# Patient Record
Sex: MALE | Race: WHITE | NOT HISPANIC OR LATINO | Employment: OTHER | URBAN - METROPOLITAN AREA
[De-identification: names, ages, dates, MRNs, and addresses within clinical notes are randomized per-mention and may not be internally consistent; named-entity substitution may affect disease eponyms.]

---

## 2017-05-30 ENCOUNTER — LAB REQUISITION (OUTPATIENT)
Dept: LAB | Facility: HOSPITAL | Age: 57
End: 2017-05-30

## 2017-05-30 DIAGNOSIS — J32.9 CHRONIC SINUSITIS: ICD-10-CM

## 2017-05-30 PROCEDURE — 88305 TISSUE EXAM BY PATHOLOGIST: CPT | Performed by: OTOLARYNGOLOGY

## 2017-05-30 PROCEDURE — 88311 DECALCIFY TISSUE: CPT | Performed by: OTOLARYNGOLOGY

## 2017-06-01 LAB
CYTO UR: NORMAL
LAB AP CASE REPORT: NORMAL
LAB AP CLINICAL INFORMATION: NORMAL
Lab: NORMAL
PATH REPORT.FINAL DX SPEC: NORMAL
PATH REPORT.GROSS SPEC: NORMAL

## 2023-09-10 ENCOUNTER — HOSPITAL ENCOUNTER (INPATIENT)
Facility: HOSPITAL | Age: 63
LOS: 10 days | Discharge: HOME | DRG: 871 | End: 2023-09-20
Attending: EMERGENCY MEDICINE | Admitting: INTERNAL MEDICINE
Payer: MEDICAID

## 2023-09-10 DIAGNOSIS — J44.1 COPD EXACERBATION: ICD-10-CM

## 2023-09-10 DIAGNOSIS — N17.9 AKI (ACUTE KIDNEY INJURY): Primary | ICD-10-CM

## 2023-09-10 DIAGNOSIS — J15.9 COMMUNITY ACQUIRED BACTERIAL PNEUMONIA: ICD-10-CM

## 2023-09-10 DIAGNOSIS — J18.1 LOBAR PNEUMONIA: ICD-10-CM

## 2023-09-10 DIAGNOSIS — R09.02 HYPOXIA: ICD-10-CM

## 2023-09-10 DIAGNOSIS — A41.9 SEVERE SEPSIS: ICD-10-CM

## 2023-09-10 DIAGNOSIS — E87.70 VOLUME OVERLOAD: ICD-10-CM

## 2023-09-10 DIAGNOSIS — R07.9 CHEST PAIN: ICD-10-CM

## 2023-09-10 DIAGNOSIS — R65.20 SEVERE SEPSIS: ICD-10-CM

## 2023-09-10 DIAGNOSIS — A41.9 SEPSIS, DUE TO UNSPECIFIED ORGANISM, UNSPECIFIED WHETHER ACUTE ORGAN DYSFUNCTION PRESENT: ICD-10-CM

## 2023-09-10 PROBLEM — I10 HTN (HYPERTENSION): Chronic | Status: ACTIVE | Noted: 2023-09-10

## 2023-09-10 PROBLEM — E87.6 HYPOKALEMIA: Status: ACTIVE | Noted: 2023-09-10

## 2023-09-10 PROBLEM — E66.01 SEVERE OBESITY (BMI >= 40): Status: ACTIVE | Noted: 2023-09-10

## 2023-09-10 PROBLEM — J96.02 ACUTE RESPIRATORY FAILURE WITH HYPOXIA AND HYPERCARBIA: Status: ACTIVE | Noted: 2023-09-10

## 2023-09-10 PROBLEM — E87.29 HIGH ANION GAP METABOLIC ACIDOSIS: Status: ACTIVE | Noted: 2023-09-10

## 2023-09-10 PROBLEM — J18.9 PNEUMONIA: Status: ACTIVE | Noted: 2023-09-10

## 2023-09-10 PROBLEM — J96.01 ACUTE RESPIRATORY FAILURE WITH HYPOXIA AND HYPERCARBIA: Status: ACTIVE | Noted: 2023-09-10

## 2023-09-10 PROBLEM — I10 HTN (HYPERTENSION): Status: ACTIVE | Noted: 2023-09-10

## 2023-09-10 PROBLEM — R74.01 TRANSAMINITIS: Status: ACTIVE | Noted: 2023-09-10

## 2023-09-10 PROBLEM — E87.1 HYPONATREMIA: Status: ACTIVE | Noted: 2023-09-10

## 2023-09-10 LAB
ALBUMIN SERPL BCP-MCNC: 2 G/DL (ref 3.5–5.2)
ALBUMIN SERPL BCP-MCNC: 2 G/DL (ref 3.5–5.2)
ALLENS TEST: ABNORMAL
ALLENS TEST: ABNORMAL
ALLENS TEST: NORMAL
ALP SERPL-CCNC: 118 U/L (ref 55–135)
ALP SERPL-CCNC: 121 U/L (ref 55–135)
ALT SERPL W/O P-5'-P-CCNC: 86 U/L (ref 10–44)
ALT SERPL W/O P-5'-P-CCNC: 89 U/L (ref 10–44)
AMORPH CRY UR QL COMP ASSIST: ABNORMAL
ANION GAP SERPL CALC-SCNC: 16 MMOL/L (ref 8–16)
ANION GAP SERPL CALC-SCNC: 20 MMOL/L (ref 8–16)
ANISOCYTOSIS BLD QL SMEAR: SLIGHT
AST SERPL-CCNC: 142 U/L (ref 10–40)
AST SERPL-CCNC: 162 U/L (ref 10–40)
B-OH-BUTYR BLD STRIP-SCNC: 0 MMOL/L (ref 0–0.5)
BACTERIA #/AREA URNS AUTO: ABNORMAL /HPF
BASOPHILS # BLD AUTO: 0.05 K/UL (ref 0–0.2)
BASOPHILS NFR BLD: 0.2 % (ref 0–1.9)
BILIRUB SERPL-MCNC: 0.9 MG/DL (ref 0.1–1)
BILIRUB SERPL-MCNC: 1.4 MG/DL (ref 0.1–1)
BILIRUB UR QL STRIP: NEGATIVE
BNP SERPL-MCNC: 11 PG/ML (ref 0–99)
BUN SERPL-MCNC: 66 MG/DL (ref 8–23)
BUN SERPL-MCNC: 68 MG/DL (ref 8–23)
BURR CELLS BLD QL SMEAR: ABNORMAL
CALCIUM SERPL-MCNC: 7.8 MG/DL (ref 8.7–10.5)
CALCIUM SERPL-MCNC: 7.9 MG/DL (ref 8.7–10.5)
CHLORIDE SERPL-SCNC: 92 MMOL/L (ref 95–110)
CHLORIDE SERPL-SCNC: 93 MMOL/L (ref 95–110)
CLARITY UR REFRACT.AUTO: ABNORMAL
CO2 SERPL-SCNC: 16 MMOL/L (ref 23–29)
CO2 SERPL-SCNC: 21 MMOL/L (ref 23–29)
COLOR UR AUTO: ABNORMAL
CREAT SERPL-MCNC: 3.3 MG/DL (ref 0.5–1.4)
CREAT SERPL-MCNC: 3.5 MG/DL (ref 0.5–1.4)
DIFFERENTIAL METHOD: ABNORMAL
DOHLE BOD BLD QL SMEAR: PRESENT
EOSINOPHIL # BLD AUTO: 0 K/UL (ref 0–0.5)
EOSINOPHIL NFR BLD: 0.2 % (ref 0–8)
ERYTHROCYTE [DISTWIDTH] IN BLOOD BY AUTOMATED COUNT: 14 % (ref 11.5–14.5)
EST. GFR  (NO RACE VARIABLE): 18.9 ML/MIN/1.73 M^2
EST. GFR  (NO RACE VARIABLE): 20.3 ML/MIN/1.73 M^2
ESTIMATED AVG GLUCOSE: 111 MG/DL (ref 68–131)
GLUCOSE SERPL-MCNC: 132 MG/DL (ref 70–110)
GLUCOSE SERPL-MCNC: 249 MG/DL (ref 70–110)
GLUCOSE UR QL STRIP: NEGATIVE
HBA1C MFR BLD: 5.5 % (ref 4–5.6)
HCO3 UR-SCNC: 20.5 MMOL/L (ref 24–28)
HCT VFR BLD AUTO: 35 % (ref 40–54)
HCT VFR BLD CALC: 37 %PCV (ref 36–54)
HCV AB SERPL QL IA: NORMAL
HGB BLD-MCNC: 11.7 G/DL (ref 14–18)
HGB UR QL STRIP: ABNORMAL
HIV 1+2 AB+HIV1 P24 AG SERPL QL IA: NORMAL
HYALINE CASTS UR QL AUTO: 0 /LPF
IMM GRANULOCYTES # BLD AUTO: 1.03 K/UL (ref 0–0.04)
IMM GRANULOCYTES NFR BLD AUTO: 4.4 % (ref 0–0.5)
KETONES UR QL STRIP: NEGATIVE
LACTATE SERPL-SCNC: 1.7 MMOL/L (ref 0.5–2.2)
LDH SERPL L TO P-CCNC: 1.85 MMOL/L (ref 0.5–2.2)
LDH SERPL L TO P-CCNC: 5.78 MMOL/L (ref 0.5–2.2)
LEUKOCYTE ESTERASE UR QL STRIP: NEGATIVE
LYMPHOCYTES # BLD AUTO: 0.6 K/UL (ref 1–4.8)
LYMPHOCYTES NFR BLD: 2.7 % (ref 18–48)
MAGNESIUM SERPL-MCNC: 2.7 MG/DL (ref 1.6–2.6)
MCH RBC QN AUTO: 31.3 PG (ref 27–31)
MCHC RBC AUTO-ENTMCNC: 33.4 G/DL (ref 32–36)
MCV RBC AUTO: 94 FL (ref 82–98)
MICROSCOPIC COMMENT: ABNORMAL
MONOCYTES # BLD AUTO: 0.8 K/UL (ref 0.3–1)
MONOCYTES NFR BLD: 3.4 % (ref 4–15)
NEUTROPHILS # BLD AUTO: 21 K/UL (ref 1.8–7.7)
NEUTROPHILS NFR BLD: 89.1 % (ref 38–73)
NITRITE UR QL STRIP: NEGATIVE
NRBC BLD-RTO: 0 /100 WBC
OSMOLALITY SERPL: 288 MOSM/KG (ref 280–300)
OSMOLALITY UR: 285 MOSM/KG (ref 50–1200)
PCO2 BLDA: 49.6 MMHG (ref 35–45)
PH SMN: 7.22 [PH] (ref 7.35–7.45)
PH UR STRIP: 5 [PH] (ref 5–8)
PLATELET # BLD AUTO: 187 K/UL (ref 150–450)
PMV BLD AUTO: 12.2 FL (ref 9.2–12.9)
PO2 BLDA: 61 MMHG (ref 40–60)
POC BE: -7 MMOL/L
POC IONIZED CALCIUM: 0.95 MMOL/L (ref 1.06–1.42)
POC SATURATED O2: 86 % (ref 95–100)
POC TCO2: 22 MMOL/L (ref 24–29)
POCT GLUCOSE: 118 MG/DL (ref 70–110)
POIKILOCYTOSIS BLD QL SMEAR: SLIGHT
POLYCHROMASIA BLD QL SMEAR: ABNORMAL
POTASSIUM SERPL-SCNC: 3.2 MMOL/L (ref 3.5–5.1)
POTASSIUM SERPL-SCNC: 3.2 MMOL/L (ref 3.5–5.1)
PROCALCITONIN SERPL IA-MCNC: 26.88 NG/ML
PROT SERPL-MCNC: 6.4 G/DL (ref 6–8.4)
PROT SERPL-MCNC: 6.5 G/DL (ref 6–8.4)
PROT UR QL STRIP: ABNORMAL
RBC # BLD AUTO: 3.74 M/UL (ref 4.6–6.2)
RBC #/AREA URNS AUTO: 0 /HPF (ref 0–4)
SAMPLE: ABNORMAL
SAMPLE: ABNORMAL
SAMPLE: NORMAL
SARS-COV-2 RDRP RESP QL NAA+PROBE: NEGATIVE
SITE: ABNORMAL
SITE: ABNORMAL
SITE: NORMAL
SODIUM SERPL-SCNC: 128 MMOL/L (ref 136–145)
SODIUM SERPL-SCNC: 130 MMOL/L (ref 136–145)
SODIUM UR-SCNC: 18 MMOL/L (ref 20–250)
SP GR UR STRIP: 1.01 (ref 1–1.03)
SQUAMOUS #/AREA URNS AUTO: 1 /HPF
TOXIC GRANULES BLD QL SMEAR: PRESENT
TROPONIN I SERPL DL<=0.01 NG/ML-MCNC: 0.01 NG/ML (ref 0–0.03)
URN SPEC COLLECT METH UR: ABNORMAL
WBC # BLD AUTO: 23.51 K/UL (ref 3.9–12.7)
WBC #/AREA URNS AUTO: 10 /HPF (ref 0–5)

## 2023-09-10 PROCEDURE — 87205 SMEAR GRAM STAIN: CPT

## 2023-09-10 PROCEDURE — 84145 PROCALCITONIN (PCT): CPT

## 2023-09-10 PROCEDURE — 83605 ASSAY OF LACTIC ACID: CPT

## 2023-09-10 PROCEDURE — 94640 AIRWAY INHALATION TREATMENT: CPT

## 2023-09-10 PROCEDURE — 82803 BLOOD GASES ANY COMBINATION: CPT

## 2023-09-10 PROCEDURE — 96360 HYDRATION IV INFUSION INIT: CPT | Mod: 59

## 2023-09-10 PROCEDURE — 27100171 HC OXYGEN HIGH FLOW UP TO 24 HOURS

## 2023-09-10 PROCEDURE — 87040 BLOOD CULTURE FOR BACTERIA: CPT

## 2023-09-10 PROCEDURE — 99900035 HC TECH TIME PER 15 MIN (STAT)

## 2023-09-10 PROCEDURE — 63600175 PHARM REV CODE 636 W HCPCS

## 2023-09-10 PROCEDURE — 25000003 PHARM REV CODE 250

## 2023-09-10 PROCEDURE — 96366 THER/PROPH/DIAG IV INF ADDON: CPT

## 2023-09-10 PROCEDURE — 94761 N-INVAS EAR/PLS OXIMETRY MLT: CPT

## 2023-09-10 PROCEDURE — 87389 HIV-1 AG W/HIV-1&-2 AB AG IA: CPT | Performed by: EMERGENCY MEDICINE

## 2023-09-10 PROCEDURE — 25000242 PHARM REV CODE 250 ALT 637 W/ HCPCS

## 2023-09-10 PROCEDURE — 81001 URINALYSIS AUTO W/SCOPE: CPT

## 2023-09-10 PROCEDURE — 86803 HEPATITIS C AB TEST: CPT | Performed by: EMERGENCY MEDICINE

## 2023-09-10 PROCEDURE — 82962 GLUCOSE BLOOD TEST: CPT

## 2023-09-10 PROCEDURE — 99223 PR INITIAL HOSPITAL CARE,LEVL III: ICD-10-PCS | Mod: ,,, | Performed by: HOSPITALIST

## 2023-09-10 PROCEDURE — 85025 COMPLETE CBC W/AUTO DIFF WBC: CPT

## 2023-09-10 PROCEDURE — 93010 ELECTROCARDIOGRAM REPORT: CPT | Mod: ,,, | Performed by: INTERNAL MEDICINE

## 2023-09-10 PROCEDURE — 83735 ASSAY OF MAGNESIUM: CPT

## 2023-09-10 PROCEDURE — 94660 CPAP INITIATION&MGMT: CPT

## 2023-09-10 PROCEDURE — 83036 HEMOGLOBIN GLYCOSYLATED A1C: CPT

## 2023-09-10 PROCEDURE — 96367 TX/PROPH/DG ADDL SEQ IV INF: CPT

## 2023-09-10 PROCEDURE — 84300 ASSAY OF URINE SODIUM: CPT

## 2023-09-10 PROCEDURE — 83930 ASSAY OF BLOOD OSMOLALITY: CPT

## 2023-09-10 PROCEDURE — 87081 CULTURE SCREEN ONLY: CPT

## 2023-09-10 PROCEDURE — 87070 CULTURE OTHR SPECIMN AEROBIC: CPT

## 2023-09-10 PROCEDURE — 83880 ASSAY OF NATRIURETIC PEPTIDE: CPT

## 2023-09-10 PROCEDURE — 80053 COMPREHEN METABOLIC PANEL: CPT

## 2023-09-10 PROCEDURE — 27000190 HC CPAP FULL FACE MASK W/VALVE

## 2023-09-10 PROCEDURE — 96365 THER/PROPH/DIAG IV INF INIT: CPT

## 2023-09-10 PROCEDURE — 25000003 PHARM REV CODE 250: Performed by: INTERNAL MEDICINE

## 2023-09-10 PROCEDURE — 99285 EMERGENCY DEPT VISIT HI MDM: CPT | Mod: 25

## 2023-09-10 PROCEDURE — 83935 ASSAY OF URINE OSMOLALITY: CPT

## 2023-09-10 PROCEDURE — 93010 EKG 12-LEAD: ICD-10-PCS | Mod: ,,, | Performed by: INTERNAL MEDICINE

## 2023-09-10 PROCEDURE — 93005 ELECTROCARDIOGRAM TRACING: CPT

## 2023-09-10 PROCEDURE — 82010 KETONE BODYS QUAN: CPT

## 2023-09-10 PROCEDURE — 86713 LEGIONELLA ANTIBODY: CPT

## 2023-09-10 PROCEDURE — 80053 COMPREHEN METABOLIC PANEL: CPT | Mod: 91

## 2023-09-10 PROCEDURE — 20000000 HC ICU ROOM

## 2023-09-10 PROCEDURE — 84484 ASSAY OF TROPONIN QUANT: CPT

## 2023-09-10 PROCEDURE — 99223 1ST HOSP IP/OBS HIGH 75: CPT | Mod: ,,, | Performed by: HOSPITALIST

## 2023-09-10 PROCEDURE — U0002 COVID-19 LAB TEST NON-CDC: HCPCS

## 2023-09-10 RX ORDER — SODIUM CHLORIDE 0.9 % (FLUSH) 0.9 %
10 SYRINGE (ML) INJECTION EVERY 12 HOURS PRN
Status: DISCONTINUED | OUTPATIENT
Start: 2023-09-10 | End: 2023-09-20 | Stop reason: HOSPADM

## 2023-09-10 RX ORDER — NALOXONE HCL 0.4 MG/ML
0.02 VIAL (ML) INJECTION
Status: DISCONTINUED | OUTPATIENT
Start: 2023-09-10 | End: 2023-09-20 | Stop reason: HOSPADM

## 2023-09-10 RX ORDER — LEVALBUTEROL 1.25 MG/.5ML
1.25 SOLUTION, CONCENTRATE RESPIRATORY (INHALATION) EVERY 4 HOURS
Status: DISCONTINUED | OUTPATIENT
Start: 2023-09-10 | End: 2023-09-10

## 2023-09-10 RX ORDER — IPRATROPIUM BROMIDE 0.5 MG/2.5ML
SOLUTION RESPIRATORY (INHALATION)
Status: COMPLETED
Start: 2023-09-10 | End: 2023-09-13

## 2023-09-10 RX ORDER — ONDANSETRON 2 MG/ML
4 INJECTION INTRAMUSCULAR; INTRAVENOUS EVERY 8 HOURS PRN
Status: DISCONTINUED | OUTPATIENT
Start: 2023-09-10 | End: 2023-09-20 | Stop reason: HOSPADM

## 2023-09-10 RX ORDER — IPRATROPIUM BROMIDE AND ALBUTEROL SULFATE 2.5; .5 MG/3ML; MG/3ML
3 SOLUTION RESPIRATORY (INHALATION)
Status: COMPLETED | OUTPATIENT
Start: 2023-09-10 | End: 2023-09-10

## 2023-09-10 RX ORDER — DEXMEDETOMIDINE HYDROCHLORIDE 4 UG/ML
0-1.4 INJECTION, SOLUTION INTRAVENOUS CONTINUOUS
Status: DISCONTINUED | OUTPATIENT
Start: 2023-09-10 | End: 2023-09-10

## 2023-09-10 RX ORDER — IPRATROPIUM BROMIDE 0.5 MG/2.5ML
0.5 SOLUTION RESPIRATORY (INHALATION) EVERY 6 HOURS
Status: DISCONTINUED | OUTPATIENT
Start: 2023-09-10 | End: 2023-09-10

## 2023-09-10 RX ORDER — LEVALBUTEROL 1.25 MG/.5ML
SOLUTION, CONCENTRATE RESPIRATORY (INHALATION)
Status: COMPLETED
Start: 2023-09-10 | End: 2023-09-19

## 2023-09-10 RX ORDER — FLUTICASONE PROPIONATE 50 MCG
1 SPRAY, SUSPENSION (ML) NASAL DAILY PRN
COMMUNITY
Start: 2023-09-02

## 2023-09-10 RX ORDER — TRIAMTERENE AND HYDROCHLOROTHIAZIDE 75; 50 MG/1; MG/1
1 TABLET ORAL EVERY MORNING
COMMUNITY
Start: 2023-08-29

## 2023-09-10 RX ORDER — LEVALBUTEROL 1.25 MG/.5ML
1.25 SOLUTION, CONCENTRATE RESPIRATORY (INHALATION) EVERY 4 HOURS
Status: DISCONTINUED | OUTPATIENT
Start: 2023-09-10 | End: 2023-09-20 | Stop reason: HOSPADM

## 2023-09-10 RX ORDER — NOREPINEPHRINE BITARTRATE/D5W 4MG/250ML
0-.2 PLASTIC BAG, INJECTION (ML) INTRAVENOUS CONTINUOUS
Status: DISCONTINUED | OUTPATIENT
Start: 2023-09-10 | End: 2023-09-10

## 2023-09-10 RX ORDER — IBUPROFEN 200 MG
16 TABLET ORAL
Status: DISCONTINUED | OUTPATIENT
Start: 2023-09-10 | End: 2023-09-20 | Stop reason: HOSPADM

## 2023-09-10 RX ORDER — IBUPROFEN 200 MG
24 TABLET ORAL
Status: DISCONTINUED | OUTPATIENT
Start: 2023-09-10 | End: 2023-09-20 | Stop reason: HOSPADM

## 2023-09-10 RX ORDER — ATORVASTATIN CALCIUM 10 MG/1
10 TABLET, FILM COATED ORAL DAILY
COMMUNITY
Start: 2023-09-09

## 2023-09-10 RX ORDER — AMOXICILLIN 250 MG
1 CAPSULE ORAL 2 TIMES DAILY
Status: DISCONTINUED | OUTPATIENT
Start: 2023-09-10 | End: 2023-09-10

## 2023-09-10 RX ORDER — GLUCAGON 1 MG
1 KIT INJECTION
Status: DISCONTINUED | OUTPATIENT
Start: 2023-09-10 | End: 2023-09-20 | Stop reason: HOSPADM

## 2023-09-10 RX ORDER — ACETAMINOPHEN 500 MG
1000 TABLET ORAL 2 TIMES DAILY PRN
COMMUNITY

## 2023-09-10 RX ORDER — AMLODIPINE BESYLATE 10 MG/1
10 TABLET ORAL DAILY
COMMUNITY
Start: 2023-08-15

## 2023-09-10 RX ORDER — ACETAMINOPHEN 325 MG/1
650 TABLET ORAL EVERY 4 HOURS PRN
Status: DISCONTINUED | OUTPATIENT
Start: 2023-09-10 | End: 2023-09-20 | Stop reason: HOSPADM

## 2023-09-10 RX ORDER — AZITHROMYCIN 250 MG/1
250 TABLET, FILM COATED ORAL DAILY
Status: DISCONTINUED | OUTPATIENT
Start: 2023-09-11 | End: 2023-09-10

## 2023-09-10 RX ORDER — HEPARIN SODIUM 5000 [USP'U]/ML
5000 INJECTION, SOLUTION INTRAVENOUS; SUBCUTANEOUS EVERY 8 HOURS
Status: DISCONTINUED | OUTPATIENT
Start: 2023-09-10 | End: 2023-09-11

## 2023-09-10 RX ORDER — IPRATROPIUM BROMIDE AND ALBUTEROL SULFATE 2.5; .5 MG/3ML; MG/3ML
3 SOLUTION RESPIRATORY (INHALATION) EVERY 4 HOURS PRN
Status: DISCONTINUED | OUTPATIENT
Start: 2023-09-10 | End: 2023-09-20 | Stop reason: HOSPADM

## 2023-09-10 RX ORDER — LOSARTAN POTASSIUM 100 MG/1
100 TABLET ORAL NIGHTLY
COMMUNITY
Start: 2023-08-15

## 2023-09-10 RX ORDER — SODIUM CHLORIDE 0.9 % (FLUSH) 0.9 %
10 SYRINGE (ML) INJECTION
Status: DISCONTINUED | OUTPATIENT
Start: 2023-09-10 | End: 2023-09-20 | Stop reason: HOSPADM

## 2023-09-10 RX ORDER — AZITHROMYCIN 250 MG/1
500 TABLET, FILM COATED ORAL ONCE
Status: DISCONTINUED | OUTPATIENT
Start: 2023-09-10 | End: 2023-09-10

## 2023-09-10 RX ORDER — LOPERAMIDE HYDROCHLORIDE 2 MG/1
2 CAPSULE ORAL 4 TIMES DAILY PRN
Status: DISCONTINUED | OUTPATIENT
Start: 2023-09-10 | End: 2023-09-20 | Stop reason: HOSPADM

## 2023-09-10 RX ORDER — MUPIROCIN 20 MG/G
OINTMENT TOPICAL 2 TIMES DAILY
Status: DISPENSED | OUTPATIENT
Start: 2023-09-10 | End: 2023-09-15

## 2023-09-10 RX ORDER — INSULIN ASPART 100 [IU]/ML
0-5 INJECTION, SOLUTION INTRAVENOUS; SUBCUTANEOUS
Status: DISCONTINUED | OUTPATIENT
Start: 2023-09-10 | End: 2023-09-20 | Stop reason: HOSPADM

## 2023-09-10 RX ORDER — IPRATROPIUM BROMIDE 0.5 MG/2.5ML
0.5 SOLUTION RESPIRATORY (INHALATION) EVERY 4 HOURS
Status: DISCONTINUED | OUTPATIENT
Start: 2023-09-10 | End: 2023-09-20 | Stop reason: HOSPADM

## 2023-09-10 RX ORDER — TESTOSTERONE CYPIONATE 200 MG/ML
0.7 INJECTION, SOLUTION INTRAMUSCULAR WEEKLY
COMMUNITY
Start: 2023-08-24

## 2023-09-10 RX ORDER — LEVALBUTEROL 1.25 MG/.5ML
1.25 SOLUTION, CONCENTRATE RESPIRATORY (INHALATION) EVERY 6 HOURS
Status: DISCONTINUED | OUTPATIENT
Start: 2023-09-10 | End: 2023-09-10

## 2023-09-10 RX ADMIN — IPRATROPIUM BROMIDE 0.5 MG: 0.5 SOLUTION RESPIRATORY (INHALATION) at 01:09

## 2023-09-10 RX ADMIN — IPRATROPIUM BROMIDE 0.5 MG: 0.5 SOLUTION RESPIRATORY (INHALATION) at 04:09

## 2023-09-10 RX ADMIN — HEPARIN SODIUM 5000 UNITS: 5000 INJECTION INTRAVENOUS; SUBCUTANEOUS at 09:09

## 2023-09-10 RX ADMIN — AZITHROMYCIN MONOHYDRATE 500 MG: 500 INJECTION, POWDER, LYOPHILIZED, FOR SOLUTION INTRAVENOUS at 02:09

## 2023-09-10 RX ADMIN — CEFEPIME 1 G: 1 INJECTION, POWDER, FOR SOLUTION INTRAMUSCULAR; INTRAVENOUS at 09:09

## 2023-09-10 RX ADMIN — SODIUM CHLORIDE, POTASSIUM CHLORIDE, SODIUM LACTATE AND CALCIUM CHLORIDE 1121 ML: 600; 310; 30; 20 INJECTION, SOLUTION INTRAVENOUS at 09:09

## 2023-09-10 RX ADMIN — POTASSIUM BICARBONATE 40 MEQ: 391 TABLET, EFFERVESCENT ORAL at 09:09

## 2023-09-10 RX ADMIN — IPRATROPIUM BROMIDE AND ALBUTEROL SULFATE 3 ML: .5; 3 SOLUTION RESPIRATORY (INHALATION) at 09:09

## 2023-09-10 RX ADMIN — HEPARIN SODIUM 5000 UNITS: 5000 INJECTION INTRAVENOUS; SUBCUTANEOUS at 02:09

## 2023-09-10 RX ADMIN — LEVALBUTEROL 1.25 MG: 1.25 SOLUTION, CONCENTRATE RESPIRATORY (INHALATION) at 01:09

## 2023-09-10 RX ADMIN — POTASSIUM BICARBONATE 40 MEQ: 391 TABLET, EFFERVESCENT ORAL at 02:09

## 2023-09-10 RX ADMIN — LEVALBUTEROL 1.25 MG: 1.25 SOLUTION, CONCENTRATE RESPIRATORY (INHALATION) at 08:09

## 2023-09-10 RX ADMIN — LEVALBUTEROL 1.25 MG: 1.25 SOLUTION, CONCENTRATE RESPIRATORY (INHALATION) at 04:09

## 2023-09-10 RX ADMIN — VANCOMYCIN HYDROCHLORIDE 2500 MG: 1 INJECTION, POWDER, LYOPHILIZED, FOR SOLUTION INTRAVENOUS at 09:09

## 2023-09-10 RX ADMIN — IPRATROPIUM BROMIDE 0.5 MG: 0.5 SOLUTION RESPIRATORY (INHALATION) at 08:09

## 2023-09-10 RX ADMIN — MUPIROCIN: 20 OINTMENT TOPICAL at 09:09

## 2023-09-10 NOTE — ASSESSMENT & PLAN NOTE
Patient has hyponatremia which is uncontrolled,We will aim to correct the sodium by 4-6mEq in 24 hours. We will monitor sodium Daily. The hyponatremia is due to Dehydration/hypovolemia. We will obtain the following studies: Urine sodium, urine osmolality, serum osmolality. We will treat the hyponatremia with IV fluids. The patient's sodium results have been reviewed and are listed below.  Recent Labs   Lab 09/10/23  0905   *

## 2023-09-10 NOTE — ASSESSMENT & PLAN NOTE
This patient does have evidence of infective focus  My overall impression is sepsis.  Source: Respiratory  Antibiotics given-   Antibiotics (72h ago, onward)    Start     Stop Route Frequency Ordered    09/11/23 0900  ceFEPIme (MAXIPIME) 1 g in dextrose 5 % in water (D5W) 100 mL IVPB (MB+)         -- IV Daily 09/10/23 1335    09/10/23 1426  vancomycin - pharmacy to dose  (vancomycin IVPB (PEDS and ADULTS))        See Hyperspace for full Linked Orders Report.    -- IV pharmacy to manage frequency 09/10/23 1326    09/10/23 1346  azithromycin (ZITHROMAX) 500 mg in dextrose 5 % (D5W) 250 mL IVPB (Vial-Mate)         -- IV Every 24 hours (non-standard times) 09/10/23 1346        Latest lactate reviewed-  Recent Labs   Lab 09/10/23  1440   LACTATE 1.7     Organ dysfunction indicated by Acute liver injury, Acute respiratory failure and transaminitis     Fluid challenge Actual Body weight- Patient will receive 30ml/kg actual body weight to calculate fluid bolus for treatment of septic shock.     Will Not start Pressors- Levophed for MAP of 65    Afebrile and satting on the 90s with RR in 30s on arrival  -WBC 23, RR >22, lactic 5.88 improved to 1.85 with fluids, procal 26.88  -Started on Vanc, Zosyn, Azithromycin   -needed BIPAP and weaned to comfort flow

## 2023-09-10 NOTE — ASSESSMENT & PLAN NOTE
63-year-old man presenting with severe sepsis likely secondary to pneumonia. Elevations likely due to infection/metabolic acidosis.     · Admission AST//89  · Trend CMP

## 2023-09-10 NOTE — PHARMACY MED REC
"Admission Medication History     The home medication history was taken by Asmita Beyer.    You may go to "Admission" then "Reconcile Home Medications" tabs to review and/or act upon these items.     The home medication list has been updated by the Pharmacy department.   Please read ALL comments highlighted in yellow.   Please address this information as you see fit.    Feel free to contact us if you have any questions or require assistance.        Medications listed below were obtained from: Patient/family and Analytic software- [employer]  Current Outpatient Medications on File Prior to Encounter   Medication Sig    acetaminophen (TYLENOL) 500 MG tablet Take 1,000 mg by mouth 2 (two) times daily as needed for Pain.    amLODIPine (NORVASC) 10 MG tablet Take 10 mg by mouth once daily.    atorvastatin (LIPITOR) 10 MG tablet Take 10 mg by mouth once daily.    fluticasone propionate (FLONASE) 50 mcg/actuation nasal spray 1 spray by Each Nostril route daily as needed for Allergies.    losartan (COZAAR) 100 MG tablet Take 100 mg by mouth every evening.    testosterone cypionate (DEPOTESTOTERONE CYPIONATE) 200 mg/mL injection Inject 0.7 mLs into the muscle once a week.    triamterene-hydrochlorothiazide 75-50 mg (MAXZIDE) 75-50 mg per tablet Take 1 tablet by mouth every morning.               Asmita Beyer  EXT [id number]                  .          "

## 2023-09-10 NOTE — ASSESSMENT & PLAN NOTE
· Holding home amlodipine, losartan, and triamterene/HCTZ for acute infection  · Resume as clinically indicated

## 2023-09-10 NOTE — SUBJECTIVE & OBJECTIVE
Past Medical History:   Diagnosis Date    Hypertension        History reviewed. No pertinent surgical history.    Review of patient's allergies indicates:  No Known Allergies    No current facility-administered medications on file prior to encounter.     Current Outpatient Medications on File Prior to Encounter   Medication Sig    acetaminophen (TYLENOL) 500 MG tablet Take 1,000 mg by mouth 2 (two) times daily as needed for Pain.    amLODIPine (NORVASC) 10 MG tablet Take 10 mg by mouth once daily.    atorvastatin (LIPITOR) 10 MG tablet Take 10 mg by mouth once daily.    fluticasone propionate (FLONASE) 50 mcg/actuation nasal spray 1 spray by Each Nostril route daily as needed for Allergies.    losartan (COZAAR) 100 MG tablet Take 100 mg by mouth every evening.    testosterone cypionate (DEPOTESTOTERONE CYPIONATE) 200 mg/mL injection Inject 0.7 mLs into the muscle once a week.    triamterene-hydrochlorothiazide 75-50 mg (MAXZIDE) 75-50 mg per tablet Take 1 tablet by mouth every morning.     Family History    None       Tobacco Use    Smoking status: Never    Smokeless tobacco: Never   Substance and Sexual Activity    Alcohol use: Never    Drug use: Never    Sexual activity: Not on file     Review of Systems   Constitutional:  Positive for chills, fatigue and fever.   HENT: Negative.     Respiratory:  Positive for cough, shortness of breath and wheezing.    Cardiovascular:  Negative for chest pain and leg swelling.   Gastrointestinal:  Positive for diarrhea. Negative for abdominal distention, abdominal pain, nausea and vomiting.   Genitourinary: Negative.    Neurological: Negative.      Objective:     Vital Signs (Most Recent):  Temp: 98.5 °F (36.9 °C) (09/10/23 1133)  Pulse: 96 (09/10/23 1359)  Resp: (!) 28 (09/10/23 1359)  BP: (!) 110/55 (09/10/23 1359)  SpO2: 96 % (09/10/23 1359) Vital Signs (24h Range):  Temp:  [98.5 °F (36.9 °C)] 98.5 °F (36.9 °C)  Pulse:  [] 96  Resp:  [17-34] 28  SpO2:  [90 %-98 %] 96  %  BP: ()/(52-61) 110/55     Weight: 131.5 kg (290 lb)  Body mass index is 42.83 kg/m².     Physical Exam  Constitutional:       Appearance: He is obese. He is ill-appearing.   HENT:      Mouth/Throat:      Mouth: Mucous membranes are moist.      Pharynx: Oropharynx is clear.   Eyes:      Extraocular Movements: Extraocular movements intact.   Cardiovascular:      Rate and Rhythm: Normal rate and regular rhythm.      Pulses: Normal pulses.      Heart sounds: No murmur heard.     No gallop.   Pulmonary:      Effort: Respiratory distress present.      Breath sounds: Wheezing present. No rales.   Abdominal:      General: There is distension.      Tenderness: There is no abdominal tenderness. There is no guarding.   Skin:     General: Skin is warm.      Capillary Refill: Capillary refill takes less than 2 seconds.      Coloration: Skin is not jaundiced.      Findings: No bruising.   Neurological:      General: No focal deficit present.      Mental Status: He is oriented to person, place, and time.   Psychiatric:         Mood and Affect: Mood normal.                Significant Labs: All pertinent labs within the past 24 hours have been reviewed.    Significant Imaging: I have reviewed all pertinent imaging results/findings within the past 24 hours.  I have reviewed and interpreted all pertinent imaging results/findings within the past 24 hours.

## 2023-09-10 NOTE — ASSESSMENT & PLAN NOTE
"This patient does have evidence of infective focus  My overall impression is sepsis.  Source: Respiratory  Antibiotics given-   Antibiotics (72h ago, onward)    Start     Stop Route Frequency Ordered    09/11/23 0900  ceFEPIme (MAXIPIME) 1 g in dextrose 5 % in water (D5W) 100 mL IVPB (MB+)         -- IV Daily 09/10/23 1335    09/10/23 1426  vancomycin - pharmacy to dose  (vancomycin IVPB (PEDS and ADULTS))        See Hyperspace for full Linked Orders Report.    -- IV pharmacy to manage frequency 09/10/23 1326    09/10/23 1346  azithromycin (ZITHROMAX) 500 mg in dextrose 5 % (D5W) 250 mL IVPB (Vial-Mate)         -- IV Every 24 hours (non-standard times) 09/10/23 1346        Latest lactate reviewed-  No results for input(s): "LACTATE" in the last 72 hours.  Organ dysfunction indicated by Acute liver injury, Acute respiratory failure and transaminitis     Fluid challenge Actual Body weight- Patient will receive 30ml/kg actual body weight to calculate fluid bolus for treatment of septic shock.     Will Not start Pressors- Levophed for MAP of 65    Afebrile and satting on the 90s with RR in 30s on arrival  -WBC 23, RR >22, lactic 5.88 improved to 1.85 with fluids, procal 26.88  -Started on Vanc, Zosyn, Azithromycin   -needed BIPAP and weaned to comfort flow  "

## 2023-09-10 NOTE — ASSESSMENT & PLAN NOTE
Na 128, Cl 92, bicarb 16, anion 20  Lactic 5.78 on VBG and improved with POC 1.85  2/2 to PNA, dehydration, diarrhea

## 2023-09-10 NOTE — CONSULTS
Consult received, patient to be admitted to MICU. Full H&P to follow.    Deborah Douglass MD  Pulmonary/Critical Care Fellow  09/10/2023 4:23 PM  Spectra: [phone]

## 2023-09-10 NOTE — ASSESSMENT & PLAN NOTE
"This patient does have evidence of infective focus  My overall impression is sepsis.  Source: Respiratory  Antibiotics given-   Antibiotics (72h ago, onward)    Start     Stop Route Frequency Ordered    09/11/23 0900  ceFEPIme (MAXIPIME) 1 g in dextrose 5 % in water (D5W) 100 mL IVPB (MB+)         -- IV Daily 09/10/23 1335    09/10/23 1426  vancomycin - pharmacy to dose  (vancomycin IVPB (PEDS and ADULTS))        See [facility]ata for full Linked Orders Report.    -- IV pharmacy to manage frequency 09/10/23 1326    09/10/23 1346  azithromycin (ZITHROMAX) 500 mg in dextrose 5 % (D5W) 250 mL IVPB (Vial-Mate)         -- IV Every 24 hours (non-standard times) 09/10/23 1346        Latest lactate reviewed-  Recent Labs   Lab 09/10/23  1440   LACTATE 1.7     Organ dysfunction indicated by Acute kidney injury and Acute liver injury    Fluid challenge Actual Body weight- Patient will receive 30ml/kg actual body weight to calculate fluid bolus for treatment of septic shock.     Will Not start Pressors- Levophed for MAP of 65  Source control achieved by: antibiotics    · Leukocytosis 23, lactic 5.88 on presentation- 1.85 on repeat, procal 26.88, DANNI, transaminitis  · For treatment of source see "pneumonia"    "

## 2023-09-10 NOTE — PROGRESS NOTES
"Pharmacokinetic Initial Assessment: IV Vancomycin    Assessment/Plan:    Initiate intravenous vancomycin with loading dose of 2500 mg once with subsequent doses when random concentrations are less than 20 mcg/mL  Desired empiric serum trough concentration is 15 to 20 mcg/mL  Draw vancomycin random level on 9/11 at 0500.    Pharmacy will continue to follow and monitor vancomycin.      Please contact pharmacy at extension [phone] with any questions regarding this assessment.     Thank you for the consult,   Shahrzad Peace       Patient brief summary:  Umair Kenney is a 63 y.o. male initiated on antimicrobial therapy with IV Vancomycin for treatment of suspected sepsis    Drug Allergies:   Review of patient's allergies indicates:  No Known Allergies    Actual Body Weight:   132 kg    Renal Function:   Estimated Creatinine Clearance: 29.4 mL/min (A) (based on SCr of 3.5 mg/dL (H)).,     Dialysis Method (if applicable):  N/A    CBC (last 72 hours):  Recent Labs   Lab Result Units 09/10/23  0905 09/10/23  1122   WBC K/uL 23.51*  --    Hemoglobin g/dL 11.7*  --    Hemoglobin A1C %  --  5.5   Hematocrit % 35.0*  --    Platelets K/uL 187  --    Gran % % 89.1*  --    Lymph % % 2.7*  --    Mono % % 3.4*  --    Eosinophil % % 0.2  --    Basophil % % 0.2  --    Differential Method  Automated  --        Metabolic Panel (last 72 hours):  Recent Labs   Lab Result Units 09/10/23  0905 09/10/23  0912   Sodium mmol/L 128*  --    Potassium mmol/L 3.2*  --    Chloride mmol/L 92*  --    CO2 mmol/L 16*  --    Glucose mg/dL 249*  --    Glucose, UA   --  Negative   BUN mg/dL 66*  --    Creatinine mg/dL 3.5*  --    Albumin g/dL 2.0*  --    Total Bilirubin mg/dL 1.4*  --    Alkaline Phosphatase U/L 121  --    AST U/L 162*  --    ALT U/L 89*  --    Magnesium mg/dL 2.7*  --        Drug levels (last 3 results):  No results for input(s): "VANCOMYCINRA", "VANCORANDOM", "VANCOMYCINPE", "VANCOPEAK", "VANCOMYCINTR", "VANCOTROUGH" in the last 72 " hours.    Microbiologic Results:  Microbiology Results (last 7 days)       Procedure Component Value Units Date/Time    Blood culture x two cultures. Draw prior to antibiotics. [[id number]] Collected: 09/10/23 0908    Order Status: Sent Specimen: Blood from Peripheral, Forearm, Right Updated: 09/10/23 0927    Blood culture x two cultures. Draw prior to antibiotics. [[id number]] Collected: 09/10/23 0904    Order Status: Sent Specimen: Blood from Peripheral, Forearm, Left Updated: 09/10/23 0927    Culture, Respiratory with Gram Stain [[id number]]     Order Status: No result Specimen: Respiratory from Sputum

## 2023-09-10 NOTE — HPI
63-year-old male with medical history of hypertension that presents to the ED directly from returning from cruise to [place] for 1 week. Prior to leaving [place] on the cruise, he starting to feel sick. He reports having fever, chills, and a productive cough on the first day of the cruise after being out in the rain. The cruise doctor treated him with Z pack/cough suppressant. Throughout the trip, his symptoms progressively worsened and began having diarrhea (10 times daily), frequent urination and shortness of breath. He was seen again by the doctor this morning and found to have PNA on CXR. Denies any sick contacts or anyone else on cruise getting sick. He is from [place], [place]. He was hospitalized for 10 days with COVID-19 early on in the pandemic with no prior history of intubation. He denies any history of smoking. He reports not drinking alcohol for more than three years.     Patient's BP was in the 50s/40s on arrival per EMS. He was given a 1L of fluid and satting in the low 90s on 5L NC. Due to his acidemia and RR in the 30s on arrival, he was put on BIPAP and weaned to comfort flow satting 95-96%.      Labs notable for WBC 23, Na 128, K 3.2, bicarb 16, anion gap 20, Cr 3.5, AST//89, Procal 26, VBG - ph 7.2, CO2 49, HCO3 20.5, lactic 5.78, point of care lactic 1.85. He was started on IV vanc/cefepime/azithromycin and admitted to MICU.

## 2023-09-10 NOTE — H&P
[facility] - Emergency Dept  Critical Care Medicine  History & Physical    Patient Name: Umair Kenney  MRN: [id number]  Admission Date: 9/10/2023  Hospital Length of Stay: 0 days  Code Status: Full Code  Attending Physician: Savannah Underwood MD   Primary Care Provider: No primary care provider on file.   Principal Problem: Severe sepsis    Subjective:     HPI:  63-year-old male with medical history of hypertension that presents to the ED directly from returning from cruise to [place] for 1 week. Prior to leaving [place] on the cruise, he starting to feel sick. He reports having fever, chills, and a productive cough on the first day of the cruise after being out in the rain. The cruise doctor treated him with Z pack/cough suppressant. Throughout the trip, his symptoms progressively worsened and began having diarrhea (10 times daily), frequent urination and shortness of breath. He was seen again by the doctor this morning and found to have PNA on CXR. Denies any sick contacts or anyone else on cruise getting sick. He is from [place], [place]. He was hospitalized for 10 days with COVID-19 early on in the pandemic with no prior history of intubation. He denies any history of smoking. He reports not drinking alcohol for more than three years.     Patient's BP was in the 50s/40s on arrival per EMS. He was given a 1L of fluid and satting in the low 90s on 5L NC. Due to his acidemia and RR in the 30s on arrival, he was put on BIPAP and weaned to comfort flow satting 95-96%.      Labs notable for WBC 23, Na 128, K 3.2, bicarb 16, anion gap 20, Cr 3.5, AST//89, Procal 26, VBG - ph 7.2, CO2 49, HCO3 20.5, lactic 5.78, point of care lactic 1.85. He was started on IV vanc/cefepime/azithromycin and admitted to MICU.       Hospital/ICU Course:  No notes on file     Review of Systems   Constitutional:  Positive for fatigue. Negative for chills and fever.   HENT:  Negative for congestion, rhinorrhea and sore  throat.    Eyes:  Negative for photophobia and visual disturbance.   Respiratory:  Positive for cough, chest tightness and shortness of breath.    Cardiovascular:  Negative for chest pain, palpitations and leg swelling.   Gastrointestinal:  Positive for diarrhea. Negative for abdominal pain, blood in stool, nausea and vomiting.   Genitourinary:  Negative for dysuria, flank pain and hematuria.   Musculoskeletal:  Negative for back pain and neck pain.   Skin:  Negative for wound.   Neurological:  Negative for dizziness, seizures, syncope, weakness, numbness and headaches.     Objective:     Vital Signs (Most Recent):  Temp: 98.8 °F (37.1 °C) (09/10/23 1533)  Pulse: 97 (09/10/23 1533)  Resp: (!) 23 (09/10/23 1533)  BP: (!) 107/55 (09/10/23 1533)  SpO2: 95 % (09/10/23 1533) Vital Signs (24h Range):  Temp:  [98.5 °F (36.9 °C)-98.8 °F (37.1 °C)] 98.8 °F (37.1 °C)  Pulse:  [] 97  Resp:  [17-34] 23  SpO2:  [90 %-98 %] 95 %  BP: ()/(52-61) 107/55   Weight: 131.5 kg (290 lb)  Body mass index is 42.83 kg/m².      Intake/Output Summary (Last 24 hours) at 9/10/2023 1624  Last data filed at 9/10/2023 1538  Gross per 24 hour   Intake 1465.42 ml   Output --   Net 1465.42 ml          Physical Exam  Vitals and nursing note reviewed.   Constitutional:       Appearance: He is not diaphoretic.   HENT:      Head: Normocephalic and atraumatic.      Right Ear: External ear normal.      Left Ear: External ear normal.      Nose: Nose normal.      Comments: HF NC in place      Mouth/Throat:      Mouth: Mucous membranes are moist.   Eyes:      Extraocular Movements: Extraocular movements intact.      Conjunctiva/sclera: Conjunctivae normal.   Cardiovascular:      Rate and Rhythm: Normal rate and regular rhythm.      Pulses: Normal pulses.           Radial pulses are 2+ on the right side and 2+ on the left side.        Dorsalis pedis pulses are 2+ on the right side and 2+ on the left side.      Heart sounds: Normal heart sounds. No  murmur heard.     No friction rub. No gallop.   Pulmonary:      Effort: No respiratory distress.      Breath sounds: Wheezing present.      Comments: Actively coughing throughout exam   Tachypnea, speaking in short sentences  Diffuse expiratory wheezing bilaterally  Crackles auscultated lower lung fields   Abdominal:      General: There is no distension.      Palpations: Abdomen is soft.      Tenderness: There is no abdominal tenderness. There is no guarding or rebound.   Musculoskeletal:         General: Normal range of motion.      Cervical back: Normal range of motion and neck supple.      Right lower leg: No edema.      Left lower leg: No edema.   Skin:     General: Skin is warm and dry.   Neurological:      Mental Status: He is alert.            Vents:  Oxygen Concentration (%): 67 (09/10/23 1359)  Lines/Drains/Airways       Peripheral Intravenous Line  Duration                  Peripheral IV - Single Lumen 09/10/23 0901 20 G Anterior;Left Forearm <1 day         Peripheral IV - Single Lumen 09/10/23 0903 20 G Posterior;Right Hand <1 day         Peripheral IV - Single Lumen 09/10/23 0904 20 G Left;Posterior Hand <1 day                  Significant Labs:    CBC/Anemia Profile:  Recent Labs   Lab 09/10/23  0901 09/10/23  0905   WBC  --  23.51*   HGB  --  11.7*   HCT 37 35.0*   PLT  --  187   MCV  --  94   RDW  --  14.0        Chemistries:  Recent Labs   Lab 09/10/23  0905   *   K 3.2*   CL 92*   CO2 16*   BUN 66*   CREATININE 3.5*   CALCIUM 7.8*   ALBUMIN 2.0*   PROT 6.4   BILITOT 1.4*   ALKPHOS 121   ALT 89*   *   MG 2.7*       All pertinent labs within the past 24 hours have been reviewed.    Significant Imaging:    XR CHEST AP PORTABLE     CLINICAL HISTORY:   Sepsis;     TECHNIQUE:   Single frontal view of the chest was performed.     COMPARISON:   None     FINDINGS:   Cardiac monitoring leads are noted.  Overlying soft tissues of the head and neck limit evaluation of the lung apices.  "    Borderline enlarged cardiac silhouette.     Patchy opacities are noted throughout the majority of the right lung and to lesser extent the left lung base.     Small pleural effusions not excluded.     No definite pneumothorax.     No definite acute findings in the visualized portions of the abdomen.  Osseous and soft tissue structures without definite acute abnormality.  Postop sequela of the left scapula noted.    Impression:       Patchy opacities involving the right greater left lung, concerning for infectious etiology.  Imaging follow-up to resolution in 4-6 weeks would be recommended.        I have reviewed all pertinent imaging results/findings within the past 24 hours.    Assessment/Plan:     Pulmonary  Pneumonia  63-year-old man presenting directly from cruise ship with pneumonia treated with a Z-monique. He complains of shortness of breath, nausea, diarrhea, coughing, and fatigue.     CXR: "Patchy opacities involving the right greater left lung, concerning for infectious etiology.  Imaging follow-up to resolution in 4-6 weeks would be recommended."     · Vancomycin, cefepime, and azithromycin  · Procal 26.88  · Legionella testing  · Sputum culture  · BiPAP with goals to high flow nasal cannula           Acute respiratory failure with hypoxia and hypercarbia  Patient with Hypercapnic and Hypoxic Respiratory failure which is Acute.  he is not on home oxygen. Supplemental oxygen was provided and noted- Oxygen Concentration (%):  [67-80] 67    .   Signs/symptoms of respiratory failure include- tachypnea, increased work of breathing, respiratory distress and wheezing. Contributing diagnoses includes - Pneumonia Labs and images were reviewed. Patient Has not had a recent ABG. Recent VBG reviewed.    · Patient was weaned from BiPAP to high flow nasal cannula in ED, but he had increased work of breathing and was placed back on BiPAP.   · See "pneumonia" for treatment     Cardiac/Vascular  HTN " (hypertension)  · Holding home amlodipine, losartan, and triamterene/HCTZ for acute infection  · Resume as clinically indicated    Renal/  High anion gap metabolic acidosis  Labs on admission: , Cl 92, Bicard 16. Anion gap = 20. Likely secondary to dehydration and diarrhea.     · IVF fluids and improved oral intake     Hypokalemia  · Replete as needed    Hyponatremia  Patient presents with Na 128. . Predisposing medications: HCTZ    Diagnostics: Urine Na 18, Urine Osm 285, Serum Osm 288.    DDx: Likely secondary to Hypovolemia/Dehydration    · Trend Na; goal correction < 6-8 units / 24 hours  · TSH        DANNI (acute kidney injury)  Patient presenting with DANNI with admission sCr 3.5 (unknown baseline)    Serum creatinine: 3.5 mg/dL (H) 09/10/23 0905  Estimated creatinine clearance: 29.4 mL/min (A)    DDx: Likely due to pre-renal azotemia due to dehydration.    · Trend sCr  · Trend RFP; replete electrolytes PRN for goal K > 4, Phos > 3, Mg > 2  · Strict I&Os  · Avoid nephrotoxic agents  · Renally adjust medications      ID  * Severe sepsis  This patient does have evidence of infective focus  My overall impression is sepsis.  Source: Respiratory  Antibiotics given-   Antibiotics (72h ago, onward)    Start     Stop Route Frequency Ordered    09/11/23 0900  ceFEPIme (MAXIPIME) 1 g in dextrose 5 % in water (D5W) 100 mL IVPB (MB+)         -- IV Daily 09/10/23 1335    09/10/23 1426  vancomycin - pharmacy to dose  (vancomycin IVPB (PEDS and ADULTS))        See Hyperspace for full Linked Orders Report.    -- IV pharmacy to manage frequency 09/10/23 1326    09/10/23 1346  azithromycin (ZITHROMAX) 500 mg in dextrose 5 % (D5W) 250 mL IVPB (Vial-Mate)         -- IV Every 24 hours (non-standard times) 09/10/23 1346        Latest lactate reviewed-  Recent Labs   Lab 09/10/23  1440   LACTATE 1.7     Organ dysfunction indicated by Acute kidney injury and Acute liver injury    Fluid challenge Actual Body weight- Patient will  "receive 30ml/kg actual body weight to calculate fluid bolus for treatment of septic shock.     Will Not start Pressors- Levophed for MAP of 65  Source control achieved by: antibiotics    · Leukocytosis 23, lactic 5.88 on presentation- 1.85 on repeat, procal 26.88, DANNI, transaminitis  · For treatment of source see "pneumonia"      Endocrine  Severe obesity (BMI >= 40)  Body mass index is 42.83 kg/m². Morbid obesity complicates all aspects of disease management from diagnostic modalities to treatment. Weight loss encouraged and health benefits explained to patient.    GI  Transaminitis  63-year-old man presenting with severe sepsis likely secondary to pneumonia. Elevations likely due to infection/metabolic acidosis.     · Admission AST//89  · Trend [facility]          Critical Care Daily Checklist:    A: Awake: RASS Goal/Actual Goal:    Actual:     B: Spontaneous Breathing Trial Performed?     C: SAT & SBT Coordinated?  N/A                      D: Delirium: CAM-ICU     E: Early Mobility Performed? N/A   F: Feeding Goal:    Status:     Current Diet Order   Procedures    Diet NPO      AS: Analgesia/Sedation N/A   T: Thromboembolic Prophylaxis heparin   H: HOB > 300 Yes   U: Stress Ulcer Prophylaxis (if needed) N/A   G: Glucose Control  < 180   B: Bowel Function     I: Indwelling Catheter ([employer]) Necessity PIV   D: De-escalation of Antimicrobials/Pharmacotherapies Admission, NA    Plan for the day/ETD Admission, wean BiPAP, PNA workup    Code Status:  Family/Goals of Care: Full Code         Critical secondary to Patient has a condition that poses threat to life and bodily function: Severe Respiratory Distress and Sepsis 2/2 Pneumonia     Critical care was time spent personally by me on the following activities: development of treatment plan with patient or surrogate and bedside caregivers, discussions with consultants, evaluation of patient's response to treatment, examination of patient, ordering and performing " treatments and interventions, ordering and review of laboratory studies, ordering and review of radiographic studies, pulse oximetry, re-evaluation of patient's condition. This critical care time did not overlap with that of any other provider or involve time for any procedures.     Kenji Hall MD  Critical Care Medicine  [facility] - [facility] Dept

## 2023-09-10 NOTE — ASSESSMENT & PLAN NOTE
"Patient with Hypercapnic and Hypoxic Respiratory failure which is Acute.  he is not on home oxygen. Supplemental oxygen was provided and noted- Oxygen Concentration (%):  [67-80] 67    .   Signs/symptoms of respiratory failure include- tachypnea, increased work of breathing, respiratory distress and wheezing. Contributing diagnoses includes - Pneumonia Labs and images were reviewed. Patient Has not had a recent ABG. Recent VBG reviewed.    · Patient was weaned from BiPAP to high flow nasal cannula in ED, but he had increased work of breathing and was placed back on BiPAP.   · See "pneumonia" for treatment   "

## 2023-09-10 NOTE — ASSESSMENT & PLAN NOTE
Labs on admission: , Cl 92, Bicard 16. Anion gap = 20. Likely secondary to dehydration and diarrhea.     · IVF fluids and improved oral intake

## 2023-09-10 NOTE — ASSESSMENT & PLAN NOTE
Body mass index is 42.83 kg/m². Morbid obesity complicates all aspects of disease management from diagnostic modalities to treatment. Weight loss encouraged and health benefits explained to patient.

## 2023-09-10 NOTE — ASSESSMENT & PLAN NOTE
Patient presenting with DANNI with admission sCr 3.5 (unknown baseline)    Serum creatinine: 3.5 mg/dL (H) 09/10/23 0905  Estimated creatinine clearance: 29.4 mL/min (A)    DDx: Likely due to pre-renal azotemia due to dehydration.    · Trend sCr  · Trend RFP; replete electrolytes PRN for goal K > 4, Phos > 3, Mg > 2  · Strict I&Os  · Avoid nephrotoxic agents  · Renally adjust medications

## 2023-09-10 NOTE — ASSESSMENT & PLAN NOTE
Patient with acute kidney injury/acute renal failure likely due to pre-renal azotemia due to dehydration DANNI is currently stable. Baseline creatinine unknown - Labs reviewed- Renal function/electrolytes with Estimated Creatinine Clearance: 29.4 mL/min (A) (based on SCr of 3.5 mg/dL (H)). according to latest data. Monitor urine output and serial BMP and adjust therapy as needed. Avoid nephrotoxins and renally dose meds for GFR listed above.    -unknown baseline, likely 2/2 dehydration  -1L fluids in ED  -U/A clean, did not reflex  -denies dysuria, frequency, urgency

## 2023-09-10 NOTE — Clinical Note
Diagnosis: Hypoxia [868975]   Admitting Provider:: RYAN DONATO [[id number]]   Future Attending Provider: RYAN DONATO [[id number]]   Reason for IP Medical Treatment  (Clinical interventions that can only be accomplished in the IP setting? ) :: IV antibiotics; o2 therapy   I certify that Inpatient services for greater than or equal to 2 midnights are medically necessary:: No   Plans for Post-Acute care--if anticipated (pick the single best option):: A. No post acute care anticipated at this time   Special Needs:: No Special Needs [1]

## 2023-09-10 NOTE — MED STUDENT ASSESSMENT & PLAN
62 yo male with PMHx of HTN presents with pneumonia, hypoxia, respiratory distress.     Pt boarded a cruise ship on 9/3 (1 week ago) with destinations to [place], [place], and [place]. He is originally from [place], [place]. He denied symptoms prior to boarding ship, but on Monday started to experience SOB, fevers, chills, productive cough with blood. The ship doctor diagnosed him with bronchitis and was prescribed a Z-pack and cough suppressant for which he has taken every day since. Throughout the trip, symptoms started to worsen with watery and bloody diarrhea 10x a day. Before leaving the ship today, he saw the ship doctor again, and got a CXR which showed pneumonia.     He complains of associated symptoms of year long SOB that is worse with walking, orthopnea, and apnea at night.     Patient arrived to the ED via EMS saturating at low 90s and on 15L of non-rebreather mask. BP was 40s/50s, , RR of 30s and was started on IV vanc and given 1L of fluid. He was placed on BiPAP but able to be weened to 35L high flow nasal cannula and saturated at 95-96%.     ED Labs   WBC 23  Na+ 128   K+ 3.2  Bicarb 16   Anion Gap 20   BUN 66   Cr 3.5     ALT 89  Procalcitonin 26    Venous Blood Gas   pH 7.2   CO2 49   Bicarb 20.5     Lactate 5.78 improved to 1.85     Urinalysis   10 WBCs and 3+ occult blood       ED Imaging  CXR performed at ED showed patchy opacities in right and left lung, but greater on right.     Physical Exam   - On appearance, he looks ill  - Pulmonary Exam - Increased respiratory effort, wheezing, and crackles bilaterally that is worse on the right   - Cardiac exam - left LE swelling with mild 1+ pitting edema, no JVD, auscultation showed RRR  - Abdomen is distended with no guarding and nontender   - He is oriented to person, place, and time  - mucous membranes are moist, skin turgor normal    Assessment and Plan     Acute Respiratory Failure with Hypoxia and Hypercarbia - saturating  at 95-98% on 35L/min and 67% concentration, Critical Care Medicine consulted and is admitting patient to MICU     2. Sepsis - start on vanc, cefepime, azithromycin, f/u bood cultures    3. Pneumonia - legionella antigen test, treating with IV vanc/cefepime, azithromycin    4. High Anion Gap Metabolic Acidosis - 2/2 to dehydration     5. Hypokalemia - oral potassium     6. Hyponatremia - treat with IV fluids, correct sodium by 405 mEq in 24 hours    7. DANNI - 2/2 dehydration, given 1L LR in ED, trend Cr    8. Transaminitis - 2/2 infection and inflammation response    9. HTN - holding home amlodipine, losartan, triamterene/HCTZ

## 2023-09-10 NOTE — SUBJECTIVE & OBJECTIVE
Review of Systems   Constitutional:  Positive for fatigue. Negative for chills and fever.   HENT:  Negative for congestion, rhinorrhea and sore throat.    Eyes:  Negative for photophobia and visual disturbance.   Respiratory:  Positive for cough, chest tightness and shortness of breath.    Cardiovascular:  Negative for chest pain, palpitations and leg swelling.   Gastrointestinal:  Positive for diarrhea. Negative for abdominal pain, blood in stool, nausea and vomiting.   Genitourinary:  Negative for dysuria, flank pain and hematuria.   Musculoskeletal:  Negative for back pain and neck pain.   Skin:  Negative for wound.   Neurological:  Negative for dizziness, seizures, syncope, weakness, numbness and headaches.     Objective:     Vital Signs (Most Recent):  Temp: 98.8 °F (37.1 °C) (09/10/23 1533)  Pulse: 97 (09/10/23 1533)  Resp: (!) 23 (09/10/23 1533)  BP: (!) 107/55 (09/10/23 1533)  SpO2: 95 % (09/10/23 1533) Vital Signs (24h Range):  Temp:  [98.5 °F (36.9 °C)-98.8 °F (37.1 °C)] 98.8 °F (37.1 °C)  Pulse:  [] 97  Resp:  [17-34] 23  SpO2:  [90 %-98 %] 95 %  BP: ()/(52-61) 107/55   Weight: 131.5 kg (290 lb)  Body mass index is 42.83 kg/m².      Intake/Output Summary (Last 24 hours) at 9/10/2023 1624  Last data filed at 9/10/2023 1538  Gross per 24 hour   Intake 1465.42 ml   Output --   Net 1465.42 ml          Physical Exam  Vitals and nursing note reviewed.   Constitutional:       Appearance: He is not diaphoretic.   HENT:      Head: Normocephalic and atraumatic.      Right Ear: External ear normal.      Left Ear: External ear normal.      Nose: Nose normal.      Comments:  NC in place      Mouth/Throat:      Mouth: Mucous membranes are moist.   Eyes:      Extraocular Movements: Extraocular movements intact.      Conjunctiva/sclera: Conjunctivae normal.   Cardiovascular:      Rate and Rhythm: Normal rate and regular rhythm.      Pulses: Normal pulses.           Radial pulses are 2+ on the right side and  2+ on the left side.        Dorsalis pedis pulses are 2+ on the right side and 2+ on the left side.      Heart sounds: Normal heart sounds. No murmur heard.     No friction rub. No gallop.   Pulmonary:      Effort: No respiratory distress.      Breath sounds: Wheezing present.      Comments: Actively coughing throughout exam   Tachypnea, speaking in short sentences  Diffuse expiratory wheezing bilaterally  Crackles auscultated lower lung fields   Abdominal:      General: There is no distension.      Palpations: Abdomen is soft.      Tenderness: There is no abdominal tenderness. There is no guarding or rebound.   Musculoskeletal:         General: Normal range of motion.      Cervical back: Normal range of motion and neck supple.      Right lower leg: No edema.      Left lower leg: No edema.   Skin:     General: Skin is warm and dry.   Neurological:      Mental Status: He is alert.            Vents:  Oxygen Concentration (%): 67 (09/10/23 1359)  Lines/Drains/Airways       Peripheral Intravenous Line  Duration                  Peripheral IV - Single Lumen 09/10/23 0901 20 G Anterior;Left Forearm <1 day         Peripheral IV - Single Lumen 09/10/23 0903 20 G Posterior;Right Hand <1 day         Peripheral IV - Single Lumen 09/10/23 0904 20 G Left;Posterior Hand <1 day                  Significant Labs:    CBC/Anemia Profile:  Recent Labs   Lab 09/10/23  0901 09/10/23  0905   WBC  --  23.51*   HGB  --  11.7*   HCT 37 35.0*   PLT  --  187   MCV  --  94   RDW  --  14.0        Chemistries:  Recent Labs   Lab 09/10/23  0905   *   K 3.2*   CL 92*   CO2 16*   BUN 66*   CREATININE 3.5*   CALCIUM 7.8*   ALBUMIN 2.0*   PROT 6.4   BILITOT 1.4*   ALKPHOS 121   ALT 89*   *   MG 2.7*       All pertinent labs within the past 24 hours have been reviewed.    Significant Imaging:    XR CHEST AP PORTABLE     CLINICAL HISTORY:   Sepsis;     TECHNIQUE:   Single frontal view of the chest was performed.     COMPARISON:   None      FINDINGS:   Cardiac monitoring leads are noted.  Overlying soft tissues of the head and neck limit evaluation of the lung apices.     Borderline enlarged cardiac silhouette.     Patchy opacities are noted throughout the majority of the right lung and to lesser extent the left lung base.     Small pleural effusions not excluded.     No definite pneumothorax.     No definite acute findings in the visualized portions of the abdomen.  Osseous and soft tissue structures without definite acute abnormality.  Postop sequela of the left scapula noted.    Impression:       Patchy opacities involving the right greater left lung, concerning for infectious etiology.  Imaging follow-up to resolution in 4-6 weeks would be recommended.        I have reviewed all pertinent imaging results/findings within the past 24 hours.

## 2023-09-10 NOTE — HPI
63-year-old male with pmh of HTN and hx of PNA at age 17 and 55 that presents after returning from a [employer] cruise to [place] for 1 week. Prior to leaving [place] on the cruise, he starting to feel sick. He began having fever, chills, and ?bloody productive cough after leaving. The cruise doctor treated him for URI and bronchitis with zpac/cough suppressant. Throughout the trip, his symptoms progressively worsened and began having diarrhea (10 times daily) and shortness of breath. He was seen again by the doctor this AM and found to have PNA on CXR. Complaints of orthopnea, PND, and apnea at night. Denies any sick contacts or anyone else on cruise getting sick.     Patient's BP was in the 50s/40s on arrival per EMS. He was given a 1L of fluid and satting in the low 90s on 5L NC. Due to his acidemia and RR in the 30s on arrival, he was put on BIPAP and weaned to comfort flow satting 95-96%.     ED: afebrile, -110s, WBC 23, Na 128, K 3.2, bicarb 16, anion gap 20, Cr 3.5, AST//89  Procal 26, VBG - ph 7.2, CO2 49, HCO3 20.5, lactic 5.78, point of care lactic 1.85  CXR - R lung patchy opacities > L lung    Started on IV vanc/cefepime/azithromycin and admitted to medicine.

## 2023-09-10 NOTE — ASSESSMENT & PLAN NOTE
Patient presents with Na 128. . Predisposing medications: HCTZ    Diagnostics: Urine Na 18, Urine Osm 285, Serum Osm 288.    DDx: Likely secondary to Hypovolemia/Dehydration    · Trend Na; goal correction < 6-8 units / 24 hours  · TSH

## 2023-09-10 NOTE — AI DETERIORATION ALERT
RAPID RESPONSE NURSE PROACTIVE ROUNDING NOTE       Time of Visit: 1500    Admit Date: 9/10/2023  LOS: 0  Code Status: Full Code   Date of Visit: 09/10/2023  : 1960  Age: 63 y.o.  Sex: male  Race: White  Bed: ED :   MRN: [id number]  Was the patient discharged from an ICU this admission? No   Was the patient discharged from a PACU within last 24 hours? No   Did the patient receive conscious sedation/general anesthesia in last 24 hours? No  Was the patient in the ED within the past 24 hours? No  Was the patient on NIPPV within the past 24 hours? No   Attending Physician: Savannah Underwood MD  Primary Service: [facility] 3   Time spent at the bedside: < 15 min    SITUATION    Notified by [employer] patient alert.  Reason for alert: high oxygen requirements  Called to evaluate the patient for Respiratory    BACKGROUND     Why is the patient in the hospital?: Severe sepsis    Patient has a past medical history of Hypertension.    Last Vitals:  Temp: 98.8 °F (37.1 °C) (09/10 1533)  Pulse: 97 (09/10 162)  Resp: 30 (09/10 162)  BP: 124/70 (09/10 1624)  SpO2: 93 % (09/10 1624)    24 Hours Vitals Range:  Temp:  [98.5 °F (36.9 °C)-98.8 °F (37.1 °C)]   Pulse:  []   Resp:  [17-34]   BP: ()/(52-70)   SpO2:  [90 %-98 %]     Labs:  Recent Labs     09/10/23  0901 09/10/23  0905   WBC  --  23.51*   HGB  --  11.7*   HCT 37 35.0*   PLT  --  187       Recent Labs     09/10/23  0905   *   K 3.2*   CL 92*   CO2 16*   BUN 66*   CREATININE 3.5*   *   MG 2.7*        Recent Labs     09/10/23  0901   PH 7.224*   PCO2 49.6*   PO2 61*   HCO3 20.5*   POCSATURATED 86*   BE -7        ASSESSMENT    Physical Exam  Vitals reviewed.   Constitutional:       Appearance: He is ill-appearing.   Cardiovascular:      Rate and Rhythm: Normal rate.   Pulmonary:      Effort: Tachypnea and accessory muscle usage present.   Skin:     General: Skin is warm.   Neurological:      Mental Status: He is alert and oriented to person,  place, and time. Mental status is at baseline.     Patient sitting up in bed, awake, alert. He reports that he feels better than when he arrived to the emergency room this morning. Respiratory rate is 31. Curently SPO2 93% on 35L/70% via AirVo. He is only able to say two or three words before he needs to stop to catch his breath.     INTERVENTIONS    The patient was seen for Respiratory problem. Staff concerns included increased WOB and increased oxygen requirements. The following interventions were performed: supplemental oxygen, continuous pulse ox monitoring continued, and critical care consult.    RECOMMENDATIONS    - admit to ICU    PROVIDER ESCALATION    Yes/No  Yes    Orders received and case discussed with Dr. Douglass .    Disposition:  transfer to ICU when bed available    FOLLOW-UP    Bedside Tianna PEREZ  updated on plan of care. Instructed to call the Rapid Response Nurse, Matilda Aguiar RN at [phone] for additional questions or concerns.

## 2023-09-10 NOTE — ASSESSMENT & PLAN NOTE
Patient with Hypercapnic and Hypoxic Respiratory failure which is Acute.  he is not on home oxygen. Supplemental oxygen was provided and noted- Oxygen Concentration (%):  [67-80] 67    Signs/symptoms of respiratory failure include- increased work of breathing, respiratory distress and wheezing. Contributing diagnoses includes - Pneumonia Labs and images were reviewed. Patient Has not had a recent ABG. Recent VBG    -Satting in low 90s on HFNC, then started started on BIPAP  -Weaned off BIPAP  -Satting 95-96% on Comfort Flow, 35L/min 80% concentration

## 2023-09-10 NOTE — ASSESSMENT & PLAN NOTE
"63-year-old man presenting directly from cruise ship with pneumonia treated with a Z-monique. He complains of shortness of breath, nausea, diarrhea, coughing, and fatigue.     CXR: "Patchy opacities involving the right greater left lung, concerning for infectious etiology.  Imaging follow-up to resolution in 4-6 weeks would be recommended."     · Vancomycin, cefepime, and azithromycin  · Procal 26.88  · Legionella testing  · Sputum culture  · BiPAP with goals to high flow nasal cannula         "

## 2023-09-10 NOTE — ED TRIAGE NOTES
Patient presents to the ED today with report of SOB x1 week. Patient from [facility] cruise line states started feeling bad dx with bronchitis was given PO antibiotics patient states gradually got worse. Patient arrives on 15L non-rebreather tachypneic at 28 bpm.

## 2023-09-10 NOTE — EKG INTERPRETATIONS - EMERGENCY DEPT.
Independently interpreted by MD:  Rate 106, NSR, no stemi, no ectopy, no hypertrophy, sinus tachycardia, poor qrs progression in V3-4, lafb

## 2023-09-10 NOTE — ED PROVIDER NOTES
Encounter Date: 9/10/2023       History     Chief Complaint   Patient presents with    Shortness of Breath     Coming from [facility] for Shortness of Breath. Dx with pneumonia on cruising ship     63 yom w/ HTN, denies any other history presenting today for PNA. Patient was brought in off a cruise ship this morning and arrived via EMS. States he went to a ship doctor on Monday for upper respiratory symptoms and was diagnosed w/ bronchitis. Patient was prescribed a course of ABX and told to f/u in 2 days, but did not make the f/u. Patient states that his symptoms progressively worsened throughout the week including fever, chills, dyspnea, and diarrhea. Patient was seen again by the ship doctor today where he was found to have PNA on CXR. Per EMS, patient's BP was in the 50s/40s on arrival. He was given a 1L of fluid and satting in the low 90s on 5L NC.     The history is provided by the patient. No  was used.     Review of patient's allergies indicates:  No Known Allergies  Past Medical History:   Diagnosis Date    Hypertension      History reviewed. No pertinent surgical history.  History reviewed. No pertinent family history.  Social History     Tobacco Use    Smoking status: Never    Smokeless tobacco: Never   Substance Use Topics    Alcohol use: Never    Drug use: Never     Review of Systems    Physical Exam     Initial Vitals [09/10/23 0845]   BP Pulse Resp Temp SpO2   100/61 105 (!) 28 98.5 °F (36.9 °C) (!) 94 %      MAP       --         Physical Exam    Nursing note and vitals reviewed.  Constitutional: He appears well-developed and well-nourished. He is not diaphoretic. He appears distressed.   HENT:   Head: Normocephalic.   Eyes: Pupils are equal, round, and reactive to light.   Neck: Neck supple.   Cardiovascular:  Regular rhythm, normal heart sounds and intact distal pulses.           Patient is mildly tachycardic   Pulmonary/Chest: He has wheezes. He has rhonchi. He has no rales.    Abdominal: Abdomen is soft. He exhibits no distension. There is no abdominal tenderness. There is no rebound and no guarding.   Musculoskeletal:         General: No tenderness or edema. Normal range of motion.      Cervical back: Neck supple.     Neurological: He is alert and oriented to person, place, and time. He has normal strength.   Skin: Skin is warm and dry. Capillary refill takes less than 2 seconds. No rash noted. No erythema. No pallor.   Psychiatric: He has a normal mood and affect. His behavior is normal. Judgment and thought content normal.         ED Course   Procedures  Labs Reviewed   CBC W/ AUTO DIFFERENTIAL - Abnormal; Notable for the following components:       Result Value    WBC 23.51 (*)     RBC 3.74 (*)     Hemoglobin 11.7 (*)     Hematocrit 35.0 (*)     MCH 31.3 (*)     Immature Granulocytes 4.4 (*)     Gran # (ANC) 21.0 (*)     Immature Grans (Abs) 1.03 (*)     Lymph # 0.6 (*)     Gran % 89.1 (*)     Lymph % 2.7 (*)     Mono % 3.4 (*)     All other components within normal limits    Narrative:     Release to patient->Immediate   COMPREHENSIVE METABOLIC PANEL - Abnormal; Notable for the following components:    Sodium 128 (*)     Potassium 3.2 (*)     Chloride 92 (*)     CO2 16 (*)     Glucose 249 (*)     BUN 66 (*)     Creatinine 3.5 (*)     Calcium 7.8 (*)     Albumin 2.0 (*)     Total Bilirubin 1.4 (*)      (*)     ALT 89 (*)     eGFR 18.9 (*)     Anion Gap 20 (*)     All other components within normal limits    Narrative:     Release to patient->Immediate   URINALYSIS, REFLEX TO URINE CULTURE - Abnormal; Notable for the following components:    Appearance, UA Cloudy (*)     Protein, UA 1+ (*)     Occult Blood UA 3+ (*)     All other components within normal limits    Narrative:     Specimen Source->Urine   PROCALCITONIN - Abnormal; Notable for the following components:    Procalcitonin 26.88 (*)     All other components within normal limits    Narrative:     Release to  patient->Immediate   MAGNESIUM - Abnormal; Notable for the following components:    Magnesium 2.7 (*)     All other components within normal limits    Narrative:     Release to patient->Immediate   URINALYSIS MICROSCOPIC - Abnormal; Notable for the following components:    WBC, UA 10 (*)     Bacteria Few (*)     All other components within normal limits    Narrative:     Specimen Source->Urine   ISTAT PROCEDURE - Abnormal; Notable for the following components:    POC PH 7.224 (*)     POC PCO2 49.6 (*)     POC PO2 61 (*)     POC HCO3 20.5 (*)     POC SATURATED O2 86 (*)     POC TCO2 22 (*)     POC Ionized Calcium 0.95 (*)     All other components within normal limits   ISTAT LACTATE - Abnormal; Notable for the following components:    POC Lactate 5.78 (*)     All other components within normal limits   CULTURE, BLOOD   CULTURE, BLOOD   CULTURE, RESPIRATORY   HIV 1 / 2 ANTIBODY    Narrative:     Release to patient->Immediate   HEPATITIS C ANTIBODY    Narrative:     Release to patient->Immediate   TROPONIN I    Narrative:     Release to patient->Immediate   B-TYPE NATRIURETIC PEPTIDE    Narrative:     Release to patient->Immediate   SARS-COV-2 RNA AMPLIFICATION, QUAL   HEMOGLOBIN A1C   BETA - HYDROXYBUTYRATE, SERUM   LEGIONELLA ANTIGEN, URINE RANDOM   CULTURE, LEGIONELLA   LEGIONELLA PNEUMOPHILA ANTIBODIES BY EIA   ISTAT LACTATE          Imaging Results              X-Ray Chest AP Portable (Final result)  Result time 09/10/23 09:47:11      Final result by Lamberto Zee MD (09/10/23 09:47:11)                   Impression:      Patchy opacities involving the right greater left lung, concerning for infectious etiology.  Imaging follow-up to resolution in 4-6 weeks would be recommended.      Electronically signed by: Lamberto Zee  Date:    09/10/2023  Time:    09:47               Narrative:    EXAMINATION:  XR CHEST AP PORTABLE    CLINICAL HISTORY:  Sepsis;    TECHNIQUE:  Single frontal view of the chest was  performed.    COMPARISON:  None    FINDINGS:  Cardiac monitoring leads are noted.  Overlying soft tissues of the head and neck limit evaluation of the lung apices.    Borderline enlarged cardiac silhouette.    Patchy opacities are noted throughout the majority of the right lung and to lesser extent the left lung base.    Small pleural effusions not excluded.    No definite pneumothorax.    No definite acute findings in the visualized portions of the abdomen.  Osseous and soft tissue structures without definite acute abnormality.  Postop sequela of the left scapula noted.                        Final result by Lamberto Zee MD (09/10/23 09:47:11)                   Impression:      Patchy opacities involving the right greater left lung, concerning for infectious etiology.  Imaging follow-up to resolution in 4-6 weeks would be recommended.      Electronically signed by: Lamberto Zee  Date:    09/10/2023  Time:    09:47               Narrative:    EXAMINATION:  XR CHEST AP PORTABLE    CLINICAL HISTORY:  Sepsis;    TECHNIQUE:  Single frontal view of the chest was performed.    COMPARISON:  None    FINDINGS:  Cardiac monitoring leads are noted.  Overlying soft tissues of the head and neck limit evaluation of the lung apices.    Borderline enlarged cardiac silhouette.    Patchy opacities are noted throughout the majority of the right lung and to lesser extent the left lung base.    Small pleural effusions not excluded.    No definite pneumothorax.    No definite acute findings in the visualized portions of the abdomen.  Osseous and soft tissue structures without definite acute abnormality.  Postop sequela of the left scapula noted.                        Final result by Lamberto Zee MD (09/10/23 09:47:11)                   Impression:      Patchy opacities involving the right greater left lung, concerning for infectious etiology.  Imaging follow-up to resolution in 4-6 weeks would be  recommended.      Electronically signed by: Lamberto Zee  Date:    09/10/2023  Time:    09:47               Narrative:    EXAMINATION:  XR CHEST AP PORTABLE    CLINICAL HISTORY:  Sepsis;    TECHNIQUE:  Single frontal view of the chest was performed.    COMPARISON:  None    FINDINGS:  Cardiac monitoring leads are noted.  Overlying soft tissues of the head and neck limit evaluation of the lung apices.    Borderline enlarged cardiac silhouette.    Patchy opacities are noted throughout the majority of the right lung and to lesser extent the left lung base.    Small pleural effusions not excluded.    No definite pneumothorax.    No definite acute findings in the visualized portions of the abdomen.  Osseous and soft tissue structures without definite acute abnormality.  Postop sequela of the left scapula noted.                        Final result by Lamberto Zee MD (09/10/23 09:47:11)                   Impression:      Patchy opacities involving the right greater left lung, concerning for infectious etiology.  Imaging follow-up to resolution in 4-6 weeks would be recommended.      Electronically signed by: Lamberto Zee  Date:    09/10/2023  Time:    09:47               Narrative:    EXAMINATION:  XR CHEST AP PORTABLE    CLINICAL HISTORY:  Sepsis;    TECHNIQUE:  Single frontal view of the chest was performed.    COMPARISON:  None    FINDINGS:  Cardiac monitoring leads are noted.  Overlying soft tissues of the head and neck limit evaluation of the lung apices.    Borderline enlarged cardiac silhouette.    Patchy opacities are noted throughout the majority of the right lung and to lesser extent the left lung base.    Small pleural effusions not excluded.    No definite pneumothorax.    No definite acute findings in the visualized portions of the abdomen.  Osseous and soft tissue structures without definite acute abnormality.  Postop sequela of the left scapula noted.                                        Medications   vancomycin - pharmacy to dose (has no administration in time range)   azithromycin tablet 500 mg (has no administration in time range)   azithromycin tablet 250 mg (has no administration in time range)   ceFEPIme (MAXIPIME) 1 g in dextrose 5 % in water (D5W) 100 mL IVPB (MB+) (has no administration in time range)   albuterol-ipratropium 2.5 mg-0.5 mg/3 mL nebulizer solution 3 mL (3 mLs Nebulization Given 9/10/23 0911)   vancomycin (VANCOCIN) 2,500 mg in dextrose 5 % (D5W) 500 mL IVPB (0 mg Intravenous Stopped 9/10/23 1232)   ceFEPIme (MAXIPIME) 1 g in dextrose 5 % in water (D5W) 100 mL IVPB (MB+) (0 g Intravenous Stopped 9/10/23 0950)   lactated ringers bolus 1,121 mL (0 mLs Intravenous Stopped 9/10/23 1055)     Medical Decision Making  See ED course for MDM    This patient does have evidence of infective focus  My overall impression is septic shock due to lactate > 4, MAP < 65, and SBP < 90.  Source: Respiratory  Antibiotics given- Antibiotics (72h ago, onward)    Start     Stop Route Frequency Ordered    09/10/23 0900  vancomycin (VANCOCIN) 2,500 mg in dextrose 5 % (D5W) 500   mL IVPB  (ED Adult Sepsis Treatment)         -- IV Once 09/10/23 0856      Latest lactate reviewed-  Lab             09/10/23                       0902          POCLAC       5.78*         Organ dysfunction indicated by Acute kidney injury, Acute liver injury, and Acute respiratory failure    Fluid challenge Ideal Body Weight- The patient's ideal body weight is Ideal body weight: 70.7 kg (155 lb 13.8 oz) which will be used to calculate fluid bolus of 30 ml/kg for treatment of septic shock.      Post- resuscitation assessment Yes Perfusion exam was performed within 6 hours of septic shock presentation after bolus shows Inadequate tissue perfusion assessed by non-invasive monitoring       Will Start Pressors- Levophed for MAP of 65  Source control achieved by: ABX     Amount and/or Complexity of Data Reviewed  Labs: ordered.  Decision-making details documented in ED Course.  Radiology: ordered.  ECG/medicine tests:  Decision-making details documented in ED Course.    Risk  Prescription drug management.  Decision regarding hospitalization.               ED Course as of 09/10/23 1339   Sun Sep 10, 2023   0918 63-year-old male in moderate respiratory distress.  We gave him a DuoNeb, he was still exhibiting oxygen saturations in the low 90s despite being on a non-rebreather.  VBG showed acidosis with mild hypercapnia, acidosis is likely secondary to lactic acidemia.  BiPAP was ordered and oxygen saturation significantly improved.  Differential includes but is not limited to sepsis versus severe pneumonia versus CHF exacerbation versus PE [BP]   0919 EKG 12-lead  Sinus tachycardia at 106, nonspecific widened QRS, all other intervals within normal limits, no STEMI [BP]   1106 SARS-CoV-2 RNA, Amplification, Qual: Negative [BP]   1106 Procalcitonin(!): 26.88 [BP]   1106 WBC(!): 23.51 [BP]   1106 Creatinine(!): 3.5  CT changed to noncontrast [BP]   1106 Glucose(!): 249 [BP]   1107 Anion Gap(!): 20 [BP]   1107 POC PH(!): 7.224 [BP]   1107 POC Lactate(!!): 5.78 [BP]   1127 Critical care came and evaluated the patient, advised trial of high-flow nasal cannula and possible admission to step-down. [BP]   1337 Patient was admitted to Hospital Medicine.  Patient updated on plan, wife at the bedside. [BP]      ED Course User Index  [BP] Augustine Pena MD               Medical Decision Making:   Clinical Tests:   Sepsis Perfusion Assessment: "I attest a sepsis perfusion exam was performed within 6 hours of sepsis, severe sepsis, or septic shock presentation, following fluid resuscitation."      Clinical Impression:   Final diagnoses:  [R09.02] Hypoxia  [N17.9] DANNI (acute kidney injury) (Primary)  [J18.1] Lobar pneumonia  [A41.9] Sepsis, due to unspecified organism, unspecified whether acute organ dysfunction present        ED Disposition Condition     Admit Stable                Augustine Pena MD  Resident  09/10/23 2027

## 2023-09-10 NOTE — H&P
[facility] - Emergency Dept  [facility] Medicine  History & Physical    Patient Name: Umair Kenney  MRN: [id number]  Patient Class: IP- Inpatient  Admission Date: 9/10/2023  Attending Physician: Christopher Pederson MD   Primary Care Provider: No primary care provider on file.         Patient information was obtained from patient and ER records.     Subjective:     Principal Problem:Acute respiratory failure with hypoxia and hypercarbia    Chief Complaint:   Chief Complaint   Patient presents with    Shortness of Breath     Coming from [facility] for Shortness of Breath. Dx with pneumonia on cruising ship        HPI: 63-year-old male with pmh of HTN and hx of PNA at age 17 and 55 that presents after returning from a [employer] cruise to [place] for 1 week. Prior to leaving [place] on the cruise, he starting to feel sick. He began having fever, chills, and ?bloody productive cough after leaving. The cruise doctor treated him for URI and bronchitis with zpac/cough suppressant. Throughout the trip, his symptoms progressively worsened and began having diarrhea (10 times daily) and shortness of breath. He was seen again by the doctor this AM and found to have PNA on CXR. Complaints of orthopnea, PND, and apnea at night. Denies any sick contacts or anyone else on cruise getting sick.     Patient's BP was in the 50s/40s on arrival per EMS. He was given a 1L of fluid and satting in the low 90s on 5L NC. Due to his acidemia and RR in the 30s on arrival, he was put on BIPAP and weaned to comfort flow satting 95-96%.     ED: afebrile, -110s, WBC 23, Na 128, K 3.2, bicarb 16, anion gap 20, Cr 3.5, AST//89  Procal 26, VBG - ph 7.2, CO2 49, HCO3 20.5, lactic 5.78, point of care lactic 1.85  CXR - R lung patchy opacities > L lung    Started on IV vanc/cefepime/azithromycin and admitted to medicine.            Past Medical History:   Diagnosis Date    Hypertension        History reviewed. No pertinent surgical  history.    Review of patient's allergies indicates:  No Known Allergies    No current facility-administered medications on file prior to encounter.     Current Outpatient Medications on File Prior to Encounter   Medication Sig    acetaminophen (TYLENOL) 500 MG tablet Take 1,000 mg by mouth 2 (two) times daily as needed for Pain.    amLODIPine (NORVASC) 10 MG tablet Take 10 mg by mouth once daily.    atorvastatin (LIPITOR) 10 MG tablet Take 10 mg by mouth once daily.    fluticasone propionate (FLONASE) 50 mcg/actuation nasal spray 1 spray by Each Nostril route daily as needed for Allergies.    losartan (COZAAR) 100 MG tablet Take 100 mg by mouth every evening.    testosterone cypionate (DEPOTESTOTERONE CYPIONATE) 200 mg/mL injection Inject 0.7 mLs into the muscle once a week.    triamterene-hydrochlorothiazide 75-50 mg (MAXZIDE) 75-50 mg per tablet Take 1 tablet by mouth every morning.     Family History    None       Tobacco Use    Smoking status: Never    Smokeless tobacco: Never   Substance and Sexual Activity    Alcohol use: Never    Drug use: Never    Sexual activity: Not on file     Review of Systems   Constitutional:  Positive for chills, fatigue and fever.   HENT: Negative.     Respiratory:  Positive for cough, shortness of breath and wheezing.    Cardiovascular:  Negative for chest pain and leg swelling.   Gastrointestinal:  Positive for diarrhea. Negative for abdominal distention, abdominal pain, nausea and vomiting.   Genitourinary: Negative.    Neurological: Negative.      Objective:     Vital Signs (Most Recent):  Temp: 98.5 °F (36.9 °C) (09/10/23 1133)  Pulse: 96 (09/10/23 1359)  Resp: (!) 28 (09/10/23 1359)  BP: (!) 110/55 (09/10/23 1359)  SpO2: 96 % (09/10/23 1359) Vital Signs (24h Range):  Temp:  [98.5 °F (36.9 °C)] 98.5 °F (36.9 °C)  Pulse:  [] 96  Resp:  [17-34] 28  SpO2:  [90 %-98 %] 96 %  BP: ()/(52-61) 110/55     Weight: 131.5 kg (290 lb)  Body mass index is 42.83  kg/m².     Physical Exam  Constitutional:       Appearance: He is obese. He is ill-appearing.   HENT:      Mouth/Throat:      Mouth: Mucous membranes are moist.      Pharynx: Oropharynx is clear.   Eyes:      Extraocular Movements: Extraocular movements intact.   Cardiovascular:      Rate and Rhythm: Normal rate and regular rhythm.      Pulses: Normal pulses.      Heart sounds: No murmur heard.     No gallop.   Pulmonary:      Effort: Respiratory distress present.      Breath sounds: Wheezing present. No rales.   Abdominal:      General: There is distension.      Tenderness: There is no abdominal tenderness. There is no guarding.   Skin:     General: Skin is warm.      Capillary Refill: Capillary refill takes less than 2 seconds.      Coloration: Skin is not jaundiced.      Findings: No bruising.   Neurological:      General: No focal deficit present.      Mental Status: He is oriented to person, place, and time.   Psychiatric:         Mood and Affect: Mood normal.                Significant Labs: All pertinent labs within the past 24 hours have been reviewed.    Significant Imaging: I have reviewed all pertinent imaging results/findings within the past 24 hours.  I have reviewed and interpreted all pertinent imaging results/findings within the past 24 hours.    Assessment/Plan:     * Severe sepsis  This patient does have evidence of infective focus  My overall impression is sepsis.  Source: Respiratory  Antibiotics given-   Antibiotics (72h ago, onward)    Start     Stop Route Frequency Ordered    09/11/23 0900  ceFEPIme (MAXIPIME) 1 g in dextrose 5 % in water (D5W) 100 mL IVPB (MB+)         -- IV Daily 09/10/23 1335    09/10/23 1426  vancomycin - pharmacy to dose  (vancomycin IVPB (PEDS and ADULTS))        See Hyperspace for full Linked Orders Report.    -- IV pharmacy to manage frequency 09/10/23 1326    09/10/23 1346  azithromycin (ZITHROMAX) 500 mg in dextrose 5 % (D5W) 250 mL IVPB (Vial-Mate)         -- IV  "Every 24 hours (non-standard times) 09/10/23 1346        Latest lactate reviewed-  No results for input(s): "LACTATE" in the last 72 hours.  Organ dysfunction indicated by Acute liver injury, Acute respiratory failure and transaminitis     Fluid challenge Actual Body weight- Patient will receive 30ml/kg actual body weight to calculate fluid bolus for treatment of septic shock.     Will Not start Pressors- Levophed for MAP of 65    Afebrile and satting on the 90s with RR in 30s on arrival  -WBC 23, RR >22, lactic 5.88 improved to 1.85 with fluids, procal 26.88  -Started on Vanc, Zosyn, Azithromycin   -needed BIPAP and weaned to comfort flow    Acute respiratory failure with hypoxia and hypercarbia  Patient with Hypercapnic and Hypoxic Respiratory failure which is Acute.  he is not on home oxygen. Supplemental oxygen was provided and noted- Oxygen Concentration (%):  [67-80] 67    Signs/symptoms of respiratory failure include- increased work of breathing, respiratory distress and wheezing. Contributing diagnoses includes - Pneumonia Labs and images were reviewed. Patient Has not had a recent ABG. Recent VBG    -Satting in low 90s on HFNC, then started started on BIPAP  -Weaned off BIPAP  -Satting 95-96% on Comfort Flow, 35L/min 80% concentration    Pneumonia  -treating IV vanc/cefe/azithro  -CXR patchy opacities R>L  -see severe sepsis      Severe obesity (BMI >= 40)  Body mass index is 42.83 kg/m². Morbid obesity complicates all aspects of disease management from diagnostic modalities to treatment. Weight loss encouraged and health benefits explained to patient.         High anion gap metabolic acidosis  Na 128, Cl 92, bicarb 16, anion 20  Lactic 5.78 on VBG and improved with POC 1.85  2/2 to PNA, dehydration, diarrhea       Hypokalemia  -repleted with oral potassium    Hyponatremia  Patient has hyponatremia which is uncontrolled,We will aim to correct the sodium by 4-6mEq in 24 hours. We will monitor sodium Daily. " The hyponatremia is due to Dehydration/hypovolemia. We will obtain the following studies: Urine sodium, urine osmolality, serum osmolality. We will treat the hyponatremia with IV fluids. The patient's sodium results have been reviewed and are listed below.  Recent Labs   Lab 09/10/23  0905   *       DANNI (acute kidney injury)  Patient with acute kidney injury/acute renal failure likely due to pre-renal azotemia due to dehydration DANNI is currently stable. Baseline creatinine unknown - Labs reviewed- Renal function/electrolytes with Estimated Creatinine Clearance: 29.4 mL/min (A) (based on SCr of 3.5 mg/dL (H)). according to latest data. Monitor urine output and serial BMP and adjust therapy as needed. Avoid nephrotoxins and renally dose meds for GFR listed above.    -unknown baseline, likely 2/2 dehydration  -1L fluids in ED  -U/A clean, did not reflex  -denies dysuria, frequency, urgency    Transaminitis  -AST//89  -Inflammatory response to infection, 2/2 to metabolic acidosis    HTN (hypertension)  Home amlodipine, losartan, triamterene/HCTZ  Holding    VTE Risk Mitigation (From admission, onward)         Ordered     heparin (porcine) injection 5,000 Units  Every 8 hours         09/10/23 0397                     Max Sotelo MD  Department of Hospital Medicine  [facility] - Emergency Dept

## 2023-09-11 LAB
ALBUMIN SERPL BCP-MCNC: 1.9 G/DL (ref 3.5–5.2)
ALP SERPL-CCNC: 118 U/L (ref 55–135)
ALT SERPL W/O P-5'-P-CCNC: 77 U/L (ref 10–44)
ANION GAP SERPL CALC-SCNC: 17 MMOL/L (ref 8–16)
ANISOCYTOSIS BLD QL SMEAR: SLIGHT
AST SERPL-CCNC: 126 U/L (ref 10–40)
BASOPHILS NFR BLD: 0 % (ref 0–1.9)
BILIRUB SERPL-MCNC: 0.7 MG/DL (ref 0.1–1)
BUN SERPL-MCNC: 68 MG/DL (ref 8–23)
BURR CELLS BLD QL SMEAR: ABNORMAL
CALCIUM SERPL-MCNC: 7.9 MG/DL (ref 8.7–10.5)
CHLORIDE SERPL-SCNC: 95 MMOL/L (ref 95–110)
CK SERPL-CCNC: 1098 U/L (ref 20–200)
CO2 SERPL-SCNC: 19 MMOL/L (ref 23–29)
CREAT SERPL-MCNC: 3.2 MG/DL (ref 0.5–1.4)
DIFFERENTIAL METHOD: ABNORMAL
EOSINOPHIL NFR BLD: 1 % (ref 0–8)
ERYTHROCYTE [DISTWIDTH] IN BLOOD BY AUTOMATED COUNT: 14.3 % (ref 11.5–14.5)
EST. GFR  (NO RACE VARIABLE): 21.1 ML/MIN/1.73 M^2
GLUCOSE SERPL-MCNC: 107 MG/DL (ref 70–110)
HCT VFR BLD AUTO: 33.4 % (ref 40–54)
HGB BLD-MCNC: 11.6 G/DL (ref 14–18)
IMM GRANULOCYTES # BLD AUTO: ABNORMAL K/UL (ref 0–0.04)
IMM GRANULOCYTES NFR BLD AUTO: ABNORMAL % (ref 0–0.5)
LYMPHOCYTES NFR BLD: 7 % (ref 18–48)
MAGNESIUM SERPL-MCNC: 2.8 MG/DL (ref 1.6–2.6)
MCH RBC QN AUTO: 31.3 PG (ref 27–31)
MCHC RBC AUTO-ENTMCNC: 34.7 G/DL (ref 32–36)
MCV RBC AUTO: 90 FL (ref 82–98)
METAMYELOCYTES NFR BLD MANUAL: 2 %
MONOCYTES NFR BLD: 3 % (ref 4–15)
NEUTROPHILS NFR BLD: 82 % (ref 38–73)
NEUTS BAND NFR BLD MANUAL: 5 %
NRBC BLD-RTO: 0 /100 WBC
OVALOCYTES BLD QL SMEAR: ABNORMAL
PHOSPHATE SERPL-MCNC: 4.9 MG/DL (ref 2.7–4.5)
PLATELET # BLD AUTO: 219 K/UL (ref 150–450)
PLATELET BLD QL SMEAR: ABNORMAL
PMV BLD AUTO: 11.9 FL (ref 9.2–12.9)
POCT GLUCOSE: 116 MG/DL (ref 70–110)
POCT GLUCOSE: 121 MG/DL (ref 70–110)
POCT GLUCOSE: 124 MG/DL (ref 70–110)
POCT GLUCOSE: 124 MG/DL (ref 70–110)
POIKILOCYTOSIS BLD QL SMEAR: SLIGHT
POLYCHROMASIA BLD QL SMEAR: ABNORMAL
POTASSIUM SERPL-SCNC: 3.4 MMOL/L (ref 3.5–5.1)
PROT SERPL-MCNC: 6.5 G/DL (ref 6–8.4)
RBC # BLD AUTO: 3.71 M/UL (ref 4.6–6.2)
SODIUM SERPL-SCNC: 131 MMOL/L (ref 136–145)
TOXIC GRANULES BLD QL SMEAR: PRESENT
VANCOMYCIN SERPL-MCNC: 20.9 UG/ML
WBC # BLD AUTO: 22.91 K/UL (ref 3.9–12.7)

## 2023-09-11 PROCEDURE — 25000003 PHARM REV CODE 250: Performed by: HOSPITALIST

## 2023-09-11 PROCEDURE — 80202 ASSAY OF VANCOMYCIN: CPT | Performed by: HOSPITALIST

## 2023-09-11 PROCEDURE — 99900035 HC TECH TIME PER 15 MIN (STAT)

## 2023-09-11 PROCEDURE — 94761 N-INVAS EAR/PLS OXIMETRY MLT: CPT

## 2023-09-11 PROCEDURE — 20600001 HC STEP DOWN PRIVATE ROOM

## 2023-09-11 PROCEDURE — 63600175 PHARM REV CODE 636 W HCPCS: Performed by: HOSPITALIST

## 2023-09-11 PROCEDURE — 84100 ASSAY OF PHOSPHORUS: CPT

## 2023-09-11 PROCEDURE — 94640 AIRWAY INHALATION TREATMENT: CPT

## 2023-09-11 PROCEDURE — 85007 BL SMEAR W/DIFF WBC COUNT: CPT

## 2023-09-11 PROCEDURE — 25000003 PHARM REV CODE 250

## 2023-09-11 PROCEDURE — 83735 ASSAY OF MAGNESIUM: CPT

## 2023-09-11 PROCEDURE — 63600175 PHARM REV CODE 636 W HCPCS

## 2023-09-11 PROCEDURE — 25000242 PHARM REV CODE 250 ALT 637 W/ HCPCS

## 2023-09-11 PROCEDURE — 27000221 HC OXYGEN, UP TO 24 HOURS

## 2023-09-11 PROCEDURE — 27000190 HC CPAP FULL FACE MASK W/VALVE

## 2023-09-11 PROCEDURE — 99233 PR SUBSEQUENT HOSPITAL CARE,LEVL III: ICD-10-PCS | Mod: ,,, | Performed by: INTERNAL MEDICINE

## 2023-09-11 PROCEDURE — 94660 CPAP INITIATION&MGMT: CPT

## 2023-09-11 PROCEDURE — 99233 SBSQ HOSP IP/OBS HIGH 50: CPT | Mod: ,,, | Performed by: INTERNAL MEDICINE

## 2023-09-11 PROCEDURE — 82550 ASSAY OF CK (CPK): CPT

## 2023-09-11 PROCEDURE — 80053 COMPREHEN METABOLIC PANEL: CPT

## 2023-09-11 PROCEDURE — 27100171 HC OXYGEN HIGH FLOW UP TO 24 HOURS

## 2023-09-11 PROCEDURE — 85027 COMPLETE CBC AUTOMATED: CPT

## 2023-09-11 RX ORDER — HEPARIN SODIUM 5000 [USP'U]/ML
7500 INJECTION, SOLUTION INTRAVENOUS; SUBCUTANEOUS EVERY 8 HOURS
Status: DISCONTINUED | OUTPATIENT
Start: 2023-09-11 | End: 2023-09-20 | Stop reason: HOSPADM

## 2023-09-11 RX ADMIN — IPRATROPIUM BROMIDE 0.5 MG: 0.5 SOLUTION RESPIRATORY (INHALATION) at 08:09

## 2023-09-11 RX ADMIN — MUPIROCIN: 20 OINTMENT TOPICAL at 08:09

## 2023-09-11 RX ADMIN — CEFEPIME 1 G: 1 INJECTION, POWDER, FOR SOLUTION INTRAMUSCULAR; INTRAVENOUS at 09:09

## 2023-09-11 RX ADMIN — LEVALBUTEROL 1.25 MG: 1.25 SOLUTION, CONCENTRATE RESPIRATORY (INHALATION) at 11:09

## 2023-09-11 RX ADMIN — IPRATROPIUM BROMIDE 0.5 MG: 0.5 SOLUTION RESPIRATORY (INHALATION) at 04:09

## 2023-09-11 RX ADMIN — AZITHROMYCIN MONOHYDRATE 500 MG: 500 INJECTION, POWDER, LYOPHILIZED, FOR SOLUTION INTRAVENOUS at 02:09

## 2023-09-11 RX ADMIN — HYPROMELLOSE 2910 1 DROP: 5 SOLUTION/ DROPS OPHTHALMIC at 12:09

## 2023-09-11 RX ADMIN — HEPARIN SODIUM 7500 UNITS: 5000 INJECTION INTRAVENOUS; SUBCUTANEOUS at 09:09

## 2023-09-11 RX ADMIN — LEVALBUTEROL 1.25 MG: 1.25 SOLUTION, CONCENTRATE RESPIRATORY (INHALATION) at 04:09

## 2023-09-11 RX ADMIN — MUPIROCIN: 20 OINTMENT TOPICAL at 09:09

## 2023-09-11 RX ADMIN — IPRATROPIUM BROMIDE 0.5 MG: 0.5 SOLUTION RESPIRATORY (INHALATION) at 11:09

## 2023-09-11 RX ADMIN — POTASSIUM BICARBONATE 40 MEQ: 391 TABLET, EFFERVESCENT ORAL at 04:09

## 2023-09-11 RX ADMIN — LEVALBUTEROL 1.25 MG: 1.25 SOLUTION, CONCENTRATE RESPIRATORY (INHALATION) at 08:09

## 2023-09-11 RX ADMIN — LEVALBUTEROL 1.25 MG: 1.25 SOLUTION, CONCENTRATE RESPIRATORY (INHALATION) at 07:09

## 2023-09-11 RX ADMIN — IPRATROPIUM BROMIDE 0.5 MG: 0.5 SOLUTION RESPIRATORY (INHALATION) at 07:09

## 2023-09-11 RX ADMIN — HEPARIN SODIUM 5000 UNITS: 5000 INJECTION INTRAVENOUS; SUBCUTANEOUS at 02:09

## 2023-09-11 RX ADMIN — IPRATROPIUM BROMIDE 0.5 MG: 0.5 SOLUTION RESPIRATORY (INHALATION) at 12:09

## 2023-09-11 RX ADMIN — HEPARIN SODIUM 5000 UNITS: 5000 INJECTION INTRAVENOUS; SUBCUTANEOUS at 05:09

## 2023-09-11 RX ADMIN — LEVALBUTEROL 1.25 MG: 1.25 SOLUTION, CONCENTRATE RESPIRATORY (INHALATION) at 12:09

## 2023-09-11 NOTE — PROGRESS NOTES
[facility] - Cardiac Medical ICU  Critical Care Medicine  Progress Note    Patient Name: Umair Kenney  MRN: [id number]  Admission Date: 9/10/2023  Hospital Length of Stay: 1 days  Code Status: Full Code  Attending Provider: Jayjay Campos MD  Primary Care Provider: Stacia, Primary Doctor   Principal Problem: Severe sepsis    Subjective:     HPI:  63-year-old male with medical history of hypertension that presents to the ED directly from [place]. Prior to leaving [place] on the cruise, he starting to feel sick. He reports having fever, chills, and a productive cough on the first day of the cruise after being out in the rain. The cruise doctor treated him with Z pack/cough suppressant. Throughout the trip, his symptoms progressively worsened and began having diarrhea (10 times daily), frequent urination and shortness of breath. He was seen again by the doctor this morning and found to have PNA on CXR. Denies any sick contacts or anyone else on cruise getting sick. He is from [place], [place]. He was hospitalized for 10 days with COVID-19 early on in the pandemic with no prior history of intubation. He denies any history of smoking. He reports not drinking alcohol for more than three years.     Patient's BP was in the 50s/40s on arrival per EMS. He was given a 1L of fluid and satting in the low 90s on 5L NC. Due to his acidemia and RR in the 30s on arrival, he was put on BIPAP and weaned to comfort flow satting 95-96%.      Labs notable for WBC 23, Na 128, K 3.2, bicarb 16, anion gap 20, Cr 3.5, AST//89, Procal 26, VBG - ph 7.2, CO2 49, HCO3 20.5, lactic 5.78, point of care lactic 1.85. He was started on IV vanc/cefepime/azithromycin and admitted to MICU.       Hospital/ICU Course:  Admitted for pneumonia, legionella studies pending. On azithro, cefepime, vanc. O2 requirements improving. Will start BIPAP qhs for possible INNA/obesity hypoventilation syndrome.       Interval  History/Significant Events: Weaned O2 down to 10 L. Per outside records, baseline Cr 0.8. No diarrhea today    Review of Systems   Constitutional:  Positive for fatigue. Negative for chills and fever.   HENT:  Negative for congestion, rhinorrhea and sore throat.    Eyes:  Negative for photophobia and visual disturbance.   Respiratory:  Positive for cough, chest tightness and shortness of breath.    Cardiovascular:  Negative for chest pain, palpitations and leg swelling.   Gastrointestinal:  Positive for diarrhea. Negative for abdominal pain, blood in stool, nausea and vomiting.   Genitourinary:  Negative for dysuria, flank pain and hematuria.   Musculoskeletal:  Negative for back pain and neck pain.   Skin:  Negative for wound.   Neurological:  Negative for dizziness, seizures, syncope, weakness, numbness and headaches.     Objective:     Vital Signs (Most Recent):  Temp: 98.7 °F (37.1 °C) (09/11/23 1700)  Pulse: 105 (09/11/23 1703)  Resp: (!) 32 (09/11/23 1703)  BP: (!) 146/77 (09/11/23 1703)  SpO2: (!) 91 % (09/11/23 1703) Vital Signs (24h Range):  Temp:  [98.5 °F (36.9 °C)-98.7 °F (37.1 °C)] 98.7 °F (37.1 °C)  Pulse:  [] 105  Resp:  [20-33] 32  SpO2:  [90 %-95 %] 91 %  BP: (113-162)/(59-77) 146/77   Weight: 129 kg (284 lb 6.3 oz)  Body mass index is 42 kg/m².      Intake/Output Summary (Last 24 hours) at 9/11/2023 1814  Last data filed at 9/11/2023 1703  Gross per 24 hour   Intake 1073.72 ml   Output 3780 ml   Net -2706.28 ml          Physical Exam  Vitals and nursing note reviewed.   Constitutional:       Appearance: He is not diaphoretic.   HENT:      Head: Normocephalic and atraumatic.      Right Ear: External ear normal.      Left Ear: External ear normal.      Nose: Nose normal.      Comments: HF NC in place      Mouth/Throat:      Mouth: Mucous membranes are moist.   Eyes:      Extraocular Movements: Extraocular movements intact.      Conjunctiva/sclera: Conjunctivae normal.   Cardiovascular:       Rate and Rhythm: Normal rate and regular rhythm.      Pulses: Normal pulses.           Radial pulses are 2+ on the right side and 2+ on the left side.        Dorsalis pedis pulses are 2+ on the right side and 2+ on the left side.      Heart sounds: Normal heart sounds. No murmur heard.     No friction rub. No gallop.   Pulmonary:      Effort: No respiratory distress.      Breath sounds: Wheezing present.      Comments: Actively coughing throughout exam   Abdominal:      General: There is no distension.      Palpations: Abdomen is soft.      Tenderness: There is no abdominal tenderness. There is no guarding or rebound.   Musculoskeletal:         General: Normal range of motion.      Cervical back: Normal range of motion and neck supple.      Right lower leg: No edema.      Left lower leg: No edema.   Skin:     General: Skin is warm and dry.   Neurological:      Mental Status: He is alert.            Vents:  Oxygen Concentration (%): 67 (09/10/23 1359)  Lines/Drains/Airways       Drain  Duration                  Urethral Catheter 09/11/23 0400 16 Fr. <1 day              Peripheral Intravenous Line  Duration                  Peripheral IV - Single Lumen 09/10/23 0901 20 G Anterior;Left Forearm 1 day         Peripheral IV - Single Lumen 09/10/23 0904 20 G Left;Posterior Hand 1 day                  Significant Labs:    CBC/Anemia Profile:  Recent Labs   Lab 09/10/23  0901 09/10/23  0905 09/11/23  0313   WBC  --  23.51* 22.91*   HGB  --  11.7* 11.6*   HCT 37 35.0* 33.4*   PLT  --  187 219   MCV  --  94 90   RDW  --  14.0 14.3        Chemistries:  Recent Labs   Lab 09/10/23  0905 09/10/23  1843 09/11/23  0313   * 130* 131*   K 3.2* 3.2* 3.4*   CL 92* 93* 95   CO2 16* 21* 19*   BUN 66* 68* 68*   CREATININE 3.5* 3.3* 3.2*   CALCIUM 7.8* 7.9* 7.9*   ALBUMIN 2.0* 2.0* 1.9*   PROT 6.4 6.5 6.5   BILITOT 1.4* 0.9 0.7   ALKPHOS 121 118 118   ALT 89* 86* 77*   * 142* 126*   MG 2.7*  --  2.8*   PHOS  --   --  4.9*  "      Blood Culture:   Recent Labs   Lab 09/10/23  0904 09/10/23  0908   LABBLOO No Growth to date  No Growth to date No Growth to date  No Growth to date     Respiratory Culture:   Recent Labs   Lab 09/10/23  1828   GSRESP <10 epithelial cells per low power field.  Rare WBC's  Few Gram positive cocci   RESPIRATORYC Normal respiratory luther       Significant Imaging:  I have reviewed all pertinent imaging results/findings within the past 24 hours.      ABG  Recent Labs   Lab 09/10/23  0901   PH 7.224*   PO2 61*   PCO2 49.6*   HCO3 20.5*   BE -7     Assessment/Plan:     Pulmonary  Pneumonia  63-year-old man presenting directly from cruise ship with pneumonia treated with a Z-monique. He complains of shortness of breath, nausea, diarrhea, coughing, and fatigue.     CXR: "Patchy opacities involving the right greater left lung, concerning for infectious etiology.  Imaging follow-up to resolution in 4-6 weeks would be recommended."     · Vancomycin, cefepime, and azithromycin  · Procal 26.88  · Legionella testing  · Sputum culture  · BiPAP with goals to high flow nasal cannula           Acute respiratory failure with hypoxia and hypercarbia  Patient with Hypercapnic and Hypoxic Respiratory failure which is Acute.  he is not on home oxygen. Supplemental oxygen was provided and noted- Oxygen Concentration (%):  [67-80] 67    .   Signs/symptoms of respiratory failure include- tachypnea, increased work of breathing, respiratory distress and wheezing. Contributing diagnoses includes - Pneumonia Labs and images were reviewed. Patient Has not had a recent ABG. Recent VBG reviewed.    · Patient was weaned from BiPAP to high flow nasal cannula in ED, but he had increased work of breathing and was placed back on BiPAP.   · See "pneumonia" for treatment     Cardiac/Vascular  HTN (hypertension)  · Holding home amlodipine, losartan, and triamterene/HCTZ for acute infection  · Resume as clinically indicated    Renal/  High anion gap " metabolic acidosis  Labs on admission: , Cl 92, Bicard 16. Anion gap = 20. Likely secondary to dehydration and diarrhea.     · IVF fluids and improved oral intake     Hypokalemia  · Replete as needed    Hyponatremia  Patient presents with Na 128. . Predisposing medications: HCTZ    Diagnostics: Urine Na 18, Urine Osm 285, Serum Osm 288.    DDx: Likely secondary to Hypovolemia/Dehydration    · Trend Na; goal correction < 6-8 units / 24 hours  · TSH        DANNI (acute kidney injury)  Patient presenting with DANNI with admission sCr 3.5 (unknown baseline)    Serum creatinine: 3.5 mg/dL (H) 09/10/23 0905  Estimated creatinine clearance: 29.4 mL/min (A)    DDx: Likely due to pre-renal azotemia due to dehydration.    · Trend sCr  · Trend RFP; replete electrolytes PRN for goal K > 4, Phos > 3, Mg > 2  · Strict I&Os  · Avoid nephrotoxic agents  · Renally adjust medications      ID  * Severe sepsis  This patient does have evidence of infective focus  My overall impression is sepsis.  Source: Respiratory  Antibiotics given-   Antibiotics (72h ago, onward)    Start     Stop Route Frequency Ordered    09/11/23 0900  ceFEPIme (MAXIPIME) 1 g in dextrose 5 % in water (D5W) 100 mL IVPB (MB+)         -- IV Daily 09/10/23 1335    09/10/23 1426  vancomycin - pharmacy to dose  (vancomycin IVPB (PEDS and ADULTS))        See Hyperspace for full Linked Orders Report.    -- IV pharmacy to manage frequency 09/10/23 1326    09/10/23 1346  azithromycin (ZITHROMAX) 500 mg in dextrose 5 % (D5W) 250 mL IVPB (Vial-Mate)         -- IV Every 24 hours (non-standard times) 09/10/23 1346        Latest lactate reviewed-  Recent Labs   Lab 09/10/23  1440   LACTATE 1.7     Organ dysfunction indicated by Acute kidney injury and Acute liver injury    Fluid challenge Actual Body weight- Patient will receive 30ml/kg actual body weight to calculate fluid bolus for treatment of septic shock.     Will Not start Pressors- Levophed for MAP of 65  Source  "control achieved by: antibiotics    · Leukocytosis 23, lactic 5.88 on presentation- 1.85 on repeat, procal 26.88, DANNI, transaminitis  · For treatment of source see "pneumonia"      Endocrine  Severe obesity (BMI >= 40)  Body mass index is 42.83 kg/m². Morbid obesity complicates all aspects of disease management from diagnostic modalities to treatment. Weight loss encouraged and health benefits explained to patient.    GI  Transaminitis  63-year-old man presenting with severe sepsis likely secondary to pneumonia. Elevations likely due to infection/metabolic acidosis.     · Admission AST//89  · Trend CMP         Critical Care Daily Checklist:    A: Awake: RASS Goal/Actual Goal:    Actual:     B: Spontaneous Breathing Trial Performed?     C: SAT & SBT Coordinated?  NA                   D: Delirium: CAM-ICU Overall CAM-ICU: Negative   E: Early Mobility Performed? No   F: Feeding Goal:    Status:     Current Diet Order   Procedures    Diet Adult Regular (IDDSI Level 7)      AS: Analgesia/Sedation none   T: Thromboembolic Prophylaxis heparin   H: HOB > 300 Yes   U: Stress Ulcer Prophylaxis (if needed) NA   G: Glucose Control At goal   B: Bowel Function Stool Occurrence: 1   I: Indwelling Catheter (Lines & Maurice) Necessity PIV, maurice   D: De-escalation of Antimicrobials/Pharmacotherapies May consider stopping vanc tomorrow    Plan for the day/ETD Stepdown order placed, will be assigned in am    Code Status:  Family/Goals of Care: Full Code         Critical secondary to Patient has a condition that poses threat to life and bodily function: Severe Respiratory Distress      Critical care was time spent personally by me on the following activities: development of treatment plan with patient or surrogate and bedside caregivers, discussions with consultants, evaluation of patient's response to treatment, examination of patient, ordering and performing treatments and interventions, ordering and review of laboratory " studies, ordering and review of radiographic studies, pulse oximetry, re-evaluation of patient's condition. This critical care time did not overlap with that of any other provider or involve time for any procedures.     Jamison Tejada MD  Critical Care Medicine  [facility] - Cardiac Medical ICU

## 2023-09-11 NOTE — PROGRESS NOTES
Pharmacokinetic Assessment Follow Up: IV Vancomycin    Vancomycin Regimen Assessment & Plan  - Vancomycin level drawn with morning labs today resulted as 20.9 mcg/mL, goal trough 10-20 mcg/mL.  - Patient with DANNI. Will continue pulse dosing by levels.  - No need for vancomycin dose today given level. Draw vancomycin level with morning labs tomorrow. Will re-dose as needed depending on level and renal function.    Drug levels (last 3 results):  Recent Labs   Lab Result Units 09/11/23  0313   Vancomycin, Random ug/mL 20.9     Pharmacy will continue to follow and monitor vancomycin.    Please contact pharmacy at extension [phone] for questions regarding this assessment.    Thank you for the consult,   Az Mejias     Patient brief summary:  Umair Kenney is a 63 y.o. male initiated on antimicrobial therapy with IV Vancomycin for treatment of sepsis    Drug Allergies:   Review of patient's allergies indicates:  No Known Allergies    Actual Body Weight:   129 kg    Renal Function:   Estimated Creatinine Clearance: 31.8 mL/min (A) (based on SCr of 3.2 mg/dL (H)).,     Dialysis Method (if applicable):  N/A

## 2023-09-11 NOTE — PLAN OF CARE
MICU DAILY GOALS     Family/Goals of care/Code Status   Code Status: Full Code    24H Vital Sign Range  Temp:  [98.5 °F (36.9 °C)-98.8 °F (37.1 °C)]   Pulse:  []   Resp:  [17-34]   BP: ()/(52-70)   SpO2:  [90 %-98 %]      Shift Events   Admitted from the ED, came with BIPAP. Was able to switch to 12 LPM high flow nasa canula, satting well.    AWAKE RASS: Goal -    Actual -      Restraint necessity: Not necessary   BREATHE SBT: Not intubated    Coordinate A & B, analgesics/sedatives Pain: managed   SAT: Not intubated   Delirium CAM-ICU: Overall CAM-ICU: Negative   Early(intubated/ Progressive (non-intubated) Mobility MOVE Screen: Fail   Activity: Activity Management: Rolling - L1   Feeding/Nutrition Diet order: Diet/Nutrition Received: NPO,     Thrombus DVT prophylaxis: VTE Required Core Measure: Pharmacological prophylaxis initiated/maintained   HOB Elevation Head of Bed (HOB) Positioning: HOB at 30-45 degrees   Ulcer Prophylaxis GI: no   Glucose control managed Glycemic Management: blood glucose monitored   Skin Skin assessed during daily assessment.    Bowel Function no issues    Indwelling Catheter Necessity      Urethral Catheter-Reason for Continuing Urinary Catheterization: Urinary retention          De-escalation Antibiotics No       VS and assessment per flow sheet, patient progressing towards goals as tolerated, plan of care reviewed with  pt , all concerns addressed, will continue to monitor.

## 2023-09-11 NOTE — PLAN OF CARE
CM sent copy of patient's insurance card via email to Admitting.      Dinah Beck RN     [phone]

## 2023-09-11 NOTE — SUBJECTIVE & OBJECTIVE
Interval History/Significant Events: Weaned O2 down to 10 L. Per outside records, baseline Cr 0.8. No diarrhea today    Review of Systems   Constitutional:  Positive for fatigue. Negative for chills and fever.   HENT:  Negative for congestion, rhinorrhea and sore throat.    Eyes:  Negative for photophobia and visual disturbance.   Respiratory:  Positive for cough, chest tightness and shortness of breath.    Cardiovascular:  Negative for chest pain, palpitations and leg swelling.   Gastrointestinal:  Positive for diarrhea. Negative for abdominal pain, blood in stool, nausea and vomiting.   Genitourinary:  Negative for dysuria, flank pain and hematuria.   Musculoskeletal:  Negative for back pain and neck pain.   Skin:  Negative for wound.   Neurological:  Negative for dizziness, seizures, syncope, weakness, numbness and headaches.     Objective:     Vital Signs (Most Recent):  Temp: 98.7 °F (37.1 °C) (09/11/23 1700)  Pulse: 105 (09/11/23 1703)  Resp: (!) 32 (09/11/23 1703)  BP: (!) 146/77 (09/11/23 1703)  SpO2: (!) 91 % (09/11/23 1703) Vital Signs (24h Range):  Temp:  [98.5 °F (36.9 °C)-98.7 °F (37.1 °C)] 98.7 °F (37.1 °C)  Pulse:  [] 105  Resp:  [20-33] 32  SpO2:  [90 %-95 %] 91 %  BP: (113-162)/(59-77) 146/77   Weight: 129 kg (284 lb 6.3 oz)  Body mass index is 42 kg/m².      Intake/Output Summary (Last 24 hours) at 9/11/2023 1814  Last data filed at 9/11/2023 1703  Gross per 24 hour   Intake 1073.72 ml   Output 3780 ml   Net -2706.28 ml          Physical Exam  Vitals and nursing note reviewed.   Constitutional:       Appearance: He is not diaphoretic.   HENT:      Head: Normocephalic and atraumatic.      Right Ear: External ear normal.      Left Ear: External ear normal.      Nose: Nose normal.      Comments: HF NC in place      Mouth/Throat:      Mouth: Mucous membranes are moist.   Eyes:      Extraocular Movements: Extraocular movements intact.      Conjunctiva/sclera: Conjunctivae normal.   Cardiovascular:       Rate and Rhythm: Normal rate and regular rhythm.      Pulses: Normal pulses.           Radial pulses are 2+ on the right side and 2+ on the left side.        Dorsalis pedis pulses are 2+ on the right side and 2+ on the left side.      Heart sounds: Normal heart sounds. No murmur heard.     No friction rub. No gallop.   Pulmonary:      Effort: No respiratory distress.      Breath sounds: Wheezing present.      Comments: Actively coughing throughout exam   Abdominal:      General: There is no distension.      Palpations: Abdomen is soft.      Tenderness: There is no abdominal tenderness. There is no guarding or rebound.   Musculoskeletal:         General: Normal range of motion.      Cervical back: Normal range of motion and neck supple.      Right lower leg: No edema.      Left lower leg: No edema.   Skin:     General: Skin is warm and dry.   Neurological:      Mental Status: He is alert.            Vents:  Oxygen Concentration (%): 67 (09/10/23 1359)  Lines/Drains/Airways       Drain  Duration                  Urethral Catheter 09/11/23 0400 16 Fr. <1 day              Peripheral Intravenous Line  Duration                  Peripheral IV - Single Lumen 09/10/23 0901 20 G Anterior;Left Forearm 1 day         Peripheral IV - Single Lumen 09/10/23 0904 20 G Left;Posterior Hand 1 day                  Significant Labs:    CBC/Anemia Profile:  Recent Labs   Lab 09/10/23  0901 09/10/23  0905 09/11/23  0313   WBC  --  23.51* 22.91*   HGB  --  11.7* 11.6*   HCT 37 35.0* 33.4*   PLT  --  187 219   MCV  --  94 90   RDW  --  14.0 14.3        Chemistries:  Recent Labs   Lab 09/10/23  0905 09/10/23  1843 09/11/23  0313   * 130* 131*   K 3.2* 3.2* 3.4*   CL 92* 93* 95   CO2 16* 21* 19*   BUN 66* 68* 68*   CREATININE 3.5* 3.3* 3.2*   CALCIUM 7.8* 7.9* 7.9*   ALBUMIN 2.0* 2.0* 1.9*   PROT 6.4 6.5 6.5   BILITOT 1.4* 0.9 0.7   ALKPHOS 121 118 118   ALT 89* 86* 77*   * 142* 126*   MG 2.7*  --  2.8*   PHOS  --   --  4.9*        Blood Culture:   Recent Labs   Lab 09/10/23  0904 09/10/23  0908   LABBLOO No Growth to date  No Growth to date No Growth to date  No Growth to date     Respiratory Culture:   Recent Labs   Lab 09/10/23  1828   GSRESP <10 epithelial cells per low power field.  Rare WBC's  Few Gram positive cocci   RESPIRATORYC Normal respiratory luther       Significant Imaging:  I have reviewed all pertinent imaging results/findings within the past 24 hours.

## 2023-09-11 NOTE — PLAN OF CARE
MICU DAILY GOALS     Family/Goals of care/Code Status   Code Status: Full Code    24H Vital Sign Range  Temp:  [98.5 °F (36.9 °C)-98.7 °F (37.1 °C)]   Pulse:  []   Resp:  [20-33]   BP: (113-162)/(59-73)   SpO2:  [90 %-95 %]      Shift Events   No acute events throughout shift; Patient awake/alert/orientedx4. VSS. On 10-12 Liters/o2 with saturations >91%. Wife updated by team at bedside. Ambulated to bedside chair today. PIVx2. Hamilton, Poss. Stepdown tomorrow.     AWAKE RASS: Goal -    Actual -      Restraint necessity: Not necessary   BREATHE SBT: Not intubated    Coordinate A & B, analgesics/sedatives Pain: managed   SAT: Not intubated   Delirium CAM-ICU: Overall CAM-ICU: Negative   Early(intubated/ Progressive (non-intubated) Mobility MOVE Screen: Pass   Activity: Activity Management: Ambulated in room - L4   Feeding/Nutrition Diet order: Diet/Nutrition Received: regular,     Thrombus DVT prophylaxis: VTE Required Core Measure: Pharmacological prophylaxis initiated/maintained   HOB Elevation Head of Bed (HOB) Positioning: HOB at 30-45 degrees   Ulcer Prophylaxis GI: yes   Glucose control managed Glycemic Management: blood glucose monitored   Skin Skin assessed during daily assessment. Skin/C/D/I.   Bowel Function diarrhea    Indwelling Catheter Necessity      Urethral Catheter 09/11/23 0400 16 Fr.-Reason for Continuing Urinary Catheterization: Critically ill in ICU and requiring hourly monitoring of intake/output  [REMOVED]      Urethral Catheter-Reason for Continuing Urinary Catheterization: Urinary retention       Acute Kidney injury; Adequate urine output. Possible remove tomorrow c. Stepdown.    De-escalation Antibiotics No       VS and assessment per flow sheet, patient progressing towards goals as tolerated, plan of care reviewed with family, all concerns addressed, will continue to monitor.

## 2023-09-11 NOTE — HOSPITAL COURSE
Patient admitted to MICU for increased work of breathing secondary to pneumonia. His legionella studies are pending. Initially treated with azithromycin, cefepime, and vancomycin and narrowed to ceftriaxone and azithromycin on 9/12/23. O2 requirements improving. Patient is not tolerating BiPAP overnight. Since admission, he has had worsening LFTs with workup including liver ultrasound and hepatitis panel.

## 2023-09-12 LAB
ALBUMIN SERPL BCP-MCNC: 1.8 G/DL (ref 3.5–5.2)
ALLENS TEST: ABNORMAL
ALP SERPL-CCNC: 159 U/L (ref 55–135)
ALT SERPL W/O P-5'-P-CCNC: 154 U/L (ref 10–44)
ANION GAP SERPL CALC-SCNC: 14 MMOL/L (ref 8–16)
ANISOCYTOSIS BLD QL SMEAR: SLIGHT
AST SERPL-CCNC: 247 U/L (ref 10–40)
AV INDEX (PROSTH): 0.81
AV MEAN GRADIENT: 7 MMHG
AV PEAK GRADIENT: 13 MMHG
AV VALVE AREA BY VELOCITY RATIO: 3.54 CM²
AV VALVE AREA: 3.65 CM²
AV VELOCITY RATIO: 0.78
BACTERIA SPEC AEROBE CULT: NORMAL
BACTERIA SPEC AEROBE CULT: NORMAL
BASOPHILS # BLD AUTO: ABNORMAL K/UL (ref 0–0.2)
BASOPHILS NFR BLD: 0 % (ref 0–1.9)
BILIRUB SERPL-MCNC: 0.8 MG/DL (ref 0.1–1)
BSA FOR ECHO PROCEDURE: 2.5 M2
BUN SERPL-MCNC: 57 MG/DL (ref 8–23)
BURR CELLS BLD QL SMEAR: ABNORMAL
CALCIUM SERPL-MCNC: 8.1 MG/DL (ref 8.7–10.5)
CHLORIDE SERPL-SCNC: 99 MMOL/L (ref 95–110)
CO2 SERPL-SCNC: 22 MMOL/L (ref 23–29)
CREAT SERPL-MCNC: 2.3 MG/DL (ref 0.5–1.4)
CV ECHO LV RWT: 0.41 CM
DELSYS: ABNORMAL
DIFFERENTIAL METHOD: ABNORMAL
DOP CALC AO PEAK VEL: 1.79 M/S
DOP CALC AO VTI: 29 CM
DOP CALC LVOT AREA: 4.5 CM2
DOP CALC LVOT DIAMETER: 2.4 CM
DOP CALC LVOT PEAK VEL: 1.4 M/S
DOP CALC LVOT STROKE VOLUME: 105.76 CM3
DOP CALCLVOT PEAK VEL VTI: 23.39 CM
E WAVE DECELERATION TIME: 213.09 MSEC
E/A RATIO: 1.23
E/E' RATIO: 5.48 M/S
ECHO LV POSTERIOR WALL: 1.05 CM (ref 0.6–1.1)
EOSINOPHIL # BLD AUTO: ABNORMAL K/UL (ref 0–0.5)
EOSINOPHIL NFR BLD: 1 % (ref 0–8)
ERYTHROCYTE [DISTWIDTH] IN BLOOD BY AUTOMATED COUNT: 14.7 % (ref 11.5–14.5)
EST. GFR  (NO RACE VARIABLE): 31.3 ML/MIN/1.73 M^2
FRACTIONAL SHORTENING: 42 % (ref 28–44)
GIANT PLATELETS BLD QL SMEAR: PRESENT
GLUCOSE SERPL-MCNC: 128 MG/DL (ref 70–110)
GRAM STN SPEC: NORMAL
HAV IGM SERPL QL IA: NORMAL
HBV CORE IGM SERPL QL IA: NORMAL
HBV SURFACE AG SERPL QL IA: NORMAL
HCO3 UR-SCNC: 35.7 MMOL/L (ref 24–28)
HCT VFR BLD AUTO: 34.1 % (ref 40–54)
HCT VFR BLD CALC: 35 %PCV (ref 36–54)
HCV AB SERPL QL IA: NORMAL
HGB BLD-MCNC: 11.8 G/DL (ref 14–18)
HYPOCHROMIA BLD QL SMEAR: ABNORMAL
IMM GRANULOCYTES # BLD AUTO: ABNORMAL K/UL (ref 0–0.04)
IMM GRANULOCYTES NFR BLD AUTO: ABNORMAL % (ref 0–0.5)
INTERVENTRICULAR SEPTUM: 1.02 CM (ref 0.6–1.1)
LA MAJOR: 4.95 CM
LA MINOR: 5.3 CM
LA WIDTH: 3.85 CM
LEFT ATRIUM SIZE: 4.15 CM
LEFT ATRIUM VOLUME INDEX MOD: 14.5 ML/M2
LEFT ATRIUM VOLUME INDEX: 29 ML/M2
LEFT ATRIUM VOLUME MOD: 34.83 CM3
LEFT ATRIUM VOLUME: 69.52 CM3
LEFT INTERNAL DIMENSION IN SYSTOLE: 2.99 CM (ref 2.1–4)
LEFT VENTRICLE DIASTOLIC VOLUME INDEX: 52.22 ML/M2
LEFT VENTRICLE DIASTOLIC VOLUME: 125.33 ML
LEFT VENTRICLE MASS INDEX: 83 G/M2
LEFT VENTRICLE SYSTOLIC VOLUME INDEX: 14.4 ML/M2
LEFT VENTRICLE SYSTOLIC VOLUME: 34.62 ML
LEFT VENTRICULAR INTERNAL DIMENSION IN DIASTOLE: 5.13 CM (ref 3.5–6)
LEFT VENTRICULAR MASS: 198.82 G
LV LATERAL E/E' RATIO: 4.93 M/S
LV SEPTAL E/E' RATIO: 6.17 M/S
LYMPHOCYTES # BLD AUTO: ABNORMAL K/UL (ref 1–4.8)
LYMPHOCYTES NFR BLD: 9 % (ref 18–48)
MAGNESIUM SERPL-MCNC: 2.9 MG/DL (ref 1.6–2.6)
MCH RBC QN AUTO: 31.3 PG (ref 27–31)
MCHC RBC AUTO-ENTMCNC: 34.6 G/DL (ref 32–36)
MCV RBC AUTO: 91 FL (ref 82–98)
METAMYELOCYTES NFR BLD MANUAL: 3 %
MONOCYTES # BLD AUTO: ABNORMAL K/UL (ref 0.3–1)
MONOCYTES NFR BLD: 4 % (ref 4–15)
MV PEAK A VEL: 0.6 M/S
MV PEAK E VEL: 0.74 M/S
MV STENOSIS PRESSURE HALF TIME: 61.8 MS
MV VALVE AREA P 1/2 METHOD: 3.56 CM2
MYELOCYTES NFR BLD MANUAL: 6 %
NEUTROPHILS NFR BLD: 71 % (ref 38–73)
NEUTS BAND NFR BLD MANUAL: 5 %
NRBC BLD-RTO: 0 /100 WBC
OVALOCYTES BLD QL SMEAR: ABNORMAL
PCO2 BLDA: 53.8 MMHG (ref 35–45)
PH SMN: 7.43 [PH] (ref 7.35–7.45)
PHOSPHATE SERPL-MCNC: 2.5 MG/DL (ref 2.7–4.5)
PISA TR MAX VEL: 2.57 M/S
PLATELET # BLD AUTO: 296 K/UL (ref 150–450)
PLATELET BLD QL SMEAR: ABNORMAL
PMV BLD AUTO: 10.9 FL (ref 9.2–12.9)
PO2 BLDA: 61 MMHG (ref 80–100)
POC BE: 11 MMOL/L
POC SATURATED O2: 91 % (ref 95–100)
POC TCO2: 37 MMOL/L (ref 23–27)
POCT GLUCOSE: 117 MG/DL (ref 70–110)
POCT GLUCOSE: 120 MG/DL (ref 70–110)
POCT GLUCOSE: 122 MG/DL (ref 70–110)
POCT GLUCOSE: 128 MG/DL (ref 70–110)
POCT GLUCOSE: 170 MG/DL (ref 70–110)
POIKILOCYTOSIS BLD QL SMEAR: SLIGHT
POLYCHROMASIA BLD QL SMEAR: ABNORMAL
POTASSIUM SERPL-SCNC: 3.9 MMOL/L (ref 3.5–5.1)
PROMYELOCYTES NFR BLD MANUAL: 1 %
PROT SERPL-MCNC: 6.8 G/DL (ref 6–8.4)
RA MAJOR: 4.46 CM
RA PRESSURE ESTIMATED: 8 MMHG
RA WIDTH: 2.88 CM
RBC # BLD AUTO: 3.77 M/UL (ref 4.6–6.2)
RIGHT VENTRICULAR END-DIASTOLIC DIMENSION: 4.58 CM
RV TB RVSP: 11 MMHG
RV TISSUE DOPPLER FREE WALL SYSTOLIC VELOCITY 1 (APICAL 4 CHAMBER VIEW): 14.84 CM/S
SAMPLE: ABNORMAL
SINUS: 4.4 CM
SITE: ABNORMAL
SODIUM SERPL-SCNC: 135 MMOL/L (ref 136–145)
STJ: 3.93 CM
TARGETS BLD QL SMEAR: ABNORMAL
TDI LATERAL: 0.15 M/S
TDI SEPTAL: 0.12 M/S
TDI: 0.14 M/S
TOXIC GRANULES BLD QL SMEAR: PRESENT
TR MAX PG: 26 MMHG
TRICUSPID ANNULAR PLANE SYSTOLIC EXCURSION: 1.94 CM
TV REST PULMONARY ARTERY PRESSURE: 34 MMHG
VANCOMYCIN SERPL-MCNC: 8.7 UG/ML
WBC # BLD AUTO: 20.03 K/UL (ref 3.9–12.7)
Z-SCORE OF LEFT VENTRICULAR DIMENSION IN END DIASTOLE: -7.57
Z-SCORE OF LEFT VENTRICULAR DIMENSION IN END SYSTOLE: -6.2

## 2023-09-12 PROCEDURE — 25000003 PHARM REV CODE 250: Performed by: INTERNAL MEDICINE

## 2023-09-12 PROCEDURE — 63600175 PHARM REV CODE 636 W HCPCS

## 2023-09-12 PROCEDURE — 25000242 PHARM REV CODE 250 ALT 637 W/ HCPCS

## 2023-09-12 PROCEDURE — 27100171 HC OXYGEN HIGH FLOW UP TO 24 HOURS

## 2023-09-12 PROCEDURE — 20600001 HC STEP DOWN PRIVATE ROOM

## 2023-09-12 PROCEDURE — 97165 OT EVAL LOW COMPLEX 30 MIN: CPT

## 2023-09-12 PROCEDURE — 85027 COMPLETE CBC AUTOMATED: CPT

## 2023-09-12 PROCEDURE — 80053 COMPREHEN METABOLIC PANEL: CPT

## 2023-09-12 PROCEDURE — 63600175 PHARM REV CODE 636 W HCPCS: Performed by: HOSPITALIST

## 2023-09-12 PROCEDURE — 25000003 PHARM REV CODE 250: Performed by: HOSPITALIST

## 2023-09-12 PROCEDURE — 99233 PR SUBSEQUENT HOSPITAL CARE,LEVL III: ICD-10-PCS | Mod: ,,, | Performed by: INTERNAL MEDICINE

## 2023-09-12 PROCEDURE — 84100 ASSAY OF PHOSPHORUS: CPT

## 2023-09-12 PROCEDURE — 94761 N-INVAS EAR/PLS OXIMETRY MLT: CPT

## 2023-09-12 PROCEDURE — 25000003 PHARM REV CODE 250

## 2023-09-12 PROCEDURE — 94640 AIRWAY INHALATION TREATMENT: CPT

## 2023-09-12 PROCEDURE — 80202 ASSAY OF VANCOMYCIN: CPT | Performed by: INTERNAL MEDICINE

## 2023-09-12 PROCEDURE — 27000221 HC OXYGEN, UP TO 24 HOURS

## 2023-09-12 PROCEDURE — 99233 SBSQ HOSP IP/OBS HIGH 50: CPT | Mod: ,,, | Performed by: INTERNAL MEDICINE

## 2023-09-12 PROCEDURE — 83735 ASSAY OF MAGNESIUM: CPT

## 2023-09-12 PROCEDURE — 80074 ACUTE HEPATITIS PANEL: CPT

## 2023-09-12 PROCEDURE — 99900035 HC TECH TIME PER 15 MIN (STAT)

## 2023-09-12 PROCEDURE — 36600 WITHDRAWAL OF ARTERIAL BLOOD: CPT

## 2023-09-12 PROCEDURE — 85007 BL SMEAR W/DIFF WBC COUNT: CPT

## 2023-09-12 PROCEDURE — 27200966 HC CLOSED SUCTION SYSTEM

## 2023-09-12 PROCEDURE — 82803 BLOOD GASES ANY COMBINATION: CPT

## 2023-09-12 PROCEDURE — 63600175 PHARM REV CODE 636 W HCPCS: Performed by: INTERNAL MEDICINE

## 2023-09-12 PROCEDURE — 97161 PT EVAL LOW COMPLEX 20 MIN: CPT

## 2023-09-12 PROCEDURE — 25000003 PHARM REV CODE 250: Performed by: STUDENT IN AN ORGANIZED HEALTH CARE EDUCATION/TRAINING PROGRAM

## 2023-09-12 RX ORDER — TALC
6 POWDER (GRAM) TOPICAL NIGHTLY PRN
Status: DISCONTINUED | OUTPATIENT
Start: 2023-09-12 | End: 2023-09-20 | Stop reason: HOSPADM

## 2023-09-12 RX ORDER — SODIUM CHLORIDE 0.9 % (FLUSH) 0.9 %
10 SYRINGE (ML) INJECTION
Status: DISCONTINUED | OUTPATIENT
Start: 2023-09-12 | End: 2023-09-20 | Stop reason: HOSPADM

## 2023-09-12 RX ADMIN — IPRATROPIUM BROMIDE 0.5 MG: 0.5 SOLUTION RESPIRATORY (INHALATION) at 08:09

## 2023-09-12 RX ADMIN — LEVALBUTEROL: 1.25 SOLUTION, CONCENTRATE RESPIRATORY (INHALATION) at 08:09

## 2023-09-12 RX ADMIN — VANCOMYCIN HYDROCHLORIDE 1000 MG: 1 INJECTION, POWDER, LYOPHILIZED, FOR SOLUTION INTRAVENOUS at 09:09

## 2023-09-12 RX ADMIN — HEPARIN SODIUM 7500 UNITS: 5000 INJECTION INTRAVENOUS; SUBCUTANEOUS at 02:09

## 2023-09-12 RX ADMIN — IPRATROPIUM BROMIDE 0.5 MG: 0.5 SOLUTION RESPIRATORY (INHALATION) at 04:09

## 2023-09-12 RX ADMIN — HEPARIN SODIUM 7500 UNITS: 5000 INJECTION INTRAVENOUS; SUBCUTANEOUS at 09:09

## 2023-09-12 RX ADMIN — LEVALBUTEROL 1.25 MG: 1.25 SOLUTION, CONCENTRATE RESPIRATORY (INHALATION) at 08:09

## 2023-09-12 RX ADMIN — LEVALBUTEROL 1.25 MG: 1.25 SOLUTION, CONCENTRATE RESPIRATORY (INHALATION) at 11:09

## 2023-09-12 RX ADMIN — AZITHROMYCIN MONOHYDRATE 500 MG: 500 INJECTION, POWDER, LYOPHILIZED, FOR SOLUTION INTRAVENOUS at 02:09

## 2023-09-12 RX ADMIN — LEVALBUTEROL 1.25 MG: 1.25 SOLUTION, CONCENTRATE RESPIRATORY (INHALATION) at 04:09

## 2023-09-12 RX ADMIN — MUPIROCIN: 20 OINTMENT TOPICAL at 08:09

## 2023-09-12 RX ADMIN — CEFEPIME 1 G: 1 INJECTION, POWDER, FOR SOLUTION INTRAMUSCULAR; INTRAVENOUS at 08:09

## 2023-09-12 RX ADMIN — MUPIROCIN: 20 OINTMENT TOPICAL at 09:09

## 2023-09-12 RX ADMIN — HEPARIN SODIUM 7500 UNITS: 5000 INJECTION INTRAVENOUS; SUBCUTANEOUS at 05:09

## 2023-09-12 RX ADMIN — IPRATROPIUM BROMIDE 0.5 MG: 0.5 SOLUTION RESPIRATORY (INHALATION) at 12:09

## 2023-09-12 RX ADMIN — IPRATROPIUM BROMIDE 0.5 MG: 0.5 SOLUTION RESPIRATORY (INHALATION) at 11:09

## 2023-09-12 RX ADMIN — LEVALBUTEROL 1.25 MG: 1.25 SOLUTION, CONCENTRATE RESPIRATORY (INHALATION) at 12:09

## 2023-09-12 RX ADMIN — SODIUM PHOSPHATE, MONOBASIC, MONOHYDRATE AND SODIUM PHOSPHATE, DIBASIC, ANHYDROUS 20.01 MMOL: 142; 276 INJECTION, SOLUTION INTRAVENOUS at 05:09

## 2023-09-12 RX ADMIN — CEFTRIAXONE 2 G: 2 INJECTION, POWDER, FOR SOLUTION INTRAMUSCULAR; INTRAVENOUS at 09:09

## 2023-09-12 NOTE — NURSING
Expressed concerns about patient stepping down due to respiratory status. MICU RN reported patient only wore bipap for 3hrs last night. Patient is on 8L nasal cannula with humidification with respiratory rate between 20s-30s, and oxygen saturation at 91-92%. Spoke with charge nurse at MICU, and rapid team's to address concerns. They stated appropriate for transfer. Will continue to monitor.   Marlys MCGOWAN RN, Charge 14W

## 2023-09-12 NOTE — SUBJECTIVE & OBJECTIVE
Interval History/Significant Events: No acute events overnight. He did not tolerate BiPAP well and says he did not sleep well. Patient has been weaned to 10 L oxygen via nasal cannula and reports improved diarrhea. No RUQ pain. His sCr improved and he had >4 L of urine output over 24 hours.     Review of Systems   Constitutional:  Positive for fatigue. Negative for chills and fever.   HENT:  Negative for congestion, rhinorrhea and sore throat.    Respiratory:  Positive for cough, chest tightness and shortness of breath.    Cardiovascular:  Negative for chest pain, palpitations and leg swelling.   Gastrointestinal:  Positive for diarrhea. Negative for abdominal pain, blood in stool, nausea and vomiting.   Genitourinary:  Negative for dysuria, flank pain and hematuria.   Musculoskeletal:  Negative for back pain and neck pain.   Skin:  Negative for wound.   Neurological:  Negative for dizziness, weakness and headaches.     Objective:     Vital Signs (Most Recent):  Temp: 99.4 °F (37.4 °C) (09/12/23 0900)  Pulse: 84 (09/12/23 1205)  Resp: (!) 22 (09/12/23 1205)  BP: 135/72 (09/12/23 1200)  SpO2: 96 % (09/12/23 1205) Vital Signs (24h Range):  Temp:  [98.6 °F (37 °C)-99.6 °F (37.6 °C)] 99.4 °F (37.4 °C)  Pulse:  [] 84  Resp:  [18-34] 22  SpO2:  [89 %-97 %] 96 %  BP: (107-158)/(62-77) 135/72   Weight: 128.8 kg (284 lb)  Body mass index is 41.94 kg/m².      Intake/Output Summary (Last 24 hours) at 9/12/2023 1317  Last data filed at 9/12/2023 1237  Gross per 24 hour   Intake 1424.91 ml   Output 4295 ml   Net -2870.09 ml          Physical Exam  Vitals and nursing note reviewed.   Constitutional:       Appearance: He is obese. He is not diaphoretic.   HENT:      Head: Normocephalic and atraumatic.      Nose: Nose normal.      Comments: NC in place      Mouth/Throat:      Mouth: Mucous membranes are moist.   Eyes:      Conjunctiva/sclera: Conjunctivae normal.   Cardiovascular:      Rate and Rhythm: Normal rate and regular  rhythm.      Pulses: Normal pulses.      Heart sounds: Normal heart sounds. No murmur heard.     No gallop.   Pulmonary:      Effort: No respiratory distress.      Breath sounds: Wheezing present.   Abdominal:      General: There is no distension.      Palpations: Abdomen is soft.      Tenderness: There is no abdominal tenderness. There is no guarding or rebound.   Musculoskeletal:      Right lower leg: No edema.      Left lower leg: No edema.   Skin:     General: Skin is warm and dry.   Neurological:      Mental Status: He is alert.            Vents:  Oxygen Concentration (%): 67 (09/10/23 1359)  Lines/Drains/Airways       Drain  Duration                  Urethral Catheter 09/11/23 0400 16 Fr. 1 day              Peripheral Intravenous Line  Duration                  Peripheral IV - Single Lumen 09/10/23 0901 20 G Anterior;Left Forearm 2 days         Peripheral IV - Single Lumen 09/10/23 0904 20 G Left;Posterior Hand 2 days                  Significant Labs:    CBC/Anemia Profile:  Recent Labs   Lab 09/11/23 0313 09/12/23  0140   WBC 22.91* 20.03*   HGB 11.6* 11.8*   HCT 33.4* 34.1*    296   MCV 90 91   RDW 14.3 14.7*        Chemistries:  Recent Labs   Lab 09/10/23  1843 09/11/23  0313 09/12/23  0140   * 131* 135*   K 3.2* 3.4* 3.9   CL 93* 95 99   CO2 21* 19* 22*   BUN 68* 68* 57*   CREATININE 3.3* 3.2* 2.3*   CALCIUM 7.9* 7.9* 8.1*   ALBUMIN 2.0* 1.9* 1.8*   PROT 6.5 6.5 6.8   BILITOT 0.9 0.7 0.8   ALKPHOS 118 118 159*   ALT 86* 77* 154*   * 126* 247*   MG  --  2.8* 2.9*   PHOS  --  4.9* 2.5*       CMP:   Recent Labs   Lab 09/10/23  1843 09/11/23  0313 09/12/23  0140   * 131* 135*   K 3.2* 3.4* 3.9   CL 93* 95 99   CO2 21* 19* 22*   * 107 128*   BUN 68* 68* 57*   CREATININE 3.3* 3.2* 2.3*   CALCIUM 7.9* 7.9* 8.1*   PROT 6.5 6.5 6.8   ALBUMIN 2.0* 1.9* 1.8*   BILITOT 0.9 0.7 0.8   ALKPHOS 118 118 159*   * 126* 247*   ALT 86* 77* 154*   ANIONGAP 16 17* 14       Significant  Imaging:  I have reviewed all pertinent imaging results/findings within the past 24 hours.    US LIVER WITH DOPPLER     CLINICAL HISTORY:   Transaminitis;     TECHNIQUE:   Ultrasound of the liver (including pancreas, liver, gallbladder, common bile duct, spleen, IVC) with color and spectral evaluation was performed.     COMPARISON:   CT chest 09/10/2023.     FINDINGS:   Liver: Enlarged, measuring 20.0 cm. Diffusely increased parenchymal echogenicity consistent with steatosis.  The hepatorenal index is 1.6.  No focal hepatic lesions.     Gallbladder: The gallbladder is surgically absent.     Biliary system: The common duct is not dilated, measuring 2 mm.  No intrahepatic ductal dilatation.     Spleen: Enlarged measuring 12.2 x 4.4 cm, with a homogeneous echotexture.     Pancreas: Partially obscured by overlying bowel gas.  The visualized portions of pancreas appear normal.     Inferior vena cava: Normal in appearance.     Miscellaneous: No ascites.     Main hepatic artery: Patent.     Main portal vein: Patent with proper directional flow.  9 mm caliber, within normal limits.     Left portal vein:  Patent with proper directional flow.     Right portal vein:  Patent with proper directional flow.     Splenic vein: Patent with proper directional flow.     SMV:  Patent with proper directional flow.     IVC: Patent     Left, middle, and right hepatic veins: Patent.     Umbilical vein: Not patent.     Collaterals: None.    Impression:       Patent portal and hepatic vasculature.     Hepatomegaly with steatosis.     Splenomegaly.     Cholecystectomy.        Results for orders placed during the hospital encounter of 09/10/23    Echo    Interpretation Summary    Left Ventricle: The left ventricle is normal in size. Normal wall thickness. Normal wall motion. There is normal systolic function with a visually estimated ejection fraction of 55 - 60%. There is normal diastolic function.    Right Ventricle: Mild right ventricular  enlargement. Systolic function is mildly reduced.    Pulmonary Artery: The estimated pulmonary artery systolic pressure is 34 mmHg.    IVC/SVC: Intermediate venous pressure at 8 mmHg.    Aorta: Aortic root is mildly dilated measuring 4.4 cm. Ascending aorta is normal.

## 2023-09-12 NOTE — ASSESSMENT & PLAN NOTE
Patient presents with Na 128. Predisposing medications: HCTZ    Diagnostics: Urine Na 18, Urine Osm 285, Serum Osm 288 on admission    DDx: Likely secondary to Hypovolemia/Dehydration    · Improving, 135 on 9/12  · Trend Na

## 2023-09-12 NOTE — ASSESSMENT & PLAN NOTE
Patient presenting with DANNI with admission sCr 3.5 (baseline 0.8)    Serum creatinine: 2.3 mg/dL (H) 09/12/23 0140  Estimated creatinine clearance: 44.2 mL/min (A)    DDx: Likely due to pre-renal azotemia vs acute tubular necrosis due to dehydration/infection. Large UOP at 4120 mL over 24 hours, possibly diuresis phase of ATN.     · Trend sCr  · Trend RFP; replete electrolytes PRN for goal K > 4, Phos > 3, Mg > 2  · Strict I&Os  · Avoid nephrotoxic agents  · Renally adjust medications

## 2023-09-12 NOTE — ASSESSMENT & PLAN NOTE
63-year-old man presenting with severe sepsis likely secondary to pneumonia. Elevations likely due to infection/metabolic acidosis. Due to his increasing LFTs, liver ultrasound with doppler and hepatitis panel were ordered to rule out additional etiologies.     Liver Ultrasound 9/12/23: Patent portal and hepatic vasculature. Hepatomegaly with steatosis. Splenomegaly. Cholecystectomy.     · Admission AST//89, with elevation to 247/154  · Trend CMP  · Hepatitis panel due to travel history, non reactive

## 2023-09-12 NOTE — PLAN OF CARE
[facility] - Cardiac Medical ICU  Initial Discharge Assessment       Primary Care Provider: No, Primary Doctor    Admission Diagnosis: Lobar pneumonia [J18.1]  Hypoxia [R09.02]  Volume overload [E87.70]  DANNI (acute kidney injury) [N17.9]  Chest pain [R07.9]  Sepsis, due to unspecified organism, unspecified whether acute organ dysfunction present [A41.9]    Admission Date: 9/10/2023  Expected Discharge Date: 9/15/2023    Transition of Care Barriers: Underinsured, Other (see comments) (Patient has [place] Medicaid)    Payor: /     Emergency Contact: Neeru Luciano (spouse) [phone]    Discharge Plan A: Home with family  Discharge Plan B: Home with family    No Pharmacies Listed      Transferred from:     Past Medical History:   Diagnosis Date    Hypertension          CM met with patient in room for Discharge Planning Assessment.  Patient was able to answer questions.  Per patient, he lives with Neeru Luciano (spouse) [phone] in a [facility] apartment with 16 steps to upstairs bedroom and 3 step(s) to enter home.   Per patient, he was independent with ADLS and used no DME for ambulation. Per patient, he is not on dialysis and does not take Coumadin. Patient advised that he and spouse are from [place] and were down in [place] to board [facility] when he became ill. Patient does not have a PCP back home but goes to a clinic in [place]. Patient could not remember pharmacy used back home. CM sent email to Admitting with copy of patient's insurance card.  Patient will have help from Neeru Luciano (spouse) [phone] upon discharge.   Discharge Planning Booklet given to patient/family and discussed.  All questions addressed.  CM will follow for needs.      Initial Assessment (most recent)       Adult Discharge Assessment - 09/11/23 1634          Discharge Assessment    Assessment Type Discharge Planning Assessment     Confirmed/corrected address, phone number and insurance Yes     Confirmed Demographics  Correct on Facesheet     Source of Information patient     When was your last doctors appointment? --   Year 2020 unknown exact date.    Communicated CB with patient/caregiver Date not available/Unable to determine     Reason For Admission Severe sepsis     People in Home spouse     Facility Arrived From: [facility]     Do you expect to return to your current living situation? Yes     Do you have help at home or someone to help you manage your care at home? Yes     Who are your caregiver(s) and their phone number(s)? Neeru Luciano (spouse) [phone]     Prior to hospitilization cognitive status: Alert/Oriented     Current cognitive status: Alert/Oriented     Home Layout Bedroom on 2nd floor     Equipment Currently Used at Home none     Readmission within 30 days? No     Patient currently being followed by outpatient case management? No     Do you currently have service(s) that help you manage your care at home? No     Do you take prescription medications? Yes     Do you have prescription coverage? Yes     Coverage [facility]     Do you have any problems affording any of your prescribed medications? TBD     Is the patient taking medications as prescribed? yes     Who is going to help you get home at discharge? Neeru Luciano (spouse) [phone]     How do you get to doctors appointments? car, drives self     Are you on dialysis? No     Do you take coumadin? No     DME Needed Upon Discharge  other (see comments)   TBD    Discharge Plan discussed with: Patient     Transition of Care Barriers Underinsured;Other (see comments)   Patient has [place] Medicaid    Discharge Plan A Home with family     Discharge Plan B Home with family        Physical Activity    On average, how many days per week do you engage in moderate to strenuous exercise (like a brisk walk)? 0 days     On average, how many minutes do you engage in exercise at this level? 0 min        Financial Resource Strain    How hard is it  for you to pay for the very basics like food, housing, medical care, and heating? Not very hard        Housing Stability    In the last 12 months, was there a time when you were not able to pay the mortgage or rent on time? No     In the last 12 months, how many places have you lived? 1     In the last 12 months, was there a time when you did not have a steady place to sleep or slept in a shelter (including now)? No        Transportation Needs    In the past 12 months, has lack of transportation kept you from medical appointments or from getting medications? No     In the past 12 months, has lack of transportation kept you from meetings, work, or from getting things needed for daily living? No        Food Insecurity    Within the past 12 months, you worried that your food would run out before you got the money to buy more. Never true     Within the past 12 months, the food you bought just didn't last and you didn't have money to get more. Never true        Stress    Do you feel stress - tense, restless, nervous, or anxious, or unable to sleep at night because your mind is troubled all the time - these days? To some extent        Social Connections    In a typical week, how many times do you talk on the phone with family, friends, or neighbors? More than three times a week     How often do you get together with friends or relatives? More than three times a week     How often do you attend Islam or Mormon services? More than 4 times per year     Do you belong to any clubs or organizations such as Islam groups, unions, fraternal or athletic groups, or school groups? No     How often do you attend meetings of the clubs or organizations you belong to? Never     Are you , , , , never , or living with a partner?         Alcohol Use    Q1: How often do you have a drink containing alcohol? Never     Q2: How many drinks containing alcohol do you have on a typical day when you  are drinking? Patient does not drink     Q3: How often do you have six or more drinks on one occasion? Never        OTHER    Name(s) of People in Home Neeru Luciano (spouse) [phone]                          PCP:  No, Primary Doctor  None        Pharmacy:  No Pharmacies Listed      Emergency Contacts:  Neeru Luciano (spouse) [phone]      Insurance: [employer]    Payor: /     Dinah Beck RN     [phone]      09/12/2023  10:03 AM

## 2023-09-12 NOTE — NURSING TRANSFER
Nursing Transfer Note      9/12/2023   6:35 PM    Nurse giving handoff:Rosanna RN   Nurse receiving handoff:Elizabeth, 14WT    Reason patient is being transferred: Stepdown from ICU; Higher level care of not required.     Transfer To: 6WT ([id number])    Transfer via wheelchair, Oxygen tank, Tele monitor;     Transfer with HiFlow Humidified Cannula 8L  to O2, cardiac monitoring; BiPAP transferred and left in [id number];     Transported by Rosanna RN     Transfer Vital Signs:  Blood Pressure:147/79  Heart Rate:98  O2:93 on 8L.   Temperature:99.2 AX  Respirations:32    Telemetry: Rhythm Nsr/ST  Order for Tele Monitor? Yes    Additional Lines: Oxygen and Hamilton Catheter    4eyes on Skin: yes    Medicines sent: Muprocoin; Artificial Tears     Any special needs or follow-up needed: n/a     Patient belongings transferred with patient: Yes; Wife at bedside with belongings.     Chart send with patient: Yes    Notified: spouse, Mohini At bedside.     Patient reassessed at: 9/12 @1715   Upon arrival to floor: cardiac monitor applied, patient oriented to room, call bell in reach, and bed in lowest position; Patient nurse/charge at bedside with arrival of patient. Patient independently ambulated to bedside with stand by assist. Oxygen saturations prior to transfer were 92% on 8Liters. Post Transfer 89% on 8L. Stepdown nurse increased oxygen to 12 Liters with patient re-cooperating 92-93% quickly. Respiratory rate remained between 25-35 breaths/min with transition. Remained Sinus Tachy High 90s/100s. Mildly SOB; Primarily congested. AAOx4. Wife at bedside. Handed off to Stepdown Staff.  No Further issues.

## 2023-09-12 NOTE — NURSING TRANSFER
Nurses Note -- 4 Eyes      9/12/2023   6:42 PM      Skin assessed during: Transfer      [x] No Altered Skin Integrity Present    [x]Prevention Measures Documented      [] Yes- Altered Skin Integrity Present or Discovered   [] LDA Added if Not in Epic (Describe Wound)   [] New Altered Skin Integrity was Present on Admit and Documented in LDA   [] Wound Image Taken    Wound Care Consulted? No    Attending Nurse:  Ayaan Ochoa RN      Second RN/Staff Member:   Desire Riley RN

## 2023-09-12 NOTE — PLAN OF CARE
MICU DAILY GOALS     Family/Goals of care/Code Status   Code Status: Full Code    24H Vital Sign Range  Temp:  [98.5 °F (36.9 °C)-99.6 °F (37.6 °C)]   Pulse:  []   Resp:  [18-34]   BP: (107-162)/(60-77)   SpO2:  [89 %-95 %]      Shift Events   No acute events throughout shift. Wore his BIPAP for few hours during the night.    AWAKE RASS: Goal -    Actual -      Restraint necessity: Not necessary   BREATHE SBT: Not intubated    Coordinate A & B, analgesics/sedatives Pain: managed   SAT: Not intubated   Delirium CAM-ICU: Overall CAM-ICU: Negative   Early(intubated/ Progressive (non-intubated) Mobility MOVE Screen: Pass   Activity: Activity Management: Rolling - L1   Feeding/Nutrition Diet order: Diet/Nutrition Received: regular,     Thrombus DVT prophylaxis: VTE Required Core Measure: Pharmacological prophylaxis initiated/maintained   HOB Elevation Head of Bed (HOB) Positioning: HOB at 30-45 degrees   Ulcer Prophylaxis GI: no   Glucose control managed Glycemic Management: blood glucose monitored   Skin Skin assessed during daily assessment.    Bowel Function no issues    Indwelling Catheter Necessity      Urethral Catheter 09/11/23 0400 16 Fr.-Reason for Continuing Urinary Catheterization: Critically ill in ICU and requiring hourly monitoring of intake/output, Urinary retention  [REMOVED]      Urethral Catheter-Reason for Continuing Urinary Catheterization: Urinary retention          De-escalation Antibiotics No       VS and assessment per flow sheet, patient progressing towards goals as tolerated, plan of care reviewed with  pt , all concerns addressed, will continue to monitor.

## 2023-09-12 NOTE — PLAN OF CARE
PT evaluation complete - see note for details. POC and goals established.    Problem: Physical Therapy  Goal: Physical Therapy Goal  Description: Goals to be met by: 23     Patient will increase functional independence with mobility by performin. Supine to sit with Stand-by Assistance  2. Sit to supine with Stand-by Assistance  3. Sit to stand transfer with Supervision  4. Bed to chair transfer with Supervision using No Assistive Device  5. Gait  x 200 feet with Supervision using No Assistive Device.   6. Navigate 16 steps with bilateral handrail with supervision with no AD.    Outcome: Ongoing, Progressing     2023

## 2023-09-12 NOTE — CLINICAL REVIEW
IP Sepsis Screen (most recent)       Sepsis Screen (IP) - 09/10/23 6853       Is the patient's history or complaint suggestive of a possible infection? Yes  -    Are there at least two of the following signs and symptoms present? Yes  -    Sepsis signs/symptoms - Tachycardia Tachycardia     >90  -    Sepsis signs/symptoms - Tachypnea Tachypnea     >20  -    Sepsis signs/symptoms - WBC WBC < 4,000 or WBC > 12,000  -    Are any of the following organ dysfunction criteria present and not considered to be due to a chronic condition? Yes  -    Organ Dysfunction Criteria Creatinine > 2.0  -    Initiate Sepsis Protocol No  -JM    Reason sepsis not considered Pt. receiving appropriate management  -              User Key  (r) = Recorded By, (t) = Taken By, (c) = Cosigned By      Initials Name    Harini Javier RN                   
IP Sepsis Screen (most recent)       Sepsis Screen (IP) - 09/12/23 9773       Is the patient's history or complaint suggestive of a possible infection? Yes  -CB    Are there at least two of the following signs and symptoms present? Yes  -CB    Sepsis signs/symptoms - Tachycardia Tachycardia     >90  -CB    Sepsis signs/symptoms - Tachypnea Tachypnea     >20  -CB    Sepsis signs/symptoms - WBC WBC < 4,000 or WBC > 12,000  -CB    Are any of the following organ dysfunction criteria present and not considered to be due to a chronic condition? Yes  -CB    Organ Dysfunction Criteria Creatinine > 2.0  -CB    Initiate Sepsis Protocol No  -CB    Reason sepsis not considered Pt. receiving appropriate management  -CB              User Key  (r) = Recorded By, (t) = Taken By, (c) = Cosigned By      Initials Name    CB Estella Chavez, RN                   
Initial (On Arrival)

## 2023-09-12 NOTE — ASSESSMENT & PLAN NOTE
"Patient with Hypercapnic and Hypoxic Respiratory failure which is Acute.  he is not on home oxygen. Supplemental oxygen was provided and noted-      .   Signs/symptoms of respiratory failure include- tachypnea, increased work of breathing, respiratory distress and wheezing. Contributing diagnoses includes - Pneumonia Labs and images were reviewed. Patient Has not had a recent ABG. Recent VBG reviewed.    · Patient weaned to nasal cannula on 10 L oxygen  · See "pneumonia" for treatment   "

## 2023-09-12 NOTE — RESPIRATORY THERAPY
RAPID RESPONSE RESPIRATORY CHART CHECK       Chart check completed.  Please call [phone] for further concerns or assistance.

## 2023-09-12 NOTE — PROGRESS NOTES
[facility] - Cardiac Medical ICU  Critical Care Medicine  Progress Note    Patient Name: Umair Kenney  MRN: [id number]  Admission Date: 9/10/2023  Hospital Length of Stay: 2 days  Code Status: Full Code  Attending Provider: Jayjay Campos MD  Primary Care Provider: Stacia, Primary Doctor   Principal Problem: Severe sepsis    Subjective:     HPI:  63-year-old male with medical history of hypertension that presents to the ED directly from returning from cruise to [place] for 1 week. Prior to leaving [place] on the cruise, he starting to feel sick. He reports having fever, chills, and a productive cough on the first day of the cruise after being out in the rain. The cruise doctor treated him with Z pack/cough suppressant. Throughout the trip, his symptoms progressively worsened and began having diarrhea (10 times daily), frequent urination and shortness of breath. He was seen again by the doctor this morning and found to have PNA on CXR. Denies any sick contacts or anyone else on cruise getting sick. He is from [place], [place]. He was hospitalized for 10 days with COVID-19 early on in the pandemic with no prior history of intubation. He denies any history of smoking. He reports not drinking alcohol for more than three years.     Patient's BP was in the 50s/40s on arrival per EMS. He was given a 1L of fluid and satting in the low 90s on 5L NC. Due to his acidemia and RR in the 30s on arrival, he was put on BIPAP and weaned to comfort flow satting 95-96%.      Labs notable for WBC 23, Na 128, K 3.2, bicarb 16, anion gap 20, Cr 3.5, AST//89, Procal 26, VBG - ph 7.2, CO2 49, HCO3 20.5, lactic 5.78, point of care lactic 1.85. He was started on IV vanc/cefepime/azithromycin and admitted to MICU.       Hospital/ICU Course:  Patient admitted to MICU for increased work of breathing secondary to pneumonia. His legionella studies are pending. Initially treated with azithromycin, cefepime, and vancomycin and narrowed to  ceftriaxone and azithromycin on 9/12/23. O2 requirements improving. Patient is not tolerating BiPAP overnight. Since admission, he has had worsening LFTs with workup including liver ultrasound and hepatitis panel.       Interval History/Significant Events: No acute events overnight. He did not tolerate BiPAP well and says he did not sleep well. Patient has been weaned to 10 L oxygen via nasal cannula and reports improved diarrhea. No RUQ pain. His sCr improved and he had >4 L of urine output over 24 hours.     Review of Systems   Constitutional:  Positive for fatigue. Negative for chills and fever.   HENT:  Negative for congestion, rhinorrhea and sore throat.    Respiratory:  Positive for cough, chest tightness and shortness of breath.    Cardiovascular:  Negative for chest pain, palpitations and leg swelling.   Gastrointestinal:  Positive for diarrhea. Negative for abdominal pain, blood in stool, nausea and vomiting.   Genitourinary:  Negative for dysuria, flank pain and hematuria.   Musculoskeletal:  Negative for back pain and neck pain.   Skin:  Negative for wound.   Neurological:  Negative for dizziness, weakness and headaches.     Objective:     Vital Signs (Most Recent):  Temp: 99.4 °F (37.4 °C) (09/12/23 0900)  Pulse: 84 (09/12/23 1205)  Resp: (!) 22 (09/12/23 1205)  BP: 135/72 (09/12/23 1200)  SpO2: 96 % (09/12/23 1205) Vital Signs (24h Range):  Temp:  [98.6 °F (37 °C)-99.6 °F (37.6 °C)] 99.4 °F (37.4 °C)  Pulse:  [] 84  Resp:  [18-34] 22  SpO2:  [89 %-97 %] 96 %  BP: (107-158)/(62-77) 135/72   Weight: 128.8 kg (284 lb)  Body mass index is 41.94 kg/m².      Intake/Output Summary (Last 24 hours) at 9/12/2023 1317  Last data filed at 9/12/2023 1237  Gross per 24 hour   Intake 1424.91 ml   Output 4295 ml   Net -2870.09 ml          Physical Exam  Vitals and nursing note reviewed.   Constitutional:       Appearance: He is obese. He is not diaphoretic.   HENT:      Head: Normocephalic and atraumatic.       Nose: Nose normal.      Comments: NC in place      Mouth/Throat:      Mouth: Mucous membranes are moist.   Eyes:      Conjunctiva/sclera: Conjunctivae normal.   Cardiovascular:      Rate and Rhythm: Normal rate and regular rhythm.      Pulses: Normal pulses.      Heart sounds: Normal heart sounds. No murmur heard.     No gallop.   Pulmonary:      Effort: No respiratory distress.      Breath sounds: Wheezing present.   Abdominal:      General: There is no distension.      Palpations: Abdomen is soft.      Tenderness: There is no abdominal tenderness. There is no guarding or rebound.   Musculoskeletal:      Right lower leg: No edema.      Left lower leg: No edema.   Skin:     General: Skin is warm and dry.   Neurological:      Mental Status: He is alert.            Vents:  Oxygen Concentration (%): 67 (09/10/23 1359)  Lines/Drains/Airways       Drain  Duration                  Urethral Catheter 09/11/23 0400 16 Fr. 1 day              Peripheral Intravenous Line  Duration                  Peripheral IV - Single Lumen 09/10/23 0901 20 G Anterior;Left Forearm 2 days         Peripheral IV - Single Lumen 09/10/23 0904 20 G Left;Posterior Hand 2 days                  Significant Labs:    CBC/Anemia Profile:  Recent Labs   Lab 09/11/23 0313 09/12/23  0140   WBC 22.91* 20.03*   HGB 11.6* 11.8*   HCT 33.4* 34.1*    296   MCV 90 91   RDW 14.3 14.7*        Chemistries:  Recent Labs   Lab 09/10/23  1843 09/11/23  0313 09/12/23  0140   * 131* 135*   K 3.2* 3.4* 3.9   CL 93* 95 99   CO2 21* 19* 22*   BUN 68* 68* 57*   CREATININE 3.3* 3.2* 2.3*   CALCIUM 7.9* 7.9* 8.1*   ALBUMIN 2.0* 1.9* 1.8*   PROT 6.5 6.5 6.8   BILITOT 0.9 0.7 0.8   ALKPHOS 118 118 159*   ALT 86* 77* 154*   * 126* 247*   MG  --  2.8* 2.9*   PHOS  --  4.9* 2.5*       CMP:   Recent Labs   Lab 09/10/23  1843 09/11/23  0313 09/12/23  0140   * 131* 135*   K 3.2* 3.4* 3.9   CL 93* 95 99   CO2 21* 19* 22*   * 107 128*   BUN 68* 68* 57*    CREATININE 3.3* 3.2* 2.3*   CALCIUM 7.9* 7.9* 8.1*   PROT 6.5 6.5 6.8   ALBUMIN 2.0* 1.9* 1.8*   BILITOT 0.9 0.7 0.8   ALKPHOS 118 118 159*   * 126* 247*   ALT 86* 77* 154*   ANIONGAP 16 17* 14       Significant Imaging:  I have reviewed all pertinent imaging results/findings within the past 24 hours.    US LIVER WITH DOPPLER     CLINICAL HISTORY:   Transaminitis;     TECHNIQUE:   Ultrasound of the liver (including pancreas, liver, gallbladder, common bile duct, spleen, IVC) with color and spectral evaluation was performed.     COMPARISON:   CT chest 09/10/2023.     FINDINGS:   Liver: Enlarged, measuring 20.0 cm. Diffusely increased parenchymal echogenicity consistent with steatosis.  The hepatorenal index is 1.6.  No focal hepatic lesions.     Gallbladder: The gallbladder is surgically absent.     Biliary system: The common duct is not dilated, measuring 2 mm.  No intrahepatic ductal dilatation.     Spleen: Enlarged measuring 12.2 x 4.4 cm, with a homogeneous echotexture.     Pancreas: Partially obscured by overlying bowel gas.  The visualized portions of pancreas appear normal.     Inferior vena cava: Normal in appearance.     Miscellaneous: No ascites.     Main hepatic artery: Patent.     Main portal vein: Patent with proper directional flow.  9 mm caliber, within normal limits.     Left portal vein:  Patent with proper directional flow.     Right portal vein:  Patent with proper directional flow.     Splenic vein: Patent with proper directional flow.     SMV:  Patent with proper directional flow.     IVC: Patent     Left, middle, and right hepatic veins: Patent.     Umbilical vein: Not patent.     Collaterals: None.    Impression:       Patent portal and hepatic vasculature.     Hepatomegaly with steatosis.     Splenomegaly.     Cholecystectomy.        Results for orders placed during the hospital encounter of 09/10/23    Echo    Interpretation Summary    Left Ventricle: The left ventricle is normal in  "size. Normal wall thickness. Normal wall motion. There is normal systolic function with a visually estimated ejection fraction of 55 - 60%. There is normal diastolic function.    Right Ventricle: Mild right ventricular enlargement. Systolic function is mildly reduced.    Pulmonary Artery: The estimated pulmonary artery systolic pressure is 34 mmHg.    IVC/SVC: Intermediate venous pressure at 8 mmHg.    Aorta: Aortic root is mildly dilated measuring 4.4 cm. Ascending aorta is normal.           ABG  Recent Labs   Lab 09/10/23  0901   PH 7.224*   PO2 61*   PCO2 49.6*   HCO3 20.5*   BE -7     Assessment/Plan:     Pulmonary  Pneumonia  63-year-old man presenting directly from cruise ship with pneumonia treated with a Z-monique. He complains of shortness of breath, nausea, diarrhea, coughing, and fatigue.     CXR: "Patchy opacities involving the right greater left lung, concerning for infectious etiology.  Imaging follow-up to resolution in 4-6 weeks would be recommended."     · Antibiotics narrowed to ceftriaxone and azithromycin  · Procal 26.88 on presentation   · Legionella testing pending  · Sputum culture with normal respiratory luther  · Nasal cannula 10 L oxygen  · Duonebs q4  · BiPAP cancelled as patient does not tolerate overnight           Acute respiratory failure with hypoxia and hypercarbia  Patient with Hypercapnic and Hypoxic Respiratory failure which is Acute.  he is not on home oxygen. Supplemental oxygen was provided and noted-      .   Signs/symptoms of respiratory failure include- tachypnea, increased work of breathing, respiratory distress and wheezing. Contributing diagnoses includes - Pneumonia Labs and images were reviewed. Patient Has not had a recent ABG. Recent VBG reviewed.    · Patient weaned to nasal cannula on 10 L oxygen  · See "pneumonia" for treatment     Cardiac/Vascular  HTN (hypertension)  · Holding home amlodipine, losartan, and triamterene/HCTZ for acute infection  · Resume as " clinically indicated    Renal/  High anion gap metabolic acidosis  Labs on admission: , Cl 92, Bicard 16. Anion gap = 20. Likely secondary to dehydration and diarrhea.     · IVF fluids and improved oral intake     Hypokalemia  · Replete as needed    Hyponatremia  Patient presents with Na 128. Predisposing medications: HCTZ    Diagnostics: Urine Na 18, Urine Osm 285, Serum Osm 288 on admission    DDx: Likely secondary to Hypovolemia/Dehydration    · Improving, 135 on 9/12  · Trend Na          DANNI (acute kidney injury)  Patient presenting with DANNI with admission sCr 3.5 (baseline 0.8)    Serum creatinine: 2.3 mg/dL (H) 09/12/23 0140  Estimated creatinine clearance: 44.2 mL/min (A)    DDx: Likely due to pre-renal azotemia vs acute tubular necrosis due to dehydration/infection. Large UOP at 4120 mL over 24 hours, possibly diuresis phase of ATN.     · Trend sCr  · Trend RFP; replete electrolytes PRN for goal K > 4, Phos > 3, Mg > 2  · Strict I&Os  · Avoid nephrotoxic agents  · Renally adjust medications      ID  * Severe sepsis  This patient does have evidence of infective focus  My overall impression is sepsis.  Source: Respiratory  Antibiotics given-   Antibiotics (72h ago, onward)    Start     Stop Route Frequency Ordered    09/12/23 2100  cefTRIAXone (ROCEPHIN) 2 g in dextrose 5 % in water (D5W) 100 mL IVPB (MB+)         -- IV Every 24 hours (non-standard times) 09/12/23 1051    09/12/23 1400  azithromycin (ZITHROMAX) 500 mg in dextrose 5 % (D5W) 250 mL IVPB (Vial-Mate)         09/13/23 1359 IV Every 24 hours (non-standard times) 09/12/23 1049    09/10/23 2230  mupirocin 2 % ointment         09/15/23 2059 Nasl 2 times daily 09/10/23 2123        Latest lactate reviewed-  Recent Labs   Lab 09/10/23  1440   LACTATE 1.7     Organ dysfunction indicated by Acute kidney injury and Acute liver injury    Fluid challenge Actual Body weight- Patient will receive 30ml/kg actual body weight to calculate fluid bolus for  "treatment of septic shock.     Will Not start Pressors- Levophed for MAP of 65  Source control achieved by: antibiotics    · Leukocytosis 23, lactic 5.88 on presentation- 1.85 on repeat, procal 26.88, DANNI, transaminitis on presentation  · For treatment of source see "pneumonia"      Endocrine  Severe obesity (BMI >= 40)  Body mass index is 42.83 kg/m². Morbid obesity complicates all aspects of disease management from diagnostic modalities to treatment. Weight loss encouraged and health benefits explained to patient.    GI  Transaminitis  63-year-old man presenting with severe sepsis likely secondary to pneumonia. Elevations likely due to infection/metabolic acidosis. Due to his increasing LFTs, liver ultrasound with doppler and hepatitis panel were ordered to rule out additional etiologies.     Liver Ultrasound 9/12/23: Patent portal and hepatic vasculature. Hepatomegaly with steatosis. Splenomegaly. Cholecystectomy.     · Admission AST//89, with elevation to 247/154  · Trend CMP  · Hepatitis panel due to travel history, non reactive          Critical Care Daily Checklist:    A: Awake: RASS Goal/Actual Goal:    Actual:     B: Spontaneous Breathing Trial Performed?  N/A   C: SAT & SBT Coordinated?  N/A                      D: Delirium: CAM-ICU Overall CAM-ICU: Negative   E: Early Mobility Performed? No   F: Feeding Goal:    Status:     Current Diet Order   Procedures    Diet Adult Regular (IDDSI Level 7)      AS: Analgesia/Sedation none   T: Thromboembolic Prophylaxis heparin   H: HOB > 300 Yes   U: Stress Ulcer Prophylaxis (if needed) NA   G: Glucose Control < 180   B: Bowel Function Stool Occurrence: 1   I: Indwelling Catheter (Lines & Maurice) Necessity PIV, maurice   D: De-escalation of Antimicrobials/Pharmacotherapies D/C vancomycin, cefepime. Start ceftriaxone and continue azithro    Plan for the day/ETD Step down, wean oxygen, duonebs    Code Status:  Family/Goals of Care: Full Code         Critical " secondary to Patient has a condition that poses threat to life and bodily function: Severe Respiratory Distress      Critical care was time spent personally by me on the following activities: development of treatment plan with patient or surrogate and bedside caregivers, discussions with consultants, evaluation of patient's response to treatment, examination of patient, ordering and performing treatments and interventions, ordering and review of laboratory studies, ordering and review of radiographic studies, pulse oximetry, re-evaluation of patient's condition. This critical care time did not overlap with that of any other provider or involve time for any procedures.     Kenji Hall MD  Critical Care Medicine  [facility] - Cardiac Medical ICU

## 2023-09-12 NOTE — PLAN OF CARE
Problem: Occupational Therapy  Goal: Occupational Therapy Goal  Description: Goals to be met by: 7 days 9/19/23     Patient will increase functional independence with ADLs by performing:    Pt to complete UE dressing with set-up  Pt to complete LE dressing with SBA  Pt to complete toileting with SBA  Pt to complete standing g/h skills with SBA  Pt to complete t/f bed, chair and commode with SBA   Outcome: Ongoing, Progressing

## 2023-09-12 NOTE — PT/OT/SLP EVAL
Occupational Therapy  Co Evaluation    Name: Umair Kenney  MRN: [id number]  Admitting Diagnosis: Severe sepsis  Pt is from [place] and was not feeling well. He boarded a cruise to [place] from [facility] and continued to feel poorly. Pt returned to [facility] and was admitted with SOB and found to be have severe sepsis.     Recommendations:     Discharge Recommendations:    REHAB    Assessment:     Umair Kenney is a 63 y.o. male with a medical diagnosis of Severe sepsis. Performance deficits affecting function: weakness, impaired endurance, impaired self care skills, impaired functional mobility, gait instability, impaired balance.  Pt tolerated session well with good effort and performance. Pt appears mosty limited by impaired endurance for functional activity.   Anticipate pt will benefit from REHAB prior to return home to increase safety and independence.     Rehab Prognosis: Good; patient would benefit from acute skilled OT services to address these deficits and reach maximum level of function.       Plan:     Patient to be seen 3 x/week to address the above listed problems via self-care/home management, therapeutic activities, therapeutic exercises  Plan of Care Expires:    Plan of Care Reviewed with: patient, spouse    Subjective     Pt agreeable to therapy.   Pt denies pain     Occupational Profile:  Pt lives in [place] with his spouse in 2 story apartment with 3 AYUSH. Pt has 16 steps to bedroom on second floor. Pt was independent including working, driving and traveling.   Spouse is staying in hotel locally in [facility]     Pain/Comfort:  Pain Rating 1: 0/10    Patients cultural, spiritual, Adventism conflicts given the current situation: no    Objective:     Communicated with: nsg prior to session.  Patient found in bed with nsg present and spouse in room. Nsg was assisting pt to sitting EOB.   Pt with maurice, tele, pulse ox, BP cuff, IV and 8 LPM.   Coeval completed this date to optimize functional performance and safety given  impaired tolerance for activity in setting of ICU     General Precautions: Standard,  fall    Occupational Performance:    Bed mobility:   Supine>sit with MIN A     Functional Mobility/Transfers:  Sit>stand with CGA  Few steps to chair with CGA     Activities of Daily Living:  Feeding: set-up  G/H; stated with set-up. Unable to tolerate task in stand due to impaired standing endudrance.   LE dressing: MIN A simulated    Cognitive/Visual Perceptual  Pt awake, alert and following commands.     Physical Exam  Pt is right hand dominant and demo WFL UE strength/ROM, coordination and sensation.   Pt with good .       AMPAC 6 Click ADL:  AMPAC Total Score: 19    Treatment & Education:  Pt demo SBA for postural control seated EOB. Pt able to stand and t/f to chair with CGA.   Pt noted to have SOB needing increased time and cues for slow, deep breathing. Saturation remaining 88-89% with activity.     Education provided re: OT POC and safety with functional mobility/ADL skills.       Patient left up in chair with all lines intact, call button in reach, nsg notified, and spouse present    GOALS:   Multidisciplinary Problems       Occupational Therapy Goals          Problem: Occupational Therapy    Goal Priority Disciplines Outcome Interventions   Occupational Therapy Goal     OT, PT/OT Ongoing, Progressing    Description: Goals to be met by: 7 days 9/19/23     Patient will increase functional independence with ADLs by performing:    Pt to complete UE dressing with set-up  Pt to complete LE dressing with SBA  Pt to complete toileting with SBA  Pt to complete standing g/h skills with SBA  Pt to complete t/f bed, chair and commode with SBA                        History:     Past Medical History:   Diagnosis Date    Hypertension        History reviewed. No pertinent surgical history.    Time Tracking:     OT Date of Treatment: 09/12/23  OT Start Time: 0931  OT Stop Time: 0943  OT Total Time (min): 12 min    Billable  Minutes:Evaluation 12    9/12/2023

## 2023-09-12 NOTE — ASSESSMENT & PLAN NOTE
"This patient does have evidence of infective focus  My overall impression is sepsis.  Source: Respiratory  Antibiotics given-   Antibiotics (72h ago, onward)    Start     Stop Route Frequency Ordered    09/12/23 2100  cefTRIAXone (ROCEPHIN) 2 g in dextrose 5 % in water (D5W) 100 mL IVPB (MB+)         -- IV Every 24 hours (non-standard times) 09/12/23 1051    09/12/23 1400  azithromycin (ZITHROMAX) 500 mg in dextrose 5 % (D5W) 250 mL IVPB (Vial-Mate)         09/13/23 1359 IV Every 24 hours (non-standard times) 09/12/23 1049    09/10/23 2230  mupirocin 2 % ointment         09/15/23 2059 Nasl 2 times daily 09/10/23 2123        Latest lactate reviewed-  Recent Labs   Lab 09/10/23  1440   LACTATE 1.7     Organ dysfunction indicated by Acute kidney injury and Acute liver injury    Fluid challenge Actual Body weight- Patient will receive 30ml/kg actual body weight to calculate fluid bolus for treatment of septic shock.     Will Not start Pressors- Levophed for MAP of 65  Source control achieved by: antibiotics    · Leukocytosis 23, lactic 5.88 on presentation- 1.85 on repeat, procal 26.88, DANNI, transaminitis on presentation  · For treatment of source see "pneumonia"    "

## 2023-09-12 NOTE — CONSULTS
Therapy with vancomycin discontinued by provider. Pharmacy will sign off, please re-consult as needed.    Carley Young, PharmD, BCCCP  [id number]

## 2023-09-12 NOTE — ASSESSMENT & PLAN NOTE
"63-year-old man presenting directly from cruise ship with pneumonia treated with a Z-monique. He complains of shortness of breath, nausea, diarrhea, coughing, and fatigue.     CXR: "Patchy opacities involving the right greater left lung, concerning for infectious etiology.  Imaging follow-up to resolution in 4-6 weeks would be recommended."     · Antibiotics narrowed to ceftriaxone and azithromycin  · Procal 26.88 on presentation   · Legionella testing pending  · Sputum culture with normal respiratory luther  · Nasal cannula 10 L oxygen  · Duonebs q4  · BiPAP cancelled as patient does not tolerate overnight         "

## 2023-09-12 NOTE — NURSING
"Arrived on unit from MICU diaphoretic, with oxygen saturation 88% on 8L of high flow. HR tachy(100-110s.) Patient states breathing is "decent", but confirms feeling winded/SOB.  Patient placed at 12L high flow with RT at beside. Oxygen saturation and heart rate improving with increase in oxygen.   "

## 2023-09-12 NOTE — PT/OT/SLP EVAL
Physical Therapy Co-Evaluation and Treatment     Patient Name:  Umair Kenney  MRN: [id number]    Admit Date: 9/10/2023  Admitting Diagnosis:  Severe sepsis  Length of Stay: 2 days  Recent Surgery: * No surgery found *      Recommendations:     Discharge Recommendations: Inpatient Rehabilitation Facility   Equipment recommendations:  TBD at next level of care  Barriers to discharge: Increased level of skilled assistance required    Assessment:     Umair Kenney is a 63 y.o. male [place] [facility] [place] with medical diagnosis of Severe sepsis. Pt presents with weakness, impaired endurance, impaired functional mobility, impaired cardiopulmonary response to activity. These deficits effect their roles and responsibilities in which they were able to complete prior to admit.     Pt is agreeable to therapy evaluation and session. Pt in process up getting OOB while RN when therapists entered room. PTA, pt was (I) with ambulation. Pt was on a cruise when he initially fell ill. Pt able to sit eob and transfer to recliner. Sats 88-89% throughout session. Will continue to progress pt as tolerated during admsision.    Umair Kenney would benefit from acute PT intervention to improve quality of life, focus on recovery of impairments, provide patient/caregiver education, reduce fall risk, and maximize (I) and safety with functional mobility. Once medically stable, recommending pt discharge to Inpatient Rehabilitation Facility .      Rehab Prognosis: Good    Plan:     During this hospitalization, patient to be seen 3 x/week to address the identified rehab impairments via gait training, therapeutic activities, therapeutic exercises, neuromuscular re-education and progress towards stated goals.     Plan of Care Expires:  10/12/23  Plan of Care reviewed with: patient, spouse    This plan of care has been discussed with the patient/caregiver, who was included in its development and is in agreement with the identified goals and  treatment plan.     Subjective     Communicated with RN prior to session.  Patient found sitting edge of bed upon PT entry to room, agreeable to evaluation. Pt's wife present during session.    Chief Complaint: SOB    Patient/Family Comments/goals: none stated    Pain/Comfort:  Pain Rating 1: 0/10  Pain Rating Post-Intervention 1: 0/10    Patients cultural, spiritual, Mandaen conflicts given the current situation: None identified     Patient History: information obtained from pt     Living Environment: Pt lives with wife in [facility] with 3 AYUSH with unilateral HR. 16 steps to 2nd floor with celi HR (Can reach both at same time). Bedroom is on 2nd level. Bathroom set-up: tub/shower combo  Prior Level of Function: independent with mobility and ADLs  DME owned: none  Drives: yes  Works:  yes - in [employer]  Support Available/Caregiver Assistance:  wife    Objective:   OT present for coeval due to pt's multiple medical comorbidities and functional/cognition deficits requiring two skilled therapists to appropriately progress pt's musculoskeletal strength, neuromuscular control, and endurance while taking into consideration medical acuity and pt safety.    Patient found with: maurice catheter, oxygen, telemetry, blood pressure cuff, pulse ox (continuous)    Recent Surgery: * No surgery found *    General Precautions: Standard, fall   Orthopedic Precautions:    Braces:     Oxygen Device: nasal cannula - 8L      Exams:    Cognition:  Alert  Command following: Follows multistep verbal commands  Communication: clear/fluent    Sensation:   Light touch sensation: Intact BLEs    Gross Motor Coordination: No deficits noted during functional mobility tasks     Edema/Skin Integrity: None noted; Visible skin intact    Postural examination/scapula alignment:  no deficits noted    Lower Extremity Range of Motion:  Right Lower Extremity: WFL  Left Lower Extremity: WFL    Lower Extremity Strength:  Right Lower Extremity: WFL  Left  Lower Extremity: WFL    Functional Mobility:    Bed Mobility:  Supine > Sit with minimum assistance    Transfers:   Sit <> Stand Transfer: CGA x 1 trials from eob with no AD   Bed <> Chair: CGA with no AD            Balance:  Dynamic Sitting: FAIR+: Maintains balance through MINIMAL excursions of active trunk motion  Standing:  Static: FAIR+: Takes MINIMAL challenges from all directions   Dynamic: FAIR: Needs CONTACT GUARD during gait    Outcome Measure: AM-PAC 6 CLICK MOBILITY  Total Score:18     Patient/Caregiver Education:     Therapist educated pt/caregiver regarding:   PT POC and goals for therapy   Safety with mobility and fall risk   Instruction on use of call button and importance of calling nursing staff for assistance with mobility   Time provided for therapeutic counseling and discussion of current health disposition. All questions answered to satisfaction, within scope of PT practice     Patient/caregiver able to verbalize understanding and expressed no further questions this visit; will follow-up with pt/caregiver during current admit for additional questions/concerns within scope of practice.     White board updated.     Patient left up in chair with all lines intact, call button in reach, and RN notified.    GOALS:   Multidisciplinary Problems       Physical Therapy Goals          Problem: Physical Therapy    Goal Priority Disciplines Outcome Goal Variances Interventions   Physical Therapy Goal     PT, PT/OT Ongoing, Progressing     Description: Goals to be met by: 23     Patient will increase functional independence with mobility by performin. Supine to sit with Stand-by Assistance  2. Sit to supine with Stand-by Assistance  3. Sit to stand transfer with Supervision  4. Bed to chair transfer with Supervision using No Assistive Device  5. Gait  x 200 feet with Supervision using No Assistive Device.   6. Navigate 16 steps with bilateral handrail with supervision with no AD.                            History:     Past Medical History:   Diagnosis Date    Hypertension        History reviewed. No pertinent surgical history.    Time Tracking:     PT Received On: 09/12/23  PT Start Time: 0931     PT Stop Time: 0942  PT Total Time (min): 11 min     Billable Minutes: Evaluation 11    09/12/2023

## 2023-09-13 LAB
ADENOVIRUS: NOT DETECTED
ALBUMIN SERPL BCP-MCNC: 1.9 G/DL (ref 3.5–5.2)
ALLENS TEST: ABNORMAL
ALP SERPL-CCNC: 154 U/L (ref 55–135)
ALT SERPL W/O P-5'-P-CCNC: 189 U/L (ref 10–44)
ANION GAP SERPL CALC-SCNC: 13 MMOL/L (ref 8–16)
ANISOCYTOSIS BLD QL SMEAR: SLIGHT
AST SERPL-CCNC: 204 U/L (ref 10–40)
BASOPHILS NFR BLD: 0 % (ref 0–1.9)
BILIRUB SERPL-MCNC: 0.6 MG/DL (ref 0.1–1)
BORDETELLA PARAPERTUSSIS (IS1001): NOT DETECTED
BORDETELLA PERTUSSIS (PTXP): NOT DETECTED
BUN SERPL-MCNC: 38 MG/DL (ref 8–23)
CALCIUM SERPL-MCNC: 8.5 MG/DL (ref 8.7–10.5)
CHLAMYDIA PNEUMONIAE: NOT DETECTED
CHLORIDE SERPL-SCNC: 98 MMOL/L (ref 95–110)
CO2 SERPL-SCNC: 29 MMOL/L (ref 23–29)
CORONAVIRUS 229E, COMMON COLD VIRUS: NOT DETECTED
CORONAVIRUS HKU1, COMMON COLD VIRUS: NOT DETECTED
CORONAVIRUS NL63, COMMON COLD VIRUS: NOT DETECTED
CORONAVIRUS OC43, COMMON COLD VIRUS: NOT DETECTED
CREAT SERPL-MCNC: 1.4 MG/DL (ref 0.5–1.4)
DELSYS: ABNORMAL
DIFFERENTIAL METHOD: ABNORMAL
DOHLE BOD BLD QL SMEAR: PRESENT
EOSINOPHIL NFR BLD: 0 % (ref 0–8)
ERYTHROCYTE [DISTWIDTH] IN BLOOD BY AUTOMATED COUNT: 14.8 % (ref 11.5–14.5)
EST. GFR  (NO RACE VARIABLE): 56.8 ML/MIN/1.73 M^2
FLOW: 8
FLUBV RNA NPH QL NAA+NON-PROBE: NOT DETECTED
GLUCOSE SERPL-MCNC: 120 MG/DL (ref 70–110)
HCO3 UR-SCNC: 34 MMOL/L (ref 24–28)
HCT VFR BLD AUTO: 37 % (ref 40–54)
HCT VFR BLD CALC: 39 %PCV (ref 36–54)
HGB BLD-MCNC: 12.4 G/DL (ref 14–18)
HPIV1 RNA NPH QL NAA+NON-PROBE: NOT DETECTED
HPIV2 RNA NPH QL NAA+NON-PROBE: NOT DETECTED
HPIV3 RNA NPH QL NAA+NON-PROBE: NOT DETECTED
HPIV4 RNA NPH QL NAA+NON-PROBE: NOT DETECTED
HUMAN METAPNEUMOVIRUS: NOT DETECTED
IMM GRANULOCYTES # BLD AUTO: ABNORMAL K/UL (ref 0–0.04)
IMM GRANULOCYTES NFR BLD AUTO: ABNORMAL % (ref 0–0.5)
L PNEUMO AB SER QL: NEGATIVE
LYMPHOCYTES NFR BLD: 16 % (ref 18–48)
MAGNESIUM SERPL-MCNC: 2.7 MG/DL (ref 1.6–2.6)
MCH RBC QN AUTO: 31.2 PG (ref 27–31)
MCHC RBC AUTO-ENTMCNC: 33.5 G/DL (ref 32–36)
MCV RBC AUTO: 93 FL (ref 82–98)
METAMYELOCYTES NFR BLD MANUAL: 9 %
MODE: ABNORMAL
MONOCYTES NFR BLD: 6 % (ref 4–15)
MYCOPLASMA PNEUMONIAE: NOT DETECTED
MYELOCYTES NFR BLD MANUAL: 2 %
NEUTROPHILS NFR BLD: 60 % (ref 38–73)
NEUTS BAND NFR BLD MANUAL: 7 %
NRBC BLD-RTO: 0 /100 WBC
OVALOCYTES BLD QL SMEAR: ABNORMAL
PCO2 BLDA: 49.1 MMHG (ref 35–45)
PH SMN: 7.45 [PH] (ref 7.35–7.45)
PHOSPHATE SERPL-MCNC: 2.7 MG/DL (ref 2.7–4.5)
PLATELET # BLD AUTO: 371 K/UL (ref 150–450)
PMV BLD AUTO: 10.9 FL (ref 9.2–12.9)
PO2 BLDA: 76 MMHG (ref 80–100)
POC BE: 10 MMOL/L
POC IONIZED CALCIUM: 1.08 MMOL/L (ref 1.06–1.42)
POC SATURATED O2: 95 % (ref 95–100)
POC TCO2: 36 MMOL/L (ref 23–27)
POCT GLUCOSE: 118 MG/DL (ref 70–110)
POCT GLUCOSE: 130 MG/DL (ref 70–110)
POCT GLUCOSE: 140 MG/DL (ref 70–110)
POCT GLUCOSE: 166 MG/DL (ref 70–110)
POIKILOCYTOSIS BLD QL SMEAR: SLIGHT
POLYCHROMASIA BLD QL SMEAR: ABNORMAL
POTASSIUM BLD-SCNC: 4 MMOL/L (ref 3.5–5.1)
POTASSIUM SERPL-SCNC: 4.4 MMOL/L (ref 3.5–5.1)
PROT SERPL-MCNC: 7.5 G/DL (ref 6–8.4)
RBC # BLD AUTO: 3.97 M/UL (ref 4.6–6.2)
RESPIRATORY INFECTION PANEL SOURCE: ABNORMAL
RSV RNA NPH QL NAA+NON-PROBE: NOT DETECTED
RV+EV RNA NPH QL NAA+NON-PROBE: DETECTED
SAMPLE: ABNORMAL
SARS-COV-2 RNA RESP QL NAA+PROBE: NOT DETECTED
SITE: ABNORMAL
SODIUM BLD-SCNC: 137 MMOL/L (ref 136–145)
SODIUM SERPL-SCNC: 140 MMOL/L (ref 136–145)
TOXIC GRANULES BLD QL SMEAR: PRESENT
WBC # BLD AUTO: 17.58 K/UL (ref 3.9–12.7)

## 2023-09-13 PROCEDURE — 99900035 HC TECH TIME PER 15 MIN (STAT)

## 2023-09-13 PROCEDURE — 85027 COMPLETE CBC AUTOMATED: CPT

## 2023-09-13 PROCEDURE — 20600001 HC STEP DOWN PRIVATE ROOM

## 2023-09-13 PROCEDURE — 27000221 HC OXYGEN, UP TO 24 HOURS

## 2023-09-13 PROCEDURE — 36415 COLL VENOUS BLD VENIPUNCTURE: CPT

## 2023-09-13 PROCEDURE — 25000003 PHARM REV CODE 250

## 2023-09-13 PROCEDURE — 84100 ASSAY OF PHOSPHORUS: CPT

## 2023-09-13 PROCEDURE — 85007 BL SMEAR W/DIFF WBC COUNT: CPT

## 2023-09-13 PROCEDURE — 27100171 HC OXYGEN HIGH FLOW UP TO 24 HOURS

## 2023-09-13 PROCEDURE — 87798 DETECT AGENT NOS DNA AMP: CPT | Performed by: INTERNAL MEDICINE

## 2023-09-13 PROCEDURE — 36600 WITHDRAWAL OF ARTERIAL BLOOD: CPT

## 2023-09-13 PROCEDURE — 83735 ASSAY OF MAGNESIUM: CPT

## 2023-09-13 PROCEDURE — 63600175 PHARM REV CODE 636 W HCPCS

## 2023-09-13 PROCEDURE — 82803 BLOOD GASES ANY COMBINATION: CPT

## 2023-09-13 PROCEDURE — 94761 N-INVAS EAR/PLS OXIMETRY MLT: CPT

## 2023-09-13 PROCEDURE — 94640 AIRWAY INHALATION TREATMENT: CPT

## 2023-09-13 PROCEDURE — 80053 COMPREHEN METABOLIC PANEL: CPT

## 2023-09-13 PROCEDURE — 25000242 PHARM REV CODE 250 ALT 637 W/ HCPCS

## 2023-09-13 RX ADMIN — IPRATROPIUM BROMIDE 0.5 MG: 0.5 SOLUTION RESPIRATORY (INHALATION) at 07:09

## 2023-09-13 RX ADMIN — LEVALBUTEROL 1.25 MG: 1.25 SOLUTION, CONCENTRATE RESPIRATORY (INHALATION) at 09:09

## 2023-09-13 RX ADMIN — IPRATROPIUM BROMIDE 0.5 MG: 0.5 SOLUTION RESPIRATORY (INHALATION) at 12:09

## 2023-09-13 RX ADMIN — HEPARIN SODIUM 7500 UNITS: 5000 INJECTION INTRAVENOUS; SUBCUTANEOUS at 06:09

## 2023-09-13 RX ADMIN — IPRATROPIUM BROMIDE 0.5 MG: 0.5 SOLUTION RESPIRATORY (INHALATION) at 03:09

## 2023-09-13 RX ADMIN — LEVALBUTEROL 1.25 MG: 1.25 SOLUTION, CONCENTRATE RESPIRATORY (INHALATION) at 04:09

## 2023-09-13 RX ADMIN — IPRATROPIUM BROMIDE 0.5 MG: 0.5 SOLUTION RESPIRATORY (INHALATION) at 04:09

## 2023-09-13 RX ADMIN — IPRATROPIUM BROMIDE 0.5 MG: 0.5 SOLUTION RESPIRATORY (INHALATION) at 09:09

## 2023-09-13 RX ADMIN — HEPARIN SODIUM 7500 UNITS: 5000 INJECTION INTRAVENOUS; SUBCUTANEOUS at 09:09

## 2023-09-13 RX ADMIN — CEFTRIAXONE 2 G: 2 INJECTION, POWDER, FOR SOLUTION INTRAMUSCULAR; INTRAVENOUS at 09:09

## 2023-09-13 RX ADMIN — MUPIROCIN: 20 OINTMENT TOPICAL at 11:09

## 2023-09-13 RX ADMIN — LEVALBUTEROL 1.25 MG: 1.25 SOLUTION, CONCENTRATE RESPIRATORY (INHALATION) at 12:09

## 2023-09-13 RX ADMIN — HEPARIN SODIUM 7500 UNITS: 5000 INJECTION INTRAVENOUS; SUBCUTANEOUS at 02:09

## 2023-09-13 RX ADMIN — LEVALBUTEROL 1.25 MG: 1.25 SOLUTION, CONCENTRATE RESPIRATORY (INHALATION) at 07:09

## 2023-09-13 RX ADMIN — LEVALBUTEROL 1.25 MG: 1.25 SOLUTION, CONCENTRATE RESPIRATORY (INHALATION) at 03:09

## 2023-09-13 NOTE — HOSPITAL COURSE
Patient admitted to MICU for increased work of breathing secondary to pneumonia. His legionella studies negative. Initially treated with azithromycin, cefepime, and vancomycin and narrowed to ceftriaxone and azithromycin on 9/12/23. O2 requirements improving. Patient did not tolerate BiPAP overnight. Since admission, he has had worsening LFTs with workup including liver ultrasound and hepatitis panel. He was transferred to hospital medicine 09/14. Patient's Rhinovirus came back positive in his respiratory panel. He continued to have a slow clinical response on 5L O2. 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 He completed 7 days IV antibiotics and 5 days steroids. He was weaned to 2-3L O2. Patient was seen by pulmonary and ID. No bronchoscopy was recommended at this time. He was discharged on 3L home O2 and would be driving back to [place] with his wife, where he would be seen by his PCP for medication refill and referral to pulmonary specialist.

## 2023-09-13 NOTE — PLAN OF CARE
09/13/23 1346   Discharge Reassessment   Assessment Type Discharge Planning Reassessment   Discharge Plan discussed with: Patient   Discharge Plan A Rehab  ([facility] [phone])   Discharge Plan B Home Health   DME Needed Upon Discharge  other (see comments)  (TBD)   Transition of Care Barriers None   Why the patient remains in the hospital Requires continued medical care   Post-Acute Status   Post-Acute Authorization Placement   Post-Acute Placement Status Referrals Sent   Coverage GENERIC OUT OF STATE MEDICAID - GENERIC OUT-OF-STATE MEDICAID   Hospital Resources/Appts/Education Provided Provided education on problems/symptoms using teachback   Patient choice form signed by patient/caregiver List from System Post-Acute Care   Discharge Delays None known at this time     CM met with patient/family to discuss any changes in discharge planning.   Patient and spouse agree to DC to Rehab in [place].  Barriers:  Patient is on oxygen and is not on home oxygen Discharge delays:  none  No changes in DC plans. CB:  9/15/23    Lisette Alfredo RN  Case Management  [facility]  [phone]

## 2023-09-13 NOTE — ASSESSMENT & PLAN NOTE
Body mass index is 41.94 kg/m². Morbid obesity complicates all aspects of disease management from diagnostic modalities to treatment. Weight loss encouraged and health benefits explained to patient.

## 2023-09-13 NOTE — ASSESSMENT & PLAN NOTE
Patient with acute kidney injury/acute renal failure likely due to pre-renal azotemia due to dehydration DANNI is currently stable. Baseline creatinine unknown - Labs reviewed- Renal function/electrolytes with Estimated Creatinine Clearance: 72.7 mL/min (based on SCr of 1.4 mg/dL). according to latest data. Monitor urine output and serial BMP and adjust therapy as needed. Avoid nephrotoxins and renally dose meds for GFR listed above.    -unknown baseline, likely 2/2 dehydration  -1L fluids in ED  -U/A clean, did not reflex  -denies dysuria, frequency, urgency  -Improving

## 2023-09-13 NOTE — PLAN OF CARE
Problem: Adult Inpatient Plan of Care  Goal: Plan of Care Review  Outcome: Ongoing, Progressing  Goal: Patient-Specific Goal (Individualized)  Outcome: Ongoing, Progressing  Goal: Absence of Hospital-Acquired Illness or Injury  Outcome: Ongoing, Progressing  Goal: Optimal Comfort and Wellbeing  Outcome: Ongoing, Progressing  Goal: Readiness for Transition of Care  Outcome: Ongoing, Progressing     Problem: Bariatric Environmental Safety  Goal: Safety Maintained with Care  Outcome: Ongoing, Progressing     Problem: Adjustment to Illness (Sepsis/Septic Shock)  Goal: Optimal Coping  Outcome: Ongoing, Progressing     Problem: Bleeding (Sepsis/Septic Shock)  Goal: Absence of Bleeding  Outcome: Ongoing, Progressing     Problem: Glycemic Control Impaired (Sepsis/Septic Shock)  Goal: Blood Glucose Level Within Desired Range  Outcome: Ongoing, Progressing     Problem: Infection Progression (Sepsis/Septic Shock)  Goal: Absence of Infection Signs and Symptoms  Outcome: Ongoing, Progressing     Problem: Nutrition Impaired (Sepsis/Septic Shock)  Goal: Optimal Nutrition Intake  Outcome: Ongoing, Progressing     Problem: Fluid and Electrolyte Imbalance (Acute Kidney Injury/Impairment)  Goal: Fluid and Electrolyte Balance  Outcome: Ongoing, Progressing     Problem: Oral Intake Inadequate (Acute Kidney Injury/Impairment)  Goal: Optimal Nutrition Intake  Outcome: Ongoing, Progressing     Problem: Renal Function Impairment (Acute Kidney Injury/Impairment)  Goal: Effective Renal Function  Outcome: Ongoing, Progressing     Problem: Fluid Imbalance (Pneumonia)  Goal: Fluid Balance  Outcome: Ongoing, Progressing     Problem: Infection (Pneumonia)  Goal: Resolution of Infection Signs and Symptoms  Outcome: Ongoing, Progressing     Problem: Respiratory Compromise (Pneumonia)  Goal: Effective Oxygenation and Ventilation  Outcome: Ongoing, Progressing     Problem: Infection  Goal: Absence of Infection Signs and Symptoms  Outcome: Ongoing,  Progressing

## 2023-09-13 NOTE — NURSING
Report received from GONZALES Johnson , care assumed, pt is AAO x4, resting in bed with siderails up x3, bed locked in lowest position with bed alarm set, call light in reach, safety maintained, NAD noted, POC reviewed with pt, all questions answered, pt reminded to use call light for all requests, pt verbalized understanding, will continue to monitor.

## 2023-09-13 NOTE — NURSING
Pt had an okay night maintained oxygen Sat 96%-98% on 12 Liters HFNC, had adequate UO, pt still tachypneic and blood pressure slightly elevated.No other complaints throughout the night.

## 2023-09-13 NOTE — NURSING
Pt AAO x 4, no c/o pain. Bedside telemetry monitoring in use. O2 @ 6L HFNC with oxygen saturations at 97-99%. Pt has a frequent, productive cough. Oral suction at the bedside. Pt ambulated to the bathroom x 3 with stanby assistance and oxygen tank. Pt tolerated activity well. Respiratory PCR completed this shift. Incentive spirometer at the bedside. Wife supportive at the bedside throughout the morning. Pt and wife updated on POC. Call light within reach.

## 2023-09-13 NOTE — ASSESSMENT & PLAN NOTE
This patient does have evidence of infective focus  My overall impression is sepsis.  Source: Respiratory  Antibiotics given-   Antibiotics (72h ago, onward)    Start     Stop Route Frequency Ordered    09/12/23 2100  cefTRIAXone (ROCEPHIN) 2 g in dextrose 5 % in water (D5W) 100 mL IVPB (MB+)         -- IV Every 24 hours (non-standard times) 09/12/23 1051    09/10/23 2230  mupirocin 2 % ointment         09/15/23 2059 Nasl 2 times daily 09/10/23 2123        Latest lactate reviewed-  Recent Labs   Lab 09/10/23  1440   LACTATE 1.7     Organ dysfunction indicated by Acute liver injury, Acute respiratory failure and transaminitis     Fluid challenge Actual Body weight- Patient will receive 30ml/kg actual body weight to calculate fluid bolus for treatment of septic shock.     Will Not start Pressors- Levophed for MAP of 65    Afebrile and satting on the 90s with RR in 30s on arrival  -WBC 23, RR >22, lactic 5.88 improved to 1.85 with fluids, procal 26.88  -Started on Vanc, Zosyn, Azithromycin which has been de-escalated to azithromycin and ceftriaxone  -needed BIPAP and weaned to comfort flow  -Check resp panel

## 2023-09-13 NOTE — PLAN OF CARE
09/13/23 1351   Post-Acute Status   Post-Acute Authorization Placement   Post-Acute Placement Status Referrals Sent   Coverage GENERIC OUT OF STATE MEDICAID - GENERIC OUT-OF-STATE MEDICAID   Hospital Resources/Appts/Education Provided Provided education on problems/symptoms using teachback   Discharge Delays None known at this time   Discharge Plan   Discharge Plan A Rehab   Discharge Plan B Home Health         Patient  and spouse rehab preference is   [facility]    Rehab referral sent to the following and CM e mailed clinicals to [email or web address]  [facility] #[phone]    Lisette Alfredo RN  Case Management  [facility]  [phone]

## 2023-09-13 NOTE — RESPIRATORY THERAPY
RAPID RESPONSE RESPIRATORY THERAPY PROACTIVE ROUNDING NOTE             Time of visit: 1305     Code Status: Full Code   : 1960  Bed: 84186/01999 A:   MRN: [id number]  Time spent at the bedside: < 15 min    SITUATION    Evaluated patient for: HFNC Compliance     BACKGROUND    Patient has a past medical history of Hypertension.    24 Hours Vitals Range:  Temp:  [98.2 °F (36.8 °C)-99 °F (37.2 °C)]   Pulse:  []   Resp:  [16-34]   BP: (129-158)/(67-92)   SpO2:  [90 %-100 %]     Labs:    Recent Labs     23  0313 23  0140 23  0525   * 135* 140   K 3.4* 3.9 4.4   CL 95 99 98   CO2 19* 22* 29   BUN 68* 57* 38*   CREATININE 3.2* 2.3* 1.4    128* 120*   PHOS 4.9* 2.5* 2.7   MG 2.8* 2.9* 2.7*        Recent Labs     23   PH 7.430   PCO2 53.8*   PO2 61*   HCO3 35.7*   POCSATURATED 91*   BE 11       ASSESSMENT/INTERVENTIONS    Pt resting in chair with no respiratory needs.    Last VS   Temp: 98.3 °F (36.8 °C) ( 1129)  Pulse: 79 ( 1233)  Resp: 16 ( 1233)  BP: 129/79 ( 1129)  SpO2: 100 % ( 1233)    Level of Consciousness: Level of Consciousness (AVPU): responds to voice  Respiratory Effort: Respiratory Effort: Unlabored Expansion/Accessory Muscle Usage: Expansion/Accessory Muscles/Retractions: no retractions, no use of accessory muscles  All Lung Field Breath Sounds: All Lung Fields Breath Sounds: crackles, diminished  O2 Device/Concentration: 8LPM n.C.  Was the O2 device able to be weaned? No  Ambu at bedside:      Active Orders   Respiratory Care    Incentive spirometry     Frequency: Q1H While awake     Number of Occurrences: Until Specified    Inhalation Treatment Q4H     Frequency: Q4H     Number of Occurrences: Until Specified    Inhalation Treatment Q4H PRN     Frequency: Q4H PRN     Number of Occurrences: Until Specified    Oxygen Continuous     Frequency: Continuous     Number of Occurrences: Until Specified     Order Questions:      Device type:  High flow      Device: Comfort Flow      FiO2%: 70      LPM: 35      Titrate O2 per Oxygen Titration Protocol: Yes      To maintain SpO2 goal of: >= 90%      Notify MD of: Inability to achieve desired SpO2; Sudden change in patient status and requires 20% increase in FiO2; Patient requires >60% FiO2    POCT ARTERIAL BLOOD GAS Blood Gas     Frequency: Once     Number of Occurrences: 1 Occurrences     Order Comments: Notify Physician if: see parameters below.       Order Questions:      Component: Blood Gas    POCT Venous Blood Gas     Frequency: Once     Number of Occurrences: 1 Occurrences     Order Comments: This test should be used for VBGs.  If using this order for other tests (K, creatinine, HCT, PT/INR, lactate etc)  ONLY do so in the case of an emergency or rapid response.Notify Physician if: see parameters below.         Order Questions:      Component: Blood Gas    Pulse Oximetry Q4H     Frequency: Q4H     Number of Occurrences: Until Specified       RECOMMENDATIONS    We recommend: RRT Recs: Continue POC per primary team.      FOLLOW-UP    Please call back the Rapid Response RTDago RRT at x [phone] for any questions or concerns.

## 2023-09-13 NOTE — PROGRESS NOTES
[facility] - Intensive Care ([address] Medicine  Progress Note    Patient Name: Umair Kenney  MRN: [id number]  Patient Class: IP- Inpatient   Admission Date: 9/10/2023  Length of Stay: 3 days  Attending Physician: Bigg Almanza MD  Primary Care Provider: Stacia, Primary Doctor        Subjective:     Principal Problem:Severe sepsis        HPI:  63-year-old male with pmh of HTN and hx of PNA at age 17 and 55 that presents after returning from a [employer] cruise to [place] for 1 week. Prior to leaving [place] on the cruise, he starting to feel sick. He began having fever, chills, and ?bloody productive cough after leaving. The cruise doctor treated him for URI and bronchitis with zpac/cough suppressant. Throughout the trip, his symptoms progressively worsened and began having diarrhea (10 times daily) and shortness of breath. He was seen again by the doctor this AM and found to have PNA on CXR. Complaints of orthopnea, PND, and apnea at night. Denies any sick contacts or anyone else on cruise getting sick.     Patient's BP was in the 50s/40s on arrival per EMS. He was given a 1L of fluid and satting in the low 90s on 5L NC. Due to his acidemia and RR in the 30s on arrival, he was put on BIPAP and weaned to comfort flow satting 95-96%.     ED: afebrile, -110s, WBC 23, Na 128, K 3.2, bicarb 16, anion gap 20, Cr 3.5, AST//89  Procal 26, VBG - ph 7.2, CO2 49, HCO3 20.5, lactic 5.78, point of care lactic 1.85  CXR - R lung patchy opacities > L lung    Started on IV vanc/cefepime/azithromycin and admitted to medicine.            Overview/Hospital Course:  Patient admitted to MICU for increased work of breathing secondary to pneumonia. His legionella studies are pending. Initially treated with azithromycin, cefepime, and vancomycin and narrowed to ceftriaxone and azithromycin on 9/12/23. O2 requirements improving. Patient is not tolerating BiPAP overnight. Since admission, he has had worsening LFTs  with workup including liver ultrasound and hepatitis panel.      Interval History/Significant Events: N Patient became tachypnic/hypoxic yesterday evening which improved after increased O2 to 12L. He has since been weaned down to 10L and is resting comfortably with no resp distress this AM. Denies fever/chills. Kidney function cont to improve.      Review of Systems   Constitutional:  Positive for fatigue. Negative for chills and fever.   HENT:  Negative for congestion, rhinorrhea and sore throat.    Respiratory:  Positive for cough and shortness of breath. Negative for chest tightness.    Cardiovascular:  Negative for chest pain, palpitations and leg swelling.   Gastrointestinal:  Negative for abdominal pain, blood in stool, diarrhea, nausea and vomiting.   Genitourinary:  Negative for dysuria, flank pain and hematuria.   Musculoskeletal:  Negative for back pain and neck pain.   Skin:  Negative for wound.   Neurological:  Negative for dizziness, weakness and headaches.     Objective:     Vital Signs (Most Recent):  Temp: 98.3 °F (36.8 °C) (09/13/23 1129)  Pulse: 93 (09/13/23 1156)  Resp: (!) 24 (09/13/23 1156)  BP: 129/79 (09/13/23 1129)  SpO2: 99 % (09/13/23 1156) Vital Signs (24h Range):  Temp:  [98.2 °F (36.8 °C)-99 °F (37.2 °C)] 98.3 °F (36.8 °C)  Pulse:  [] 93  Resp:  [16-34] 24  SpO2:  [89 %-100 %] 99 %  BP: (129-158)/(67-92) 129/79   Weight: 128.8 kg (284 lb)  Body mass index is 41.94 kg/m².      Intake/Output Summary (Last 24 hours) at 9/13/2023 1211  Last data filed at 9/13/2023 0833  Gross per 24 hour   Intake 1939.86 ml   Output 3750 ml   Net -1810.14 ml            Physical Exam  Vitals and nursing note reviewed.   Constitutional:       Appearance: He is obese. He is not diaphoretic.   HENT:      Head: Normocephalic and atraumatic.      Nose: Nose normal.      Comments: NC in place      Mouth/Throat:      Mouth: Mucous membranes are moist.   Eyes:      Conjunctiva/sclera: Conjunctivae normal.    Cardiovascular:      Rate and Rhythm: Normal rate and regular rhythm.      Pulses: Normal pulses.      Heart sounds: Normal heart sounds. No murmur heard.     No gallop.   Pulmonary:      Effort: No respiratory distress.      Breath sounds: Rhonchi and rales present. No wheezing.   Abdominal:      General: There is no distension.      Palpations: Abdomen is soft.      Tenderness: There is no abdominal tenderness. There is no guarding or rebound.   Musculoskeletal:      Right lower leg: No edema.      Left lower leg: No edema.   Skin:     General: Skin is warm and dry.   Neurological:      Mental Status: He is alert.            Vents:  Oxygen Concentration (%): 67 (09/10/23 1359)  Lines/Drains/Airways       Peripheral Intravenous Line  Duration                  Peripheral IV - Single Lumen 09/10/23 0901 20 G Anterior;Left Forearm 3 days         Peripheral IV - Single Lumen 09/10/23 0904 20 G Left;Posterior Hand 3 days                  Significant Labs:    CBC/Anemia Profile:  Recent Labs   Lab 09/12/23 0140 09/12/23 2049 09/13/23  0525   WBC 20.03*  --  17.58*   HGB 11.8*  --  12.4*   HCT 34.1* 35* 37.0*     --  371   MCV 91  --  93   RDW 14.7*  --  14.8*          Chemistries:  Recent Labs   Lab 09/12/23 0140 09/13/23  0525   * 140   K 3.9 4.4   CL 99 98   CO2 22* 29   BUN 57* 38*   CREATININE 2.3* 1.4   CALCIUM 8.1* 8.5*   ALBUMIN 1.8* 1.9*   PROT 6.8 7.5   BILITOT 0.8 0.6   ALKPHOS 159* 154*   * 189*   * 204*   MG 2.9* 2.7*   PHOS 2.5* 2.7         CMP:   Recent Labs   Lab 09/12/23 0140 09/13/23  0525   * 140   K 3.9 4.4   CL 99 98   CO2 22* 29   * 120*   BUN 57* 38*   CREATININE 2.3* 1.4   CALCIUM 8.1* 8.5*   PROT 6.8 7.5   ALBUMIN 1.8* 1.9*   BILITOT 0.8 0.6   ALKPHOS 159* 154*   * 204*   * 189*   ANIONGAP 14 13         Significant Imaging:  I have reviewed all pertinent imaging results/findings within the past 24 hours.    US LIVER WITH DOPPLER      CLINICAL HISTORY:   Transaminitis;     TECHNIQUE:   Ultrasound of the liver (including pancreas, liver, gallbladder, common bile duct, spleen, IVC) with color and spectral evaluation was performed.     COMPARISON:   CT chest 09/10/2023.     FINDINGS:   Liver: Enlarged, measuring 20.0 cm. Diffusely increased parenchymal echogenicity consistent with steatosis.  The hepatorenal index is 1.6.  No focal hepatic lesions.     Gallbladder: The gallbladder is surgically absent.     Biliary system: The common duct is not dilated, measuring 2 mm.  No intrahepatic ductal dilatation.     Spleen: Enlarged measuring 12.2 x 4.4 cm, with a homogeneous echotexture.     Pancreas: Partially obscured by overlying bowel gas.  The visualized portions of pancreas appear normal.     Inferior vena cava: Normal in appearance.     Miscellaneous: No ascites.     Main hepatic artery: Patent.     Main portal vein: Patent with proper directional flow.  9 mm caliber, within normal limits.     Left portal vein:  Patent with proper directional flow.     Right portal vein:  Patent with proper directional flow.     Splenic vein: Patent with proper directional flow.     SMV:  Patent with proper directional flow.     IVC: Patent     Left, middle, and right hepatic veins: Patent.     Umbilical vein: Not patent.     Collaterals: None.    Impression:       Patent portal and hepatic vasculature.     Hepatomegaly with steatosis.     Splenomegaly.     Cholecystectomy.        Results for orders placed during the hospital encounter of 09/10/23    Echo    Interpretation Summary    Left Ventricle: The left ventricle is normal in size. Normal wall thickness. Normal wall motion. There is normal systolic function with a visually estimated ejection fraction of 55 - 60%. There is normal diastolic function.    Right Ventricle: Mild right ventricular enlargement. Systolic function is mildly reduced.    Pulmonary Artery: The estimated pulmonary artery systolic  pressure is 34 mmHg.    IVC/SVC: Intermediate venous pressure at 8 mmHg.    Aorta: Aortic root is mildly dilated measuring 4.4 cm. Ascending aorta is normal.           Assessment/Plan:      * Severe sepsis  This patient does have evidence of infective focus  My overall impression is sepsis.  Source: Respiratory  Antibiotics given-   Antibiotics (72h ago, onward)    Start     Stop Route Frequency Ordered    09/12/23 2100  cefTRIAXone (ROCEPHIN) 2 g in dextrose 5 % in water (D5W) 100 mL IVPB (MB+)         -- IV Every 24 hours (non-standard times) 09/12/23 1051    09/10/23 2230  mupirocin 2 % ointment         09/15/23 2059 Nasl 2 times daily 09/10/23 2123        Latest lactate reviewed-  Recent Labs   Lab 09/10/23  1440   LACTATE 1.7     Organ dysfunction indicated by Acute liver injury, Acute respiratory failure and transaminitis     Fluid challenge Actual Body weight- Patient will receive 30ml/kg actual body weight to calculate fluid bolus for treatment of septic shock.     Will Not start Pressors- Levophed for MAP of 65    Afebrile and satting on the 90s with RR in 30s on arrival  -WBC 23, RR >22, lactic 5.88 improved to 1.85 with fluids, procal 26.88  -Started on Vanc, Zosyn, Azithromycin which has been de-escalated to azithromycin and ceftriaxone  -needed BIPAP and weaned to comfort flow  -Check resp panel    Severe obesity (BMI >= 40)  Body mass index is 41.94 kg/m². Morbid obesity complicates all aspects of disease management from diagnostic modalities to treatment. Weight loss encouraged and health benefits explained to patient.         High anion gap metabolic acidosis  Na 128, Cl 92, bicarb 16, anion 20  Lactic 5.78 on VBG and improved with POC 1.85  2/2 to PNA, dehydration, diarrhea       Hypokalemia  -repleted with oral potassium    Hyponatremia  Patient has hyponatremia which is uncontrolled,We will aim to correct the sodium by 4-6mEq in 24 hours. We will monitor sodium Daily. The hyponatremia is due to  "Dehydration/hypovolemia. We will obtain the following studies: Urine sodium, urine osmolality, serum osmolality. We will treat the hyponatremia with IV fluids. The patient's sodium results have been reviewed and are listed below.  Recent Labs   Lab 09/13/23  0525          DANNI (acute kidney injury)  Patient with acute kidney injury/acute renal failure likely due to pre-renal azotemia due to dehydration DANNI is currently stable. Baseline creatinine unknown - Labs reviewed- Renal function/electrolytes with Estimated Creatinine Clearance: 72.7 mL/min (based on SCr of 1.4 mg/dL). according to latest data. Monitor urine output and serial BMP and adjust therapy as needed. Avoid nephrotoxins and renally dose meds for GFR listed above.    -unknown baseline, likely 2/2 dehydration  -1L fluids in ED  -U/A clean, did not reflex  -denies dysuria, frequency, urgency  -Improving    Transaminitis  -Likely inflammatory response to infection, 2/2 to metabolic acidosis  -US RUQ showing hepatomegaly with steatosis, possibly MEZA  -Hepatitis panel negative and patient denies etoh use  -Cont to trend    HTN (hypertension)  Holding home amlodipine, losartan, and triamterene/HCTZ for acute infection  Resume as clinically indicated    Pneumonia  63-year-old man presenting directly from cruise ship with pneumonia treated with a Z-monique. He complains of shortness of breath, nausea, diarrhea, coughing, and fatigue.      CXR: "Patchy opacities involving the right greater left lung, concerning for infectious etiology.  Imaging follow-up to resolution in 4-6 weeks would be recommended."      Antibiotics narrowed to ceftriaxone and azithromycin  Procal 26.88 on presentation   Legionella testing pending  Sputum culture with normal respiratory luther  Nasal cannula 10 L oxygen, wean down as tolerated  Duonebs q4  BiPAP cancelled as patient does not tolerate overnight  Check resp panel      Acute respiratory failure with hypoxia and " hypercarbia  Patient with Hypercapnic and Hypoxic Respiratory failure which is Acute.  he is not on home oxygen. Supplemental oxygen was provided and noted-      Signs/symptoms of respiratory failure include- increased work of breathing, respiratory distress and wheezing. Contributing diagnoses includes - Pneumonia Labs and images were reviewed. Patient Has not had a recent ABG. Recent VBG    -Satting in low 90s on HFNC, then started started on BIPAP  -Weaned off BIPAP  -Satting 95-96% on Comfort Flow, 35L/min 80% concentration  -Cont to improve. Has been weaned to 10L   -Cont to wean as tolerated      VTE Risk Mitigation (From admission, onward)         Ordered     IP VTE HIGH RISK PATIENT  Once         09/12/23 1958     Place sequential compression device  Until discontinued         09/12/23 1958     heparin (porcine) injection 7,500 Units  Every 8 hours         09/11/23 1644                Discharge Planning   CB: 9/15/2023     Code Status: Full Code   Is the patient medically ready for discharge?: No    Reason for patient still in hospital (select all that apply): Treatment  Discharge Plan A: Home with family                  Bigg Almanza MD  Department of Hospital Medicine   [facility] - Intensive Care ([place]-[facility])

## 2023-09-13 NOTE — ASSESSMENT & PLAN NOTE
"63-year-old man presenting directly from cruise [place] with pneumonia treated with a Z-monique. He complains of shortness of breath, nausea, diarrhea, coughing, and fatigue.      CXR: "Patchy opacities involving the right greater left lung, concerning for infectious etiology.  Imaging follow-up to resolution in 4-6 weeks would be recommended."      Antibiotics narrowed to ceftriaxone and azithromycin  Procal 26.88 on presentation   Legionella testing pending  Sputum culture with normal respiratory luther  Nasal cannula 10 L oxygen, wean down as tolerated  Duonebs q4  BiPAP cancelled as patient does not tolerate overnight  Check resp panel    "

## 2023-09-13 NOTE — PLAN OF CARE
Problem: Adult Inpatient Plan of Care  Goal: Plan of Care Review  Outcome: Ongoing, Progressing  Goal: Patient-Specific Goal (Individualized)  Outcome: Ongoing, Progressing  Goal: Absence of Hospital-Acquired Illness or Injury  Outcome: Ongoing, Progressing  Goal: Optimal Comfort and Wellbeing  Outcome: Ongoing, Progressing     Problem: Glycemic Control Impaired (Sepsis/Septic Shock)  Goal: Blood Glucose Level Within Desired Range  Outcome: Ongoing, Progressing     Problem: Infection Progression (Sepsis/Septic Shock)  Goal: Absence of Infection Signs and Symptoms  Outcome: Ongoing, Progressing     Problem: Nutrition Impaired (Sepsis/Septic Shock)  Goal: Optimal Nutrition Intake  Outcome: Ongoing, Progressing     Problem: Respiratory Compromise (Pneumonia)  Goal: Effective Oxygenation and Ventilation  Outcome: Ongoing, Progressing

## 2023-09-13 NOTE — ASSESSMENT & PLAN NOTE
Holding home amlodipine, losartan, and triamterene/HCTZ for acute infection  Resume as clinically indicated

## 2023-09-13 NOTE — ASSESSMENT & PLAN NOTE
-Likely inflammatory response to infection, 2/2 to metabolic acidosis  -US RUQ showing hepatomegaly with steatosis, possibly MEZA  -Hepatitis panel negative and patient denies etoh use  -Cont to trend

## 2023-09-13 NOTE — SUBJECTIVE & OBJECTIVE
Interval History/Significant Events: N Patient became tachypnic/hypoxic yesterday evening which improved after increased O2 to 12L. He has since been weaned down to 10L and is resting comfortably with no resp distress this AM. Denies fever/chills. Kidney function cont to improve.      Review of Systems   Constitutional:  Positive for fatigue. Negative for chills and fever.   HENT:  Negative for congestion, rhinorrhea and sore throat.    Respiratory:  Positive for cough and shortness of breath. Negative for chest tightness.    Cardiovascular:  Negative for chest pain, palpitations and leg swelling.   Gastrointestinal:  Negative for abdominal pain, blood in stool, diarrhea, nausea and vomiting.   Genitourinary:  Negative for dysuria, flank pain and hematuria.   Musculoskeletal:  Negative for back pain and neck pain.   Skin:  Negative for wound.   Neurological:  Negative for dizziness, weakness and headaches.     Objective:     Vital Signs (Most Recent):  Temp: 98.3 °F (36.8 °C) (09/13/23 1129)  Pulse: 93 (09/13/23 1156)  Resp: (!) 24 (09/13/23 1156)  BP: 129/79 (09/13/23 1129)  SpO2: 99 % (09/13/23 1156) Vital Signs (24h Range):  Temp:  [98.2 °F (36.8 °C)-99 °F (37.2 °C)] 98.3 °F (36.8 °C)  Pulse:  [] 93  Resp:  [16-34] 24  SpO2:  [89 %-100 %] 99 %  BP: (129-158)/(67-92) 129/79   Weight: 128.8 kg (284 lb)  Body mass index is 41.94 kg/m².      Intake/Output Summary (Last 24 hours) at 9/13/2023 1211  Last data filed at 9/13/2023 0833  Gross per 24 hour   Intake 1939.86 ml   Output 3750 ml   Net -1810.14 ml            Physical Exam  Vitals and nursing note reviewed.   Constitutional:       Appearance: He is obese. He is not diaphoretic.   HENT:      Head: Normocephalic and atraumatic.      Nose: Nose normal.      Comments: NC in place      Mouth/Throat:      Mouth: Mucous membranes are moist.   Eyes:      Conjunctiva/sclera: Conjunctivae normal.   Cardiovascular:      Rate and Rhythm: Normal rate and regular  rhythm.      Pulses: Normal pulses.      Heart sounds: Normal heart sounds. No murmur heard.     No gallop.   Pulmonary:      Effort: No respiratory distress.      Breath sounds: Rhonchi and rales present. No wheezing.   Abdominal:      General: There is no distension.      Palpations: Abdomen is soft.      Tenderness: There is no abdominal tenderness. There is no guarding or rebound.   Musculoskeletal:      Right lower leg: No edema.      Left lower leg: No edema.   Skin:     General: Skin is warm and dry.   Neurological:      Mental Status: He is alert.            Vents:  Oxygen Concentration (%): 67 (09/10/23 1359)  Lines/Drains/Airways       Peripheral Intravenous Line  Duration                  Peripheral IV - Single Lumen 09/10/23 0901 20 G Anterior;Left Forearm 3 days         Peripheral IV - Single Lumen 09/10/23 0904 20 G Left;Posterior Hand 3 days                  Significant Labs:    CBC/Anemia Profile:  Recent Labs   Lab 09/12/23 0140 09/12/23 2049 09/13/23  0525   WBC 20.03*  --  17.58*   HGB 11.8*  --  12.4*   HCT 34.1* 35* 37.0*     --  371   MCV 91  --  93   RDW 14.7*  --  14.8*          Chemistries:  Recent Labs   Lab 09/12/23 0140 09/13/23  0525   * 140   K 3.9 4.4   CL 99 98   CO2 22* 29   BUN 57* 38*   CREATININE 2.3* 1.4   CALCIUM 8.1* 8.5*   ALBUMIN 1.8* 1.9*   PROT 6.8 7.5   BILITOT 0.8 0.6   ALKPHOS 159* 154*   * 189*   * 204*   MG 2.9* 2.7*   PHOS 2.5* 2.7         CMP:   Recent Labs   Lab 09/12/23 0140 09/13/23  0525   * 140   K 3.9 4.4   CL 99 98   CO2 22* 29   * 120*   BUN 57* 38*   CREATININE 2.3* 1.4   CALCIUM 8.1* 8.5*   PROT 6.8 7.5   ALBUMIN 1.8* 1.9*   BILITOT 0.8 0.6   ALKPHOS 159* 154*   * 204*   * 189*   ANIONGAP 14 13         Significant Imaging:  I have reviewed all pertinent imaging results/findings within the past 24 hours.    US LIVER WITH DOPPLER     CLINICAL HISTORY:   Transaminitis;     TECHNIQUE:   Ultrasound of the  liver (including pancreas, liver, gallbladder, common bile duct, spleen, IVC) with color and spectral evaluation was performed.     COMPARISON:   CT chest 09/10/2023.     FINDINGS:   Liver: Enlarged, measuring 20.0 cm. Diffusely increased parenchymal echogenicity consistent with steatosis.  The hepatorenal index is 1.6.  No focal hepatic lesions.     Gallbladder: The gallbladder is surgically absent.     Biliary system: The common duct is not dilated, measuring 2 mm.  No intrahepatic ductal dilatation.     Spleen: Enlarged measuring 12.2 x 4.4 cm, with a homogeneous echotexture.     Pancreas: Partially obscured by overlying bowel gas.  The visualized portions of pancreas appear normal.     Inferior vena cava: Normal in appearance.     Miscellaneous: No ascites.     Main hepatic artery: Patent.     Main portal vein: Patent with proper directional flow.  9 mm caliber, within normal limits.     Left portal vein:  Patent with proper directional flow.     Right portal vein:  Patent with proper directional flow.     Splenic vein: Patent with proper directional flow.     SMV:  Patent with proper directional flow.     IVC: Patent     Left, middle, and right hepatic veins: Patent.     Umbilical vein: Not patent.     Collaterals: None.    Impression:       Patent portal and hepatic vasculature.     Hepatomegaly with steatosis.     Splenomegaly.     Cholecystectomy.        Results for orders placed during the hospital encounter of 09/10/23    Echo    Interpretation Summary    Left Ventricle: The left ventricle is normal in size. Normal wall thickness. Normal wall motion. There is normal systolic function with a visually estimated ejection fraction of 55 - 60%. There is normal diastolic function.    Right Ventricle: Mild right ventricular enlargement. Systolic function is mildly reduced.    Pulmonary Artery: The estimated pulmonary artery systolic pressure is 34 mmHg.    IVC/SVC: Intermediate venous pressure at 8 mmHg.     Aorta: Aortic root is mildly dilated measuring 4.4 cm. Ascending aorta is normal.

## 2023-09-13 NOTE — ASSESSMENT & PLAN NOTE
Patient with Hypercapnic and Hypoxic Respiratory failure which is Acute.  he is not on home oxygen. Supplemental oxygen was provided and noted-      Signs/symptoms of respiratory failure include- increased work of breathing, respiratory distress and wheezing. Contributing diagnoses includes - Pneumonia Labs and images were reviewed. Patient Has not had a recent ABG. Recent VBG    -Satting in low 90s on HFNC, then started started on BIPAP  -Weaned off BIPAP  -Satting 95-96% on Comfort Flow, 35L/min 80% concentration  -Cont to improve. Has been weaned to 10L   -Cont to wean as tolerated

## 2023-09-14 LAB
ALBUMIN SERPL BCP-MCNC: 2 G/DL (ref 3.5–5.2)
ALP SERPL-CCNC: 159 U/L (ref 55–135)
ALT SERPL W/O P-5'-P-CCNC: 204 U/L (ref 10–44)
ANION GAP SERPL CALC-SCNC: 10 MMOL/L (ref 8–16)
ANISOCYTOSIS BLD QL SMEAR: SLIGHT
AST SERPL-CCNC: 188 U/L (ref 10–40)
BASOPHILS NFR BLD: 0 % (ref 0–1.9)
BILIRUB SERPL-MCNC: 0.5 MG/DL (ref 0.1–1)
BUN SERPL-MCNC: 30 MG/DL (ref 8–23)
CALCIUM SERPL-MCNC: 8.8 MG/DL (ref 8.7–10.5)
CHLORIDE SERPL-SCNC: 98 MMOL/L (ref 95–110)
CO2 SERPL-SCNC: 29 MMOL/L (ref 23–29)
CREAT SERPL-MCNC: 1.2 MG/DL (ref 0.5–1.4)
DIFFERENTIAL METHOD: ABNORMAL
DOHLE BOD BLD QL SMEAR: PRESENT
EOSINOPHIL NFR BLD: 0 % (ref 0–8)
ERYTHROCYTE [DISTWIDTH] IN BLOOD BY AUTOMATED COUNT: 15.1 % (ref 11.5–14.5)
EST. GFR  (NO RACE VARIABLE): >60 ML/MIN/1.73 M^2
GIANT PLATELETS BLD QL SMEAR: PRESENT
GLUCOSE SERPL-MCNC: 110 MG/DL (ref 70–110)
HCT VFR BLD AUTO: 35.7 % (ref 40–54)
HGB BLD-MCNC: 12.4 G/DL (ref 14–18)
HYPOCHROMIA BLD QL SMEAR: ABNORMAL
IMM GRANULOCYTES # BLD AUTO: ABNORMAL K/UL (ref 0–0.04)
IMM GRANULOCYTES NFR BLD AUTO: ABNORMAL % (ref 0–0.5)
LYMPHOCYTES NFR BLD: 14 % (ref 18–48)
MAGNESIUM SERPL-MCNC: 2.1 MG/DL (ref 1.6–2.6)
MCH RBC QN AUTO: 30.9 PG (ref 27–31)
MCHC RBC AUTO-ENTMCNC: 34.7 G/DL (ref 32–36)
MCV RBC AUTO: 89 FL (ref 82–98)
METAMYELOCYTES NFR BLD MANUAL: 6 %
MONOCYTES NFR BLD: 6 % (ref 4–15)
MYELOCYTES NFR BLD MANUAL: 4 %
NEUTROPHILS NFR BLD: 65 % (ref 38–73)
NEUTS BAND NFR BLD MANUAL: 5 %
NRBC BLD-RTO: 0 /100 WBC
OVALOCYTES BLD QL SMEAR: ABNORMAL
PHOSPHATE SERPL-MCNC: 2.5 MG/DL (ref 2.7–4.5)
PLATELET # BLD AUTO: 388 K/UL (ref 150–450)
PLATELET BLD QL SMEAR: ABNORMAL
PMV BLD AUTO: 10.4 FL (ref 9.2–12.9)
POCT GLUCOSE: 102 MG/DL (ref 70–110)
POCT GLUCOSE: 116 MG/DL (ref 70–110)
POCT GLUCOSE: 116 MG/DL (ref 70–110)
POCT GLUCOSE: 120 MG/DL (ref 70–110)
POIKILOCYTOSIS BLD QL SMEAR: SLIGHT
POLYCHROMASIA BLD QL SMEAR: ABNORMAL
POTASSIUM SERPL-SCNC: 4.5 MMOL/L (ref 3.5–5.1)
PROT SERPL-MCNC: 7.7 G/DL (ref 6–8.4)
RBC # BLD AUTO: 4.01 M/UL (ref 4.6–6.2)
SODIUM SERPL-SCNC: 137 MMOL/L (ref 136–145)
SPHEROCYTES BLD QL SMEAR: ABNORMAL
TOXIC GRANULES BLD QL SMEAR: PRESENT
WBC # BLD AUTO: 16.37 K/UL (ref 3.9–12.7)

## 2023-09-14 PROCEDURE — 20600001 HC STEP DOWN PRIVATE ROOM

## 2023-09-14 PROCEDURE — 25000242 PHARM REV CODE 250 ALT 637 W/ HCPCS

## 2023-09-14 PROCEDURE — 27000207 HC ISOLATION

## 2023-09-14 PROCEDURE — 25000003 PHARM REV CODE 250

## 2023-09-14 PROCEDURE — 99232 SBSQ HOSP IP/OBS MODERATE 35: CPT | Mod: ,,, | Performed by: INTERNAL MEDICINE

## 2023-09-14 PROCEDURE — 94640 AIRWAY INHALATION TREATMENT: CPT

## 2023-09-14 PROCEDURE — 25000003 PHARM REV CODE 250: Performed by: INTERNAL MEDICINE

## 2023-09-14 PROCEDURE — 97530 THERAPEUTIC ACTIVITIES: CPT | Mod: CO

## 2023-09-14 PROCEDURE — 99900035 HC TECH TIME PER 15 MIN (STAT)

## 2023-09-14 PROCEDURE — 83735 ASSAY OF MAGNESIUM: CPT

## 2023-09-14 PROCEDURE — 63600175 PHARM REV CODE 636 W HCPCS

## 2023-09-14 PROCEDURE — 85027 COMPLETE CBC AUTOMATED: CPT

## 2023-09-14 PROCEDURE — 36415 COLL VENOUS BLD VENIPUNCTURE: CPT

## 2023-09-14 PROCEDURE — 99232 PR SUBSEQUENT HOSPITAL CARE,LEVL II: ICD-10-PCS | Mod: ,,, | Performed by: INTERNAL MEDICINE

## 2023-09-14 PROCEDURE — 80053 COMPREHEN METABOLIC PANEL: CPT

## 2023-09-14 PROCEDURE — 84100 ASSAY OF PHOSPHORUS: CPT

## 2023-09-14 PROCEDURE — 85007 BL SMEAR W/DIFF WBC COUNT: CPT

## 2023-09-14 PROCEDURE — 27100171 HC OXYGEN HIGH FLOW UP TO 24 HOURS

## 2023-09-14 PROCEDURE — 94761 N-INVAS EAR/PLS OXIMETRY MLT: CPT

## 2023-09-14 RX ORDER — BENZONATATE 100 MG/1
200 CAPSULE ORAL 3 TIMES DAILY
Status: DISCONTINUED | OUTPATIENT
Start: 2023-09-14 | End: 2023-09-20 | Stop reason: HOSPADM

## 2023-09-14 RX ADMIN — LEVALBUTEROL 1.25 MG: 1.25 SOLUTION, CONCENTRATE RESPIRATORY (INHALATION) at 12:09

## 2023-09-14 RX ADMIN — IPRATROPIUM BROMIDE 0.5 MG: 0.5 SOLUTION RESPIRATORY (INHALATION) at 04:09

## 2023-09-14 RX ADMIN — BENZONATATE 200 MG: 100 CAPSULE ORAL at 08:09

## 2023-09-14 RX ADMIN — HEPARIN SODIUM 7500 UNITS: 5000 INJECTION INTRAVENOUS; SUBCUTANEOUS at 08:09

## 2023-09-14 RX ADMIN — HEPARIN SODIUM 7500 UNITS: 5000 INJECTION INTRAVENOUS; SUBCUTANEOUS at 06:09

## 2023-09-14 RX ADMIN — MUPIROCIN: 20 OINTMENT TOPICAL at 09:09

## 2023-09-14 RX ADMIN — CEFTRIAXONE 2 G: 2 INJECTION, POWDER, FOR SOLUTION INTRAMUSCULAR; INTRAVENOUS at 08:09

## 2023-09-14 RX ADMIN — LEVALBUTEROL 1.25 MG: 1.25 SOLUTION, CONCENTRATE RESPIRATORY (INHALATION) at 09:09

## 2023-09-14 RX ADMIN — Medication 6 MG: at 08:09

## 2023-09-14 RX ADMIN — MUPIROCIN: 20 OINTMENT TOPICAL at 08:09

## 2023-09-14 RX ADMIN — LEVALBUTEROL 1.25 MG: 1.25 SOLUTION, CONCENTRATE RESPIRATORY (INHALATION) at 04:09

## 2023-09-14 RX ADMIN — HEPARIN SODIUM 7500 UNITS: 5000 INJECTION INTRAVENOUS; SUBCUTANEOUS at 01:09

## 2023-09-14 RX ADMIN — IPRATROPIUM BROMIDE 0.5 MG: 0.5 SOLUTION RESPIRATORY (INHALATION) at 12:09

## 2023-09-14 RX ADMIN — IPRATROPIUM BROMIDE 0.5 MG: 0.5 SOLUTION RESPIRATORY (INHALATION) at 09:09

## 2023-09-14 RX ADMIN — LOPERAMIDE HYDROCHLORIDE 2 MG: 2 CAPSULE ORAL at 08:09

## 2023-09-14 NOTE — PLAN OF CARE
CLYDE spoke with Lillian Granados with [facility] in [place] and e mailed rehab referral to Lillian HUTSON at  [email or web address].  E-mail is confirmed.     Lisette Alfredo RN  Case Management  [facility]  [phone]

## 2023-09-14 NOTE — PT/OT/SLP PROGRESS
"Physical Therapy      Patient Name:  Umair Kenney   MRN:  [id number]    Patient not seen today secondary to Patient unwilling to participate, Patient fatigue. Pt "too tired" from OT session. Will follow-up next available day per POC.    "

## 2023-09-14 NOTE — NURSING
Pt is AAO x 4, no c/o pain at this time. Pt is on 6L NS with no SOB / distress noted at this time. Pt voices SOB / distress on exertion. Pt remains on continuous telemetry monitoring with HR at NSR. Pt is continent of B&B. Pt skin is clean, dry, and intact. Pt is ambulatory with 1 person assistance, pt educated to call for assistance if need, pt voiced understanding. Pt PIV's are clean, dry, and intact with no redness or swelling both are flushed and saline locked. Bed is low and locked call light in reach. Monitoring.

## 2023-09-14 NOTE — PLAN OF CARE
Problem: Adult Inpatient Plan of Care  Goal: Plan of Care Review  Outcome: Ongoing, Progressing  Goal: Patient-Specific Goal (Individualized)  Outcome: Ongoing, Progressing  Goal: Absence of Hospital-Acquired Illness or Injury  Outcome: Ongoing, Progressing  Goal: Optimal Comfort and Wellbeing  Outcome: Ongoing, Progressing  Goal: Readiness for Transition of Care  Outcome: Ongoing, Progressing     Problem: Bariatric Environmental Safety  Goal: Safety Maintained with Care  Outcome: Ongoing, Progressing     Problem: Adjustment to Illness (Sepsis/Septic Shock)  Goal: Optimal Coping  Outcome: Ongoing, Progressing     Problem: Bleeding (Sepsis/Septic Shock)  Goal: Absence of Bleeding  Outcome: Ongoing, Progressing     Problem: Glycemic Control Impaired (Sepsis/Septic Shock)  Goal: Blood Glucose Level Within Desired Range  Outcome: Ongoing, Progressing     Problem: Infection Progression (Sepsis/Septic Shock)  Goal: Absence of Infection Signs and Symptoms  Outcome: Ongoing, Progressing     Problem: Nutrition Impaired (Sepsis/Septic Shock)  Goal: Optimal Nutrition Intake  Outcome: Ongoing, Progressing     Problem: Fluid and Electrolyte Imbalance (Acute Kidney Injury/Impairment)  Goal: Fluid and Electrolyte Balance  Outcome: Ongoing, Progressing     Problem: Oral Intake Inadequate (Acute Kidney Injury/Impairment)  Goal: Optimal Nutrition Intake  Outcome: Ongoing, Progressing     Problem: Renal Function Impairment (Acute Kidney Injury/Impairment)  Goal: Effective Renal Function  Outcome: Ongoing, Progressing     Problem: Fluid Imbalance (Pneumonia)  Goal: Fluid Balance  Outcome: Ongoing, Progressing     Problem: Infection (Pneumonia)  Goal: Resolution of Infection Signs and Symptoms  Outcome: Ongoing, Progressing     Problem: Respiratory Compromise (Pneumonia)  Goal: Effective Oxygenation and Ventilation  Outcome: Ongoing, Progressing     Problem: Infection  Goal: Absence of Infection Signs and Symptoms  Outcome: Ongoing,  Progressing       Pt had no changes this shift. Pt continues on high flow NC @ 6L/min with O2 sats between 92-94, Pt resting in chair. Chair locked in place, call light in reach.

## 2023-09-14 NOTE — RESPIRATORY THERAPY
RAPID RESPONSE RESPIRATORY THERAPY PROACTIVE ROUNDING NOTE             Time of visit: 0758     Code Status: Full Code   : 1960  Bed: 89634/16830 A:   MRN: [id number]  Time spent at the bedside: < 15 min    SITUATION    Evaluated patient for: HFNC Compliance     BACKGROUND    Patient has a past medical history of Hypertension.    24 Hours Vitals Range:  Temp:  [96.2 °F (35.7 °C)-98.7 °F (37.1 °C)]   Pulse:  [69-95]   Resp:  [16-27]   BP: (129-148)/(62-86)   SpO2:  [87 %-100 %]     Labs:    Recent Labs     23  0140 23  0525 23  0419   * 140 137   K 3.9 4.4 4.5   CL 99 98 98   CO2 22* 29 29   BUN 57* 38* 30*   CREATININE 2.3* 1.4 1.2   * 120* 110   PHOS 2.5* 2.7 2.5*   MG 2.9* 2.7* 2.1        Recent Labs     23  2049 23  1427   PH 7.430 7.449   PCO2 53.8* 49.1*   PO2 61* 76*   HCO3 35.7* 34.0*   POCSATURATED 91* 95   BE 11 10       ASSESSMENT/INTERVENTIONS    Resting at the moment. No current respiratory needs    Last VS   Temp: 98.4 °F (36.9 °C) (717)  Pulse: 78 (717)  Resp: 22 (717)  BP: 134/79 (717)  SpO2: 87 % (717)    Level of Consciousness: Level of Consciousness (AVPU): alert  Respiratory Effort: Respiratory Effort: Unlabored Expansion/Accessory Muscle Usage: Expansion/Accessory Muscles/Retractions: no retractions, no use of accessory muscles  All Lung Field Breath Sounds: All Lung Fields Breath Sounds: Anterior:, Lateral:, crackles, coarse  O2 Device/Concentration: 6L  Was the O2 device able to be weaned? Yes  Ambu at bedside:      Active Orders   Respiratory Care    Incentive spirometry     Frequency: Q1H While awake     Number of Occurrences: Until Specified    Inhalation Treatment Q4H     Frequency: Q4H     Number of Occurrences: Until Specified    Inhalation Treatment Q4H PRN     Frequency: Q4H PRN     Number of Occurrences: Until Specified    Oxygen Continuous     Frequency: Continuous     Number of Occurrences: Until  Specified     Order Questions:      Device type: High flow      Device: High Flow Nasal Cannula (6 -15 Liters)      LPM: 6      Titrate O2 per Oxygen Titration Protocol: Yes      To maintain SpO2 goal of: >= 90%      Notify MD of: Inability to achieve desired SpO2; Sudden change in patient status and requires 20% increase in FiO2; Patient requires >60% FiO2    POCT ARTERIAL BLOOD GAS Blood Gas     Frequency: Once     Number of Occurrences: 1 Occurrences     Order Comments: Notify Physician if: see parameters below.       Order Questions:      Component: Blood Gas    POCT Venous Blood Gas     Frequency: Once     Number of Occurrences: 1 Occurrences     Order Comments: This test should be used for VBGs.  If using this order for other tests (K, creatinine, HCT, PT/INR, lactate etc)  ONLY do so in the case of an emergency or rapid response.Notify Physician if: see parameters below.         Order Questions:      Component: Blood Gas    Pulse Oximetry Q4H     Frequency: Q4H     Number of Occurrences: Until Specified       RECOMMENDATIONS    We recommend: RRT Recs: Continue POC per primary team.      FOLLOW-UP    Please call back the Rapid Response RT, Ale Barakat RRT at x [phone] for any questions or concerns.

## 2023-09-14 NOTE — PT/OT/SLP PROGRESS
Occupational Therapy   Treatment    Name: Umair Kenney  MRN: [id number]  Admitting Diagnosis:  Severe sepsis       Recommendations:     Discharge Recommendations: rehabilitation facility  Discharge Equipment Recommendations:  to be determined by next level of care  Barriers to discharge:  None    Assessment:     Umair Kenney is a 63 y.o. male with a medical diagnosis of Severe sepsis.  He presents with the following performance deficits affecting function are weakness, impaired endurance, impaired cardiopulmonary response to activity, impaired self care skills, impaired functional mobility.     Rehab Prognosis:  Good; patient would benefit from acute skilled OT services to address these deficits and reach maximum level of function.       Plan:     Patient to be seen 3 x/week to address the above listed problems via self-care/home management, therapeutic activities, therapeutic exercises  Plan of Care Expires:    Plan of Care Reviewed with: patient    Subjective     Chief Complaint: c/o fatigue  Patient/Family Comments/goals: get better  Pain/Comfort:  Pain Rating 1: 0/10    Objective:     Communicated with: nurse tia and OTR prior to session.  Patient found up in chair with oxygen, pulse ox (continuous), telemetry, blood pressure cuff upon OT entry to room.  A client care conference was completed by the OTR and the HUERTA prior to treatment by the HUERTA to discuss the patient's POC and current status.    General Precautions: Standard, fall, droplet    Orthopedic Precautions:N/A  Braces: N/A  Respiratory Status: Nasal cannula, flow 6 L/min     Occupational Performance:     Bed Mobility:    Patient received OOB    Functional Mobility/Transfers:  Patient completed Sit <> Stand Transfer with contact guard assistance  with  no assistive device   X3 trials from bedside chair  Functional Mobility: deferred 2/2 fatigue    Activities of Daily Living:  Lower Body Dressing: total assistance (patient reports baseline wife (A) with  B socks)      [facility] 6 Click ADL: 21    Treatment & Education:  Patient educated on OT POC, goals, importance of OOB activity, and current progress. BUE AROM in all available planes seated. Patient desaturated to SpO2 88% during activity and remained constant at SpO2 92-93% at rest. Addressed all patient questions/concerns within HUERTA scope of practice.     Patient left up in chair with all lines intact and call button in reach    GOALS:   Multidisciplinary Problems       Occupational Therapy Goals          Problem: Occupational Therapy    Goal Priority Disciplines Outcome Interventions   Occupational Therapy Goal     OT, PT/OT Ongoing, Progressing    Description: Goals to be met by: 7 days 9/19/23     Patient will increase functional independence with ADLs by performing:    Pt to complete UE dressing with set-up  Pt to complete LE dressing with SBA  Pt to complete toileting with SBA  Pt to complete standing g/h skills with SBA  Pt to complete t/f bed, chair and commode with SBA                        Time Tracking:     OT Date of Treatment: 09/14/23  OT Start Time: 1358  OT Stop Time: 1415  OT Total Time (min): 17 min    Billable Minutes:Therapeutic Activity 17    OT/JENNIFFER: JENNIFFER     Number of JENNIFFER visits since last OT visit: 1    9/14/2023

## 2023-09-14 NOTE — NURSING
Report received from Jeanine, RN, care assumed, pt is AAO x4, resting in bed with siderails up x3, bed locked in lowest position with bed alarm set, call light in reach, safety maintained, NAD noted, POC reviwed with pt, all questions answered, pt reminded to use call light for all requests, pt verbalized understanding, will continue to monitor.

## 2023-09-14 NOTE — NURSING
Pt had a better night than last night, pt now on 6L HFNC oxygen sat at 95%, pt had 2 large Bms, pt had adequate  UO, still coughing up some sputum, and still slightly tachypneic after activity, pt resting in bed quietly, safety maintained, will continue to monitor.

## 2023-09-15 PROBLEM — E87.1 HYPONATREMIA: Status: RESOLVED | Noted: 2023-09-10 | Resolved: 2023-09-15

## 2023-09-15 PROBLEM — B34.8 RHINOVIRUS: Status: ACTIVE | Noted: 2023-09-15

## 2023-09-15 PROBLEM — E87.6 HYPOKALEMIA: Status: RESOLVED | Noted: 2023-09-10 | Resolved: 2023-09-15

## 2023-09-15 PROBLEM — E87.29 HIGH ANION GAP METABOLIC ACIDOSIS: Status: RESOLVED | Noted: 2023-09-10 | Resolved: 2023-09-15

## 2023-09-15 PROBLEM — J15.9 COMMUNITY ACQUIRED BACTERIAL PNEUMONIA: Status: ACTIVE | Noted: 2023-09-10

## 2023-09-15 LAB
ALBUMIN SERPL BCP-MCNC: 2.2 G/DL (ref 3.5–5.2)
ALP SERPL-CCNC: 148 U/L (ref 55–135)
ALT SERPL W/O P-5'-P-CCNC: 189 U/L (ref 10–44)
ANION GAP SERPL CALC-SCNC: 12 MMOL/L (ref 8–16)
ANISOCYTOSIS BLD QL SMEAR: SLIGHT
AST SERPL-CCNC: 136 U/L (ref 10–40)
BACTERIA BLD CULT: NORMAL
BACTERIA BLD CULT: NORMAL
BASOPHILS # BLD AUTO: ABNORMAL K/UL (ref 0–0.2)
BASOPHILS NFR BLD: 0 % (ref 0–1.9)
BILIRUB SERPL-MCNC: 0.6 MG/DL (ref 0.1–1)
BUN SERPL-MCNC: 30 MG/DL (ref 8–23)
CALCIUM SERPL-MCNC: 9.1 MG/DL (ref 8.7–10.5)
CHLORIDE SERPL-SCNC: 99 MMOL/L (ref 95–110)
CO2 SERPL-SCNC: 27 MMOL/L (ref 23–29)
CREAT SERPL-MCNC: 1.2 MG/DL (ref 0.5–1.4)
DIFFERENTIAL METHOD: ABNORMAL
EOSINOPHIL # BLD AUTO: ABNORMAL K/UL (ref 0–0.5)
EOSINOPHIL NFR BLD: 1 % (ref 0–8)
ERYTHROCYTE [DISTWIDTH] IN BLOOD BY AUTOMATED COUNT: 15.2 % (ref 11.5–14.5)
EST. GFR  (NO RACE VARIABLE): >60 ML/MIN/1.73 M^2
GLUCOSE SERPL-MCNC: 96 MG/DL (ref 70–110)
HCT VFR BLD AUTO: 37.3 % (ref 40–54)
HGB BLD-MCNC: 12.7 G/DL (ref 14–18)
IMM GRANULOCYTES # BLD AUTO: ABNORMAL K/UL (ref 0–0.04)
IMM GRANULOCYTES NFR BLD AUTO: ABNORMAL % (ref 0–0.5)
LYMPHOCYTES # BLD AUTO: ABNORMAL K/UL (ref 1–4.8)
LYMPHOCYTES NFR BLD: 24 % (ref 18–48)
MAGNESIUM SERPL-MCNC: 1.8 MG/DL (ref 1.6–2.6)
MCH RBC QN AUTO: 31.1 PG (ref 27–31)
MCHC RBC AUTO-ENTMCNC: 34 G/DL (ref 32–36)
MCV RBC AUTO: 91 FL (ref 82–98)
METAMYELOCYTES NFR BLD MANUAL: 5 %
MONOCYTES # BLD AUTO: ABNORMAL K/UL (ref 0.3–1)
MONOCYTES NFR BLD: 6 % (ref 4–15)
MYELOCYTES NFR BLD MANUAL: 6 %
NEUTROPHILS NFR BLD: 54 % (ref 38–73)
NEUTS BAND NFR BLD MANUAL: 4 %
NRBC BLD-RTO: 0 /100 WBC
PHOSPHATE SERPL-MCNC: 3.1 MG/DL (ref 2.7–4.5)
PLATELET # BLD AUTO: 449 K/UL (ref 150–450)
PLATELET BLD QL SMEAR: ABNORMAL
PMV BLD AUTO: 10.3 FL (ref 9.2–12.9)
POCT GLUCOSE: 132 MG/DL (ref 70–110)
POCT GLUCOSE: 143 MG/DL (ref 70–110)
POCT GLUCOSE: 160 MG/DL (ref 70–110)
POCT GLUCOSE: 203 MG/DL (ref 70–110)
POTASSIUM SERPL-SCNC: 4.6 MMOL/L (ref 3.5–5.1)
PROT SERPL-MCNC: 8 G/DL (ref 6–8.4)
RBC # BLD AUTO: 4.09 M/UL (ref 4.6–6.2)
SODIUM SERPL-SCNC: 138 MMOL/L (ref 136–145)
TOXIC GRANULES BLD QL SMEAR: PRESENT
WBC # BLD AUTO: 14.46 K/UL (ref 3.9–12.7)

## 2023-09-15 PROCEDURE — 25000003 PHARM REV CODE 250

## 2023-09-15 PROCEDURE — 25500020 PHARM REV CODE 255: Performed by: INTERNAL MEDICINE

## 2023-09-15 PROCEDURE — 80053 COMPREHEN METABOLIC PANEL: CPT

## 2023-09-15 PROCEDURE — 63600175 PHARM REV CODE 636 W HCPCS: Performed by: INTERNAL MEDICINE

## 2023-09-15 PROCEDURE — 94640 AIRWAY INHALATION TREATMENT: CPT

## 2023-09-15 PROCEDURE — 85007 BL SMEAR W/DIFF WBC COUNT: CPT

## 2023-09-15 PROCEDURE — 27000221 HC OXYGEN, UP TO 24 HOURS

## 2023-09-15 PROCEDURE — 63600175 PHARM REV CODE 636 W HCPCS

## 2023-09-15 PROCEDURE — 27000207 HC ISOLATION

## 2023-09-15 PROCEDURE — 84100 ASSAY OF PHOSPHORUS: CPT

## 2023-09-15 PROCEDURE — 99900035 HC TECH TIME PER 15 MIN (STAT)

## 2023-09-15 PROCEDURE — 85027 COMPLETE CBC AUTOMATED: CPT

## 2023-09-15 PROCEDURE — 36415 COLL VENOUS BLD VENIPUNCTURE: CPT

## 2023-09-15 PROCEDURE — 83735 ASSAY OF MAGNESIUM: CPT

## 2023-09-15 PROCEDURE — 94761 N-INVAS EAR/PLS OXIMETRY MLT: CPT

## 2023-09-15 PROCEDURE — 99233 PR SUBSEQUENT HOSPITAL CARE,LEVL III: ICD-10-PCS | Mod: ,,, | Performed by: INTERNAL MEDICINE

## 2023-09-15 PROCEDURE — 99233 SBSQ HOSP IP/OBS HIGH 50: CPT | Mod: ,,, | Performed by: INTERNAL MEDICINE

## 2023-09-15 PROCEDURE — 27100171 HC OXYGEN HIGH FLOW UP TO 24 HOURS

## 2023-09-15 PROCEDURE — 25000242 PHARM REV CODE 250 ALT 637 W/ HCPCS

## 2023-09-15 PROCEDURE — 25000003 PHARM REV CODE 250: Performed by: INTERNAL MEDICINE

## 2023-09-15 PROCEDURE — 20600001 HC STEP DOWN PRIVATE ROOM

## 2023-09-15 RX ORDER — METHYLPREDNISOLONE SOD SUCC 125 MG
125 VIAL (EA) INJECTION ONCE
Status: COMPLETED | OUTPATIENT
Start: 2023-09-15 | End: 2023-09-15

## 2023-09-15 RX ORDER — PREDNISONE 20 MG/1
40 TABLET ORAL DAILY
Status: COMPLETED | OUTPATIENT
Start: 2023-09-15 | End: 2023-09-19

## 2023-09-15 RX ADMIN — BENZONATATE 200 MG: 100 CAPSULE ORAL at 03:09

## 2023-09-15 RX ADMIN — LEVALBUTEROL 1.25 MG: 1.25 SOLUTION, CONCENTRATE RESPIRATORY (INHALATION) at 11:09

## 2023-09-15 RX ADMIN — LEVALBUTEROL 1.25 MG: 1.25 SOLUTION, CONCENTRATE RESPIRATORY (INHALATION) at 08:09

## 2023-09-15 RX ADMIN — CEFTRIAXONE 2 G: 2 INJECTION, POWDER, FOR SOLUTION INTRAMUSCULAR; INTRAVENOUS at 08:09

## 2023-09-15 RX ADMIN — IOHEXOL 100 ML: 350 INJECTION, SOLUTION INTRAVENOUS at 01:09

## 2023-09-15 RX ADMIN — HEPARIN SODIUM 7500 UNITS: 5000 INJECTION INTRAVENOUS; SUBCUTANEOUS at 10:09

## 2023-09-15 RX ADMIN — PREDNISONE 40 MG: 20 TABLET ORAL at 08:09

## 2023-09-15 RX ADMIN — IPRATROPIUM BROMIDE 0.5 MG: 0.5 SOLUTION RESPIRATORY (INHALATION) at 11:09

## 2023-09-15 RX ADMIN — LEVALBUTEROL 1.25 MG: 1.25 SOLUTION, CONCENTRATE RESPIRATORY (INHALATION) at 05:09

## 2023-09-15 RX ADMIN — LEVALBUTEROL 1.25 MG: 1.25 SOLUTION, CONCENTRATE RESPIRATORY (INHALATION) at 01:09

## 2023-09-15 RX ADMIN — BENZONATATE 200 MG: 100 CAPSULE ORAL at 08:09

## 2023-09-15 RX ADMIN — INSULIN ASPART 2 UNITS: 100 INJECTION, SOLUTION INTRAVENOUS; SUBCUTANEOUS at 05:09

## 2023-09-15 RX ADMIN — IPRATROPIUM BROMIDE 0.5 MG: 0.5 SOLUTION RESPIRATORY (INHALATION) at 08:09

## 2023-09-15 RX ADMIN — BENZONATATE 200 MG: 100 CAPSULE ORAL at 09:09

## 2023-09-15 RX ADMIN — IPRATROPIUM BROMIDE 0.5 MG: 0.5 SOLUTION RESPIRATORY (INHALATION) at 12:09

## 2023-09-15 RX ADMIN — IPRATROPIUM BROMIDE 0.5 MG: 0.5 SOLUTION RESPIRATORY (INHALATION) at 01:09

## 2023-09-15 RX ADMIN — HEPARIN SODIUM 7500 UNITS: 5000 INJECTION INTRAVENOUS; SUBCUTANEOUS at 01:09

## 2023-09-15 RX ADMIN — LEVALBUTEROL 1.25 MG: 1.25 SOLUTION, CONCENTRATE RESPIRATORY (INHALATION) at 12:09

## 2023-09-15 RX ADMIN — METHYLPREDNISOLONE SODIUM SUCCINATE 125 MG: 125 INJECTION, POWDER, FOR SOLUTION INTRAMUSCULAR; INTRAVENOUS at 09:09

## 2023-09-15 RX ADMIN — LEVALBUTEROL 1.25 MG: 1.25 SOLUTION, CONCENTRATE RESPIRATORY (INHALATION) at 03:09

## 2023-09-15 RX ADMIN — IPRATROPIUM BROMIDE 0.5 MG: 0.5 SOLUTION RESPIRATORY (INHALATION) at 03:09

## 2023-09-15 RX ADMIN — HEPARIN SODIUM 7500 UNITS: 5000 INJECTION INTRAVENOUS; SUBCUTANEOUS at 06:09

## 2023-09-15 RX ADMIN — IPRATROPIUM BROMIDE 0.5 MG: 0.5 SOLUTION RESPIRATORY (INHALATION) at 05:09

## 2023-09-15 NOTE — ASSESSMENT & PLAN NOTE
Patient with Hypercapnic and Hypoxic Respiratory failure which is Acute.  he is not on home oxygen. Supplemental oxygen was provided and noted-      Signs/symptoms of respiratory failure include- increased work of breathing, respiratory distress and wheezing. Contributing diagnoses includes     -Satting in low 90s on HFNC, then started started on BIPAP  -Weaned off BIPAP  -Satting 95-96% on 5L  -Cont to improve  -Cont to wean as tolerated

## 2023-09-15 NOTE — PLAN OF CARE
CM sent an email to [email or web address] to check on rehab acceptance and request a telephone contact phone number. CM following.    09/15/23 0904   Post-Acute Status   Post-Acute Authorization Placement   Post-Acute Placement Status Referrals Sent

## 2023-09-15 NOTE — PT/OT/SLP PROGRESS
Occupational Therapy      Patient Name:  Umair Kenney   MRN:  [id number]    Patient not seen today secondary to pt. Went for test to rule out PE.    9/15/2023

## 2023-09-15 NOTE — RESPIRATORY THERAPY
RAPID RESPONSE RESPIRATORY THERAPY FOLLOW-UP NOTE       Followed up with patient for proactive rounding. Patient 94% on 6LHFNC.  Please call Rapid Response RT, Anyi Brambila, RRT at [phone] with any questions or concerns.

## 2023-09-15 NOTE — PATIENT CARE CONFERENCE
[id number] KOFFI Kenney  :1960     AGE:63 y.o.     SEX:male      STATUS:Full Code      ISOLATION:Droplet   ALLERGIES:Patient has no known allergies.   ADMIT DATE:  9/10/2023   PHYSICIAN:  Osmin Zambrano MD   DIAGNOSIS: Lobar pneumonia [J18.1]  Hypoxia [R09.02]  Volume overload [E87.70]  DANNI (acute kidney injury) [N17.9]  Chest pain [R07.9]  Sepsis, due to unspecified organism, unspecified whether acute organ dysfunction present [A41.9] Severe sepsis  CC: Shortness of Breath (Coming from [facility] for Shortness of Breath. Dx with pneumonia on cruising ship)    CONSULTS: None  Past Medical History:   Diagnosis Date    Hypertension      Scheduled procedure(s): No  Labs to Monitor (no values):   Recent Vitals:  Temp: 97.9 °F (36.6 °C)  Pulse: 74  Resp: 18  SpO2: (!) 94 %  BP: 134/84    Respiratory:    Cardiac: Rhythm: normal sinus rhythm      Wt Readings from Last 2 Encounters:   23 128.8 kg (284 lb)     GI/: Diet Adult Regular (IDDSI Level 7)   Last Bowel Movement: 23    Neuro: WDL  Hamilton catheter: No  Skin:WDL   Mao Score: 20      Lines/Drains/Airways       Peripheral Intravenous Line  Duration                  Peripheral IV - Single Lumen 09/10/23 0901 20 G Anterior;Left Forearm 4 days         Peripheral IV - Single Lumen 09/10/23 0904 20 G Left;Posterior Hand 4 days                  Antibiotics (From admission, onward)      Start     Stop Route Frequency Ordered    23 2100  cefTRIAXone (ROCEPHIN) 2 g in dextrose 5 % in water (D5W) 100 mL IVPB (MB+)         -- IV Every 24 hours (non-standard times) 23 1051    09/10/23 223  mupirocin 2 % ointment         09/15/23 2059 Nasl 2 times daily 09/10/23 2123          VTE Risk Mitigation (From admission, onward)           Ordered     IP VTE HIGH RISK PATIENT  Once         23     Place sequential compression device  Until discontinued         23     heparin (porcine) injection 7,500 Units  Every 8 hours          09/11/23 1644                  Glycemic control:  Recent Labs   Lab 09/14/23  0716 09/14/23  1124 09/14/23  1601 09/14/23 2025   POCTGLUCOSE 116* 116* 102 120*     Fall risk: standard interventions  Mobility: GEMS ([place] Early Mobility Scale): Level 3-Standing activities with assist    Nursing Update/Plan of Care: Pt slept in his chair over night. A&Ox4. VSS. No reports of pain. Imodium given PRN for diarrhea. No Bms throughout shift. 6 L high flow in place. Safety precautions in place. Call light in reach. No further concerns noted at this time.

## 2023-09-15 NOTE — ASSESSMENT & PLAN NOTE
"63-year-old man presenting directly [place] cruise [place] with pneumonia treated with a Z-monique. He complains of shortness of breath, nausea, diarrhea, coughing, and fatigue.      CXR: "Patchy opacities involving the right greater left lung, concerning for infectious etiology.  Imaging follow-up to resolution in 4-6 weeks would be recommended."      Antibiotics narrowed to ceftriaxone   Procal 26.88 on presentation   Legionella testing pending  Sputum culture with normal respiratory luther  Nasal cannula 5L oxygen, wean down as tolerated  Gonsalo q4    "

## 2023-09-15 NOTE — NURSING
Pt is now on 10L high flow NC O2 sat 89-91, with no exertion resting in chair. MD, OC, RR, and CN aware. Awaiting portable chest x-ray ordered by PA. Monitoring

## 2023-09-15 NOTE — PT/OT/SLP PROGRESS
Physical Therapy      Patient Name:  Umair Kenney   MRN:  [id number]    Patient not seen today secondary to pt up in chair, nsg. Reports pt is waiting to go to testing, possible PE. Will follow-up in PM.  Ret'd at 2:20 pm, waiting on results of test. Pt still sitting up in chair. Will cont in AM.

## 2023-09-15 NOTE — ASSESSMENT & PLAN NOTE
Patient with acute kidney injury/acute renal failure likely due to pre-renal azotemia due to dehydration DANNI is currently stable. Baseline creatinine unknown - Labs reviewed- Renal function/electrolytes with Estimated Creatinine Clearance: 84.8 mL/min (based on SCr of 1.2 mg/dL). according to latest data. Monitor urine output and serial BMP and adjust therapy as needed. Avoid nephrotoxins and renally dose meds for GFR listed above.

## 2023-09-15 NOTE — SUBJECTIVE & OBJECTIVE
Interval History: WBC 16k. Still on 5L O2 and he desaturates with lowered oxygen.    Review of Systems   Constitutional:  Positive for fatigue. Negative for chills and fever.   Respiratory:  Positive for cough, shortness of breath and wheezing.    Cardiovascular:  Negative for leg swelling.   Gastrointestinal:  Negative for abdominal pain, diarrhea, nausea and vomiting.   Genitourinary:  Negative for dysuria.   Musculoskeletal:  Negative for back pain.   Skin:  Negative for rash.   Neurological:  Negative for headaches.   Psychiatric/Behavioral:  Negative for confusion.      Objective:     Vital Signs (Most Recent):  Temp: 98.1 °F (36.7 °C) (09/15/23 1504)  Pulse: 87 (09/15/23 1529)  Resp: (!) 24 (09/15/23 1509)  BP: (!) 155/85 (09/15/23 1504)  SpO2: 95 % (09/15/23 1529) Vital Signs (24h Range):  Temp:  [97.9 °F (36.6 °C)-98.4 °F (36.9 °C)] 98.1 °F (36.7 °C)  Pulse:  [] 87  Resp:  [18-29] 24  SpO2:  [87 %-98 %] 95 %  BP: (134-178)/() 155/85     Weight: 128.8 kg (284 lb)  Body mass index is 41.94 kg/m².    Intake/Output Summary (Last 24 hours) at 9/15/2023 1848  Last data filed at 9/15/2023 1705  Gross per 24 hour   Intake 1142.44 ml   Output 1225 ml   Net -82.56 ml         Physical Exam  Constitutional:       Appearance: He is ill-appearing.   HENT:      Head: Normocephalic and atraumatic.   Eyes:      Extraocular Movements: Extraocular movements intact.      Pupils: Pupils are equal, round, and reactive to light.   Cardiovascular:      Rate and Rhythm: Regular rhythm.   Pulmonary:      Comments: crackles  Abdominal:      General: There is no distension.      Palpations: Abdomen is soft.      Tenderness: There is no abdominal tenderness.   Musculoskeletal:      Cervical back: Normal range of motion and neck supple.   Neurological:      General: No focal deficit present.      Mental Status: He is oriented to person, place, and time.             Significant Labs: All pertinent labs within the past 24 hours  have been reviewed.  BMP:   Recent Labs   Lab 09/15/23  0509   GLU 96      K 4.6   CL 99   CO2 27   BUN 30*   CREATININE 1.2   CALCIUM 9.1   MG 1.8     CBC:   Recent Labs   Lab 09/14/23  0419 09/15/23  0509   WBC 16.37* 14.46*   HGB 12.4* 12.7*   HCT 35.7* 37.3*    449       Significant Imaging: I have reviewed all pertinent imaging results/findings within the past 24 hours.

## 2023-09-15 NOTE — NURSING
Pt is remains AAO x 4. O2 at 8L HF NC, O2 sats between 90-95%, pt SOB with activity. Pt had chest x-ray and CT done this shift. Pt had room change to be closer to front of unit for closer observation. Pt has remained in chair at bedside. Pt started on steroid treatment today and is tolerating well. Droplet precautions maintained. Pt continues on telemetry monitoring. Chair is locked in place. Call light in reach.

## 2023-09-15 NOTE — RESPIRATORY THERAPY
RAPID RESPONSE RESPIRATORY THERAPY PROACTIVE ROUNDING NOTE             Time of visit: 08     Code Status: Full Code   : 1960  Bed: 02037/94178 A:   MRN: [id number]  Time spent at the bedside: < 15 min    SITUATION    Evaluated patient for: HFNC Compliance     BACKGROUND    Patient has a past medical history of Hypertension.    24 Hours Vitals Range:  Temp:  [97.9 °F (36.6 °C)-98.4 °F (36.9 °C)]   Pulse:  []   Resp:  [16-23]   BP: (132-155)/(71-85)   SpO2:  [88 %-98 %]     Labs:    Recent Labs     23  0525 23  0419 09/15/23  0509    137 138   K 4.4 4.5 4.6   CL 98 98 99   CO2 29 29 27   BUN 38* 30* 30*   CREATININE 1.4 1.2 1.2   * 110 96   PHOS 2.7 2.5* 3.1   MG 2.7* 2.1 1.8        Recent Labs     23  2049 23  1427   PH 7.430 7.449   PCO2 53.8* 49.1*   PO2 61* 76*   HCO3 35.7* 34.0*   POCSATURATED 91* 95   BE 11 10       ASSESSMENT/INTERVENTIONS    Alerted by RNNavjot, that patient's O2 requirements have increased from 6L to 9L. Patient currently on 9L HFNC with O2 SAT'S 93-95%. Patient resting comfortably in chair. No respiratory concerns at this time, but will continue to monitor patient's O2 requirements throughout the day.    Last VS   Temp: 98.4 °F (36.9 °C) (09/15 0724)  Pulse: 108 (09/15 0850)  Resp: 20 (09/15 0850)  BP: 137/71 (09/15 0724)  SpO2: 93 % (09/15 0850)    Level of Consciousness: Level of Consciousness (AVPU): alert  Respiratory Effort: Respiratory Effort: Normal, Unlabored Expansion/Accessory Muscle Usage: Expansion/Accessory Muscles/Retractions: expansion symmetric  All Lung Field Breath Sounds: All Lung Fields Breath Sounds: Anterior:, Lateral:, coarse  COLTEN Breath Sounds: clear  LLL Breath Sounds: diminished  RUL Breath Sounds: clear  RML Breath Sounds: diminished  RLL Breath Sounds: diminished  O2 Device/Concentration: 9L HFNC  Was the O2 device able to be weaned? No  Ambu at bedside:      Active Orders   Respiratory Care    Incentive spirometry      Frequency: Q1H While awake     Number of Occurrences: Until Specified    Inhalation Treatment Q4H     Frequency: Q4H     Number of Occurrences: Until Specified    Inhalation Treatment Q4H PRN     Frequency: Q4H PRN     Number of Occurrences: Until Specified    Oxygen Continuous     Frequency: Continuous     Number of Occurrences: Until Specified     Order Questions:      Device type: High flow      Device: High Flow Nasal Cannula (6 -15 Liters)      LPM: 6      Titrate O2 per Oxygen Titration Protocol: Yes      To maintain SpO2 goal of: >= 90%      Notify MD of: Inability to achieve desired SpO2; Sudden change in patient status and requires 20% increase in FiO2; Patient requires >60% FiO2    POCT ARTERIAL BLOOD GAS Blood Gas     Frequency: Once     Number of Occurrences: 1 Occurrences     Order Comments: Notify Physician if: see parameters below.       Order Questions:      Component: Blood Gas    POCT Venous Blood Gas     Frequency: Once     Number of Occurrences: 1 Occurrences     Order Comments: This test should be used for VBGs.  If using this order for other tests (K, creatinine, HCT, PT/INR, lactate etc)  ONLY do so in the case of an emergency or rapid response.Notify Physician if: see parameters below.         Order Questions:      Component: Blood Gas    Pulse Oximetry Q4H     Frequency: Q4H     Number of Occurrences: Until Specified       RECOMMENDATIONS    We recommend: RRT Recs: Continue POC per primary team. RN to call or message with any concerns.      FOLLOW-UP    Please call back the Rapid Response RT, Anyi Brambila RRT at x [phone] for any questions or concerns.

## 2023-09-15 NOTE — ASSESSMENT & PLAN NOTE
This patient does have evidence of infective focus  My overall impression is sepsis.  Source: Respiratory  Antibiotics given-   Antibiotics (72h ago, onward)    Start     Stop Route Frequency Ordered    09/12/23 2100  cefTRIAXone (ROCEPHIN) 2 g in dextrose 5 % in water (D5W) 100 mL IVPB (MB+)         -- IV Every 24 hours (non-standard times) 09/12/23 1051    09/10/23 2230  mupirocin 2 % ointment         09/15/23 2059 Nasl 2 times daily 09/10/23 2123        Organ dysfunction indicated by Acute liver injury, Acute respiratory failure and transaminitis     Fluid challenge Actual Body weight- Patient will receive 30ml/kg actual body weight to calculate fluid bolus for treatment of septic shock.     Will Not start Pressors- Levophed for MAP of 65    Afebrile and satting on the 90s with RR in 30s on arrival  -WBC 23, RR >22, lactic 5.88 improved to 1.85 with fluids, procal 26.88  -Antibiotics have been de-escalated to ceftriaxone  -needed BIPAP and weaned to comfort flow

## 2023-09-15 NOTE — PROGRESS NOTES
[facility] - Intensive Care ([place] [address])  [facility] Medicine  Progress Note    Patient Name: Umair Kenney  MRN: [id number]  Patient Class: IP- Inpatient   Admission Date: 9/10/2023  Length of Stay: 4 days  Attending Physician: Osmin Zambrano MD  Primary Care Provider: Stacia, Primary Doctor        Subjective:     Principal Problem:Severe sepsis        HPI:  63-year-old male with pmh of HTN and hx of PNA at age 17 and 55 that presents after returning from a [place] cruise to [place] for 1 week. Prior to leaving [place] on the cruise, he starting to feel sick. He began having fever, chills, and ?bloody productive cough after leaving. The cruise doctor treated him for URI and bronchitis with zpac/cough suppressant. Throughout the trip, his symptoms progressively worsened and began having diarrhea (10 times daily) and shortness of breath. He was seen again by the doctor this AM and found to have PNA on CXR. Complaints of orthopnea, PND, and apnea at night. Denies any sick contacts or anyone else on cruise getting sick.     Patient's BP was in the 50s/40s on arrival per EMS. He was given a 1L of fluid and satting in the low 90s on 5L NC. Due to his acidemia and RR in the 30s on arrival, he was put on BIPAP and weaned to comfort flow satting 95-96%.     ED: afebrile, -110s, WBC 23, Na 128, K 3.2, bicarb 16, anion gap 20, Cr 3.5, AST//89  Procal 26, VBG - ph 7.2, CO2 49, HCO3 20.5, lactic 5.78, point of care lactic 1.85  CXR - R lung patchy opacities > L lung    Started on IV vanc/cefepime/azithromycin and admitted to medicine.            Overview/Hospital Course:  Patient admitted to MICU for increased work of breathing secondary to pneumonia. His legionella studies are pending. Initially treated with azithromycin, cefepime, and vancomycin and narrowed to ceftriaxone and azithromycin on 9/12/23. O2 requirements improving. Patient is not tolerating BiPAP overnight. Since admission, he has had worsening  LFTs with workup including liver ultrasound and hepatitis panel.      Interval History: Weaned to 5L 02 with continued cough and dyspnea.     Review of Systems   Constitutional:  Positive for fatigue. Negative for chills and fever.   HENT:  Negative for congestion, rhinorrhea and sore throat.    Respiratory:  Positive for cough, shortness of breath and wheezing. Negative for chest tightness.    Cardiovascular:  Negative for chest pain, palpitations and leg swelling.   Gastrointestinal:  Negative for abdominal pain, blood in stool, diarrhea, nausea and vomiting.   Genitourinary:  Negative for dysuria, flank pain and hematuria.   Musculoskeletal:  Negative for back pain and neck pain.   Skin:  Negative for wound.   Neurological:  Negative for dizziness, weakness and headaches.   Psychiatric/Behavioral:  Negative for confusion.      Objective:     Vital Signs (Most Recent):  Temp: 98.1 °F (36.7 °C) (09/14/23 1601)  Pulse: 82 (09/14/23 1602)  Resp: 16 (09/14/23 1602)  BP: (!) 155/85 (09/14/23 1601)  SpO2: 98 % (09/14/23 1602) Vital Signs (24h Range):  Temp:  [97.7 °F (36.5 °C)-98.7 °F (37.1 °C)] 98.1 °F (36.7 °C)  Pulse:  [69-98] 82  Resp:  [16-27] 16  SpO2:  [87 %-98 %] 98 %  BP: (132-155)/(79-86) 155/85     Weight: 128.8 kg (284 lb)  Body mass index is 41.94 kg/m².    Intake/Output Summary (Last 24 hours) at 9/14/2023 1940  Last data filed at 9/14/2023 1751  Gross per 24 hour   Intake 2400 ml   Output 2700 ml   Net -300 ml         Physical Exam  Vitals and nursing note reviewed.   Constitutional:       Appearance: He is obese. He is ill-appearing.   HENT:      Head: Normocephalic and atraumatic.      Nose: Nose normal.      Comments: NC in place      Mouth/Throat:      Mouth: Mucous membranes are moist.   Eyes:      Conjunctiva/sclera: Conjunctivae normal.   Cardiovascular:      Rate and Rhythm: Normal rate and regular rhythm.      Pulses: Normal pulses.      Heart sounds: Normal heart sounds. No murmur heard.     No  gallop.   Pulmonary:      Effort: No respiratory distress.      Breath sounds: Wheezing present. No rales.      Comments: Diminished breathe sounds   Abdominal:      General: There is no distension.      Palpations: Abdomen is soft.      Tenderness: There is no abdominal tenderness. There is no guarding or rebound.   Musculoskeletal:      Cervical back: Normal range of motion and neck supple.      Right lower leg: No edema.      Left lower leg: No edema.   Skin:     General: Skin is warm and dry.   Neurological:      General: No focal deficit present.      Mental Status: He is alert and oriented to person, place, and time.             Significant Labs: All pertinent labs within the past 24 hours have been reviewed.  BMP:   Recent Labs   Lab 09/14/23  0419         K 4.5   CL 98   CO2 29   BUN 30*   CREATININE 1.2   CALCIUM 8.8   MG 2.1     CBC:   Recent Labs   Lab 09/13/23  0525 09/13/23  1427 09/14/23  0419   WBC 17.58*  --  16.37*   HGB 12.4*  --  12.4*   HCT 37.0* 39 35.7*     --  388       Significant Imaging: I have reviewed all pertinent imaging results/findings within the past 24 hours.      Assessment/Plan:      * Severe sepsis  This patient does have evidence of infective focus  My overall impression is sepsis.  Source: Respiratory  Antibiotics given-   Antibiotics (72h ago, onward)    Start     Stop Route Frequency Ordered    09/12/23 2100  cefTRIAXone (ROCEPHIN) 2 g in dextrose 5 % in water (D5W) 100 mL IVPB (MB+)         -- IV Every 24 hours (non-standard times) 09/12/23 1051    09/10/23 2230  mupirocin 2 % ointment         09/15/23 2059 Nasl 2 times daily 09/10/23 2123        Organ dysfunction indicated by Acute liver injury, Acute respiratory failure and transaminitis     Fluid challenge Actual Body weight- Patient will receive 30ml/kg actual body weight to calculate fluid bolus for treatment of septic shock.     Will Not start Pressors- Levophed for MAP of 65    Afebrile and satting  on the 90s with RR in 30s on arrival  -WBC 23, RR >22, lactic 5.88 improved to 1.85 with fluids, procal 26.88  -Antibiotics have been de-escalated to ceftriaxone  -needed BIPAP and weaned to comfort flow    Severe obesity (BMI >= 40)  Body mass index is 41.94 kg/m². Morbid obesity complicates all aspects of disease management from diagnostic modalities to treatment. Weight loss encouraged and health benefits explained to patient.         High anion gap metabolic acidosis  Na 128, Cl 92, bicarb 16, anion 20  Lactic 5.78 on VBG and improved with POC 1.85  2/2 to PNA, dehydration, diarrhea       Hypokalemia  -repleted with oral potassium    Hyponatremia  Patient has hyponatremia which is uncontrolled,We will aim to correct the sodium by 4-6mEq in 24 hours. We will monitor sodium Daily. The hyponatremia is due to Dehydration/hypovolemia. We will obtain the following studies: Urine sodium, urine osmolality, serum osmolality. We will treat the hyponatremia with IV fluids. The patient's sodium results have been reviewed and are listed below.  Recent Labs   Lab 09/13/23  0525          DANNI (acute kidney injury)  Patient with acute kidney injury/acute renal failure likely due to pre-renal azotemia due to dehydration DANNI is currently stable. Baseline creatinine unknown - Labs reviewed- Renal function/electrolytes with Estimated Creatinine Clearance: 84.8 mL/min (based on SCr of 1.2 mg/dL). according to latest data. Monitor urine output and serial BMP and adjust therapy as needed. Avoid nephrotoxins and renally dose meds for GFR listed above.      Transaminitis  -Likely inflammatory response to infection, 2/2 to metabolic acidosis  -US RUQ showing hepatomegaly with steatosis, possibly MEZA  -Hepatitis panel negative and patient denies etoh use  -Cont to trend    HTN (hypertension)  Resume home amlodipine and losartan for now  Triamterene/HCTZ as resume as clinically indicated    Pneumonia  63-year-old man presenting  "directly from cruise ship with pneumonia treated with a Z-monique. He complains of shortness of breath, nausea, diarrhea, coughing, and fatigue.      CXR: "Patchy opacities involving the right greater left lung, concerning for infectious etiology.  Imaging follow-up to resolution in 4-6 weeks would be recommended."      Antibiotics narrowed to ceftriaxone   Procal 26.88 on presentation   Legionella testing pending  Sputum culture with normal respiratory luther  Nasal cannula 5L oxygen, wean down as tolerated  Duonebs q4      Acute respiratory failure with hypoxia and hypercarbia  Patient with Hypercapnic and Hypoxic Respiratory failure which is Acute.  he is not on home oxygen. Supplemental oxygen was provided and noted-      Signs/symptoms of respiratory failure include- increased work of breathing, respiratory distress and wheezing. Contributing diagnoses includes     -Satting in low 90s on HFNC, then started started on BIPAP  -Weaned off BIPAP  -Satting 95-96% on 5L  -Cont to improve  -Cont to wean as tolerated      VTE Risk Mitigation (From admission, onward)         Ordered     IP VTE HIGH RISK PATIENT  Once         09/12/23 1958     Place sequential compression device  Until discontinued         09/12/23 1958     heparin (porcine) injection 7,500 Units  Every 8 hours         09/11/23 1644                Discharge Planning   CB: 9/18/2023     Code Status: Full Code   Is the patient medically ready for discharge?: No    Reason for patient still in hospital (select all that apply): Treatment  Discharge Plan A: Rehab   Discharge Delays: None known at this time              Osmin Zambrano MD  Department of Hospital Medicine   [facility] - Intensive Care ([place] [address])    "

## 2023-09-15 NOTE — RESPIRATORY THERAPY
RAPID RESPONSE RESPIRATORY THERAPY PROACTIVE NOTE           Time of visit: 948     Code Status: Full Code   : 1960  Bed: 97679/38759 A:   MRN: [id number]  Time spent at the bedside: 15 -30 min    SITUATION    Evaluated patient for: Increase in oxygen requirements    BACKGROUND    Why is the patient in the hospital?: Severe sepsis    Patient has a past medical history of Hypertension.    24 Hours Vitals Range:  Temp:  [97.9 °F (36.6 °C)-98.4 °F (36.9 °C)]   Pulse:  []   Resp:  [16-29]   BP: (132-155)/(71-85)   SpO2:  [88 %-98 %]     Labs:    Recent Labs     23  0525 23  0419 09/15/23  0509    137 138   K 4.4 4.5 4.6   CL 98 98 99   CO2 29 29 27   BUN 38* 30* 30*   CREATININE 1.4 1.2 1.2   * 110 96   PHOS 2.7 2.5* 3.1   MG 2.7* 2.1 1.8        Recent Labs     23  2049 23  1427   PH 7.430 7.449   PCO2 53.8* 49.1*   PO2 61* 76*   HCO3 35.7* 34.0*   POCSATURATED 91* 95   BE 11 10       ASSESSMENT/INTERVENTIONS  Called by RN to come evaluate patient for an increase in O2 requirements and O2 SAT'S in 80's. Upon evaluation, bubble container for HFNC was not screwed in all the way into flow meter. Patient was able to be weaned back down to 6LHFNC with O2 SAT 93%. AIRVO HFNC at bedside on standby if needed. Will continue to monitor patient's respiratory status throughout the day.    Last VS   Temp: 98.4 °F (36.9 °C) (09/15 07)  Pulse: 93 (09/15 1024)  Resp: 29 (09/15 1024)  BP: 137/71 (09/15 07)  SpO2: 93 % (09/15 1024)    Level of Consciousness: Level of Consciousness (AVPU): alert  Respiratory Effort: Respiratory Effort: Normal, Unlabored Expansion/Accessory Muscle Usage: Expansion/Accessory Muscles/Retractions: expansion symmetric  All Lung Field Breath Sounds: All Lung Fields Breath Sounds: Posterior:, Anterior:, coarse, diminished  COLTEN Breath Sounds: clear  LLL Breath Sounds: diminished  RUL Breath Sounds: clear  RML Breath Sounds: diminished  RLL Breath Sounds:  diminished  O2 Device/Concentration: 6L HFNC  NIPPV: No Surgical airway: No  ETCO2 monitored:    Ambu at bedside:      Active Orders   Respiratory Care    Incentive spirometry     Frequency: Q1H While awake     Number of Occurrences: Until Specified    Inhalation Treatment Q4H     Frequency: Q4H     Number of Occurrences: Until Specified    Inhalation Treatment Q4H PRN     Frequency: Q4H PRN     Number of Occurrences: Until Specified    Oxygen Continuous     Frequency: Continuous     Number of Occurrences: Until Specified     Order Questions:      Device type: High flow      Device: High Flow Nasal Cannula (6 -15 Liters)      LPM: 6      Titrate O2 per Oxygen Titration Protocol: Yes      To maintain SpO2 goal of: >= 90%      Notify MD of: Inability to achieve desired SpO2; Sudden change in patient status and requires 20% increase in FiO2; Patient requires >60% FiO2    POCT ARTERIAL BLOOD GAS Blood Gas     Frequency: Once     Number of Occurrences: 1 Occurrences     Order Comments: Notify Physician if: see parameters below.       Order Questions:      Component: Blood Gas    POCT Venous Blood Gas     Frequency: Once     Number of Occurrences: 1 Occurrences     Order Comments: This test should be used for VBGs.  If using this order for other tests (K, creatinine, HCT, PT/INR, lactate etc)  ONLY do so in the case of an emergency or rapid response.Notify Physician if: see parameters below.         Order Questions:      Component: Blood Gas    Pulse Oximetry Q4H     Frequency: Q4H     Number of Occurrences: Until Specified       RECOMMENDATIONS    We recommend: RRT Recs: Continue POC per primary team.    FOLLOW-UP    Please call back the Rapid Response RT, Anyi Brambila, RRT at x [phone] for any questions or concerns.

## 2023-09-15 NOTE — ASSESSMENT & PLAN NOTE
Rhinovirus with secondary bacterial pneumonia  CXR: RML and bilateral base consolidations  CT chest: several patchy foci of ground-glass/consolidation bilaterally, suggestive of evolving infectious or inflammatory process    -Continued to require 5L of O2  -Nebulizer treatment  -Antibiotics narrowed to ceftriaxone   -Add steroids  -ID consulted to assist with further management

## 2023-09-15 NOTE — PLAN OF CARE
Problem: Adult Inpatient Plan of Care  Goal: Plan of Care Review  Outcome: Ongoing, Progressing  Goal: Patient-Specific Goal (Individualized)  Outcome: Ongoing, Progressing  Goal: Absence of Hospital-Acquired Illness or Injury  Outcome: Ongoing, Progressing  Goal: Optimal Comfort and Wellbeing  Outcome: Ongoing, Progressing  Goal: Readiness for Transition of Care  Outcome: Ongoing, Progressing     Problem: Bariatric Environmental Safety  Goal: Safety Maintained with Care  Outcome: Ongoing, Progressing     Problem: Adjustment to Illness (Sepsis/Septic Shock)  Goal: Optimal Coping  Outcome: Ongoing, Progressing     Problem: Bleeding (Sepsis/Septic Shock)  Goal: Absence of Bleeding  Outcome: Ongoing, Progressing     Problem: Glycemic Control Impaired (Sepsis/Septic Shock)  Goal: Blood Glucose Level Within Desired Range  Outcome: Ongoing, Progressing     Problem: Infection Progression (Sepsis/Septic Shock)  Goal: Absence of Infection Signs and Symptoms  Outcome: Ongoing, Progressing     Problem: Nutrition Impaired (Sepsis/Septic Shock)  Goal: Optimal Nutrition Intake  Outcome: Ongoing, Progressing     Problem: Fluid and Electrolyte Imbalance (Acute Kidney Injury/Impairment)  Goal: Fluid and Electrolyte Balance  Outcome: Ongoing, Progressing     Problem: Oral Intake Inadequate (Acute Kidney Injury/Impairment)  Goal: Optimal Nutrition Intake  Outcome: Ongoing, Progressing     Problem: Renal Function Impairment (Acute Kidney Injury/Impairment)  Goal: Effective Renal Function  Outcome: Ongoing, Progressing     Problem: Fluid Imbalance (Pneumonia)  Goal: Fluid Balance  Outcome: Ongoing, Progressing     Problem: Infection (Pneumonia)  Goal: Resolution of Infection Signs and Symptoms  Outcome: Ongoing, Progressing     Problem: Respiratory Compromise (Pneumonia)  Goal: Effective Oxygenation and Ventilation  Outcome: Ongoing, Progressing

## 2023-09-15 NOTE — NURSING
Pt O2 sat currently at 89-90 on 9L high flow NC. MD, OC, charge nurse, and rapids aware. No new orders at this time

## 2023-09-15 NOTE — PLAN OF CARE
CLY[facility] [phone]- she confirmed that she only received a face sheet and med listing and stated that she needed more information including family contact information, H&P, consults, therapy notes, vital signs and labs. CM confirmed the fax number of [phone]. CM updated Epic with pts spouse Neeru Luciano [phone] information and faxed a HH packet, current labs, med list, therapy notes, progress notes, micro, radiology and a face sheet to her as well as my return contact information. CM following for acceptance.    09/15/23 1214   Post-Acute Status   Post-Acute Authorization Placement   Post-Acute Placement Status Pending post-acute provider review/more information requested

## 2023-09-15 NOTE — SUBJECTIVE & OBJECTIVE
Interval History: Weaned to 5L 02 with continued cough and dyspnea.     Review of Systems   Constitutional:  Positive for fatigue. Negative for chills and fever.   HENT:  Negative for congestion, rhinorrhea and sore throat.    Respiratory:  Positive for cough, shortness of breath and wheezing. Negative for chest tightness.    Cardiovascular:  Negative for chest pain, palpitations and leg swelling.   Gastrointestinal:  Negative for abdominal pain, blood in stool, diarrhea, nausea and vomiting.   Genitourinary:  Negative for dysuria, flank pain and hematuria.   Musculoskeletal:  Negative for back pain and neck pain.   Skin:  Negative for wound.   Neurological:  Negative for dizziness, weakness and headaches.   Psychiatric/Behavioral:  Negative for confusion.      Objective:     Vital Signs (Most Recent):  Temp: 98.1 °F (36.7 °C) (09/14/23 1601)  Pulse: 82 (09/14/23 1602)  Resp: 16 (09/14/23 1602)  BP: (!) 155/85 (09/14/23 1601)  SpO2: 98 % (09/14/23 1602) Vital Signs (24h Range):  Temp:  [97.7 °F (36.5 °C)-98.7 °F (37.1 °C)] 98.1 °F (36.7 °C)  Pulse:  [69-98] 82  Resp:  [16-27] 16  SpO2:  [87 %-98 %] 98 %  BP: (132-155)/(79-86) 155/85     Weight: 128.8 kg (284 lb)  Body mass index is 41.94 kg/m².    Intake/Output Summary (Last 24 hours) at 9/14/2023 1940  Last data filed at 9/14/2023 1751  Gross per 24 hour   Intake 2400 ml   Output 2700 ml   Net -300 ml         Physical Exam  Vitals and nursing note reviewed.   Constitutional:       Appearance: He is obese. He is ill-appearing.   HENT:      Head: Normocephalic and atraumatic.      Nose: Nose normal.      Comments: NC in place      Mouth/Throat:      Mouth: Mucous membranes are moist.   Eyes:      Conjunctiva/sclera: Conjunctivae normal.   Cardiovascular:      Rate and Rhythm: Normal rate and regular rhythm.      Pulses: Normal pulses.      Heart sounds: Normal heart sounds. No murmur heard.     No gallop.   Pulmonary:      Effort: No respiratory distress.      Breath  sounds: Wheezing present. No rales.      Comments: Diminished breathe sounds   Abdominal:      General: There is no distension.      Palpations: Abdomen is soft.      Tenderness: There is no abdominal tenderness. There is no guarding or rebound.   Musculoskeletal:      Cervical back: Normal range of motion and neck supple.      Right lower leg: No edema.      Left lower leg: No edema.   Skin:     General: Skin is warm and dry.   Neurological:      General: No focal deficit present.      Mental Status: He is alert and oriented to person, place, and time.             Significant Labs: All pertinent labs within the past 24 hours have been reviewed.  BMP:   Recent Labs   Lab 09/14/23 0419         K 4.5   CL 98   CO2 29   BUN 30*   CREATININE 1.2   CALCIUM 8.8   MG 2.1     CBC:   Recent Labs   Lab 09/13/23  0525 09/13/23  1427 09/14/23 0419   WBC 17.58*  --  16.37*   HGB 12.4*  --  12.4*   HCT 37.0* 39 35.7*     --  388       Significant Imaging: I have reviewed all pertinent imaging results/findings within the past 24 hours.

## 2023-09-15 NOTE — NURSING
Pt is AAO x 4, no c/o pain at this time. Pt is on 9L on high flow NC. Pt voices SOB / distress on exertion. Pt remains on continuous telemetry monitoring with HR at sinus tach. Pt is continent of B&B. Pt skin is clean, dry, and intact. Pt is ambulatory with 1 person assistance, pt educated to call for assistance if need, pt voiced understanding. Pt PIV's are clean, dry, and intact with no redness or swelling both are flushed and saline locked. Bed is low and locked call light in reach. Monitoring.

## 2023-09-16 PROBLEM — J44.1 COPD EXACERBATION: Status: ACTIVE | Noted: 2023-09-16

## 2023-09-16 PROBLEM — J18.9 PNEUMONIA DUE TO INFECTIOUS ORGANISM: Status: ACTIVE | Noted: 2023-09-16

## 2023-09-16 LAB
ALBUMIN SERPL BCP-MCNC: 2.3 G/DL (ref 3.5–5.2)
ALP SERPL-CCNC: 138 U/L (ref 55–135)
ALT SERPL W/O P-5'-P-CCNC: 173 U/L (ref 10–44)
ANION GAP SERPL CALC-SCNC: 9 MMOL/L (ref 8–16)
ANISOCYTOSIS BLD QL SMEAR: SLIGHT
AST SERPL-CCNC: 91 U/L (ref 10–40)
BASOPHILS # BLD AUTO: ABNORMAL K/UL (ref 0–0.2)
BASOPHILS NFR BLD: 0 % (ref 0–1.9)
BILIRUB SERPL-MCNC: 0.4 MG/DL (ref 0.1–1)
BUN SERPL-MCNC: 40 MG/DL (ref 8–23)
CALCIUM SERPL-MCNC: 9.2 MG/DL (ref 8.7–10.5)
CHLORIDE SERPL-SCNC: 102 MMOL/L (ref 95–110)
CO2 SERPL-SCNC: 25 MMOL/L (ref 23–29)
CREAT SERPL-MCNC: 1.2 MG/DL (ref 0.5–1.4)
CRYPTOC AG SER QL LA: NEGATIVE
DIFFERENTIAL METHOD: ABNORMAL
EOSINOPHIL # BLD AUTO: ABNORMAL K/UL (ref 0–0.5)
EOSINOPHIL NFR BLD: 0 % (ref 0–8)
ERYTHROCYTE [DISTWIDTH] IN BLOOD BY AUTOMATED COUNT: 15 % (ref 11.5–14.5)
EST. GFR  (NO RACE VARIABLE): >60 ML/MIN/1.73 M^2
GLUCOSE SERPL-MCNC: 129 MG/DL (ref 70–110)
HCT VFR BLD AUTO: 37.5 % (ref 40–54)
HGB BLD-MCNC: 12.7 G/DL (ref 14–18)
HYPOCHROMIA BLD QL SMEAR: ABNORMAL
IMM GRANULOCYTES # BLD AUTO: ABNORMAL K/UL (ref 0–0.04)
IMM GRANULOCYTES NFR BLD AUTO: ABNORMAL % (ref 0–0.5)
LYMPHOCYTES # BLD AUTO: ABNORMAL K/UL (ref 1–4.8)
LYMPHOCYTES NFR BLD: 10 % (ref 18–48)
MAGNESIUM SERPL-MCNC: 1.8 MG/DL (ref 1.6–2.6)
MCH RBC QN AUTO: 30.6 PG (ref 27–31)
MCHC RBC AUTO-ENTMCNC: 33.9 G/DL (ref 32–36)
MCV RBC AUTO: 90 FL (ref 82–98)
METAMYELOCYTES NFR BLD MANUAL: 4 %
MONOCYTES # BLD AUTO: ABNORMAL K/UL (ref 0.3–1)
MONOCYTES NFR BLD: 2 % (ref 4–15)
NEUTROPHILS NFR BLD: 84 % (ref 38–73)
NRBC BLD-RTO: 0 /100 WBC
PHOSPHATE SERPL-MCNC: 3.7 MG/DL (ref 2.7–4.5)
PLATELET # BLD AUTO: 460 K/UL (ref 150–450)
PLATELET BLD QL SMEAR: ABNORMAL
PMV BLD AUTO: 10 FL (ref 9.2–12.9)
POCT GLUCOSE: 126 MG/DL (ref 70–110)
POCT GLUCOSE: 134 MG/DL (ref 70–110)
POCT GLUCOSE: 162 MG/DL (ref 70–110)
POCT GLUCOSE: 204 MG/DL (ref 70–110)
POLYCHROMASIA BLD QL SMEAR: ABNORMAL
POTASSIUM SERPL-SCNC: 5 MMOL/L (ref 3.5–5.1)
PROT SERPL-MCNC: 8.2 G/DL (ref 6–8.4)
RBC # BLD AUTO: 4.15 M/UL (ref 4.6–6.2)
SODIUM SERPL-SCNC: 136 MMOL/L (ref 136–145)
WBC # BLD AUTO: 16.4 K/UL (ref 3.9–12.7)

## 2023-09-16 PROCEDURE — 99900035 HC TECH TIME PER 15 MIN (STAT)

## 2023-09-16 PROCEDURE — 94640 AIRWAY INHALATION TREATMENT: CPT

## 2023-09-16 PROCEDURE — 94799 UNLISTED PULMONARY SVC/PX: CPT

## 2023-09-16 PROCEDURE — 63600175 PHARM REV CODE 636 W HCPCS

## 2023-09-16 PROCEDURE — 25000003 PHARM REV CODE 250: Performed by: INTERNAL MEDICINE

## 2023-09-16 PROCEDURE — 99223 PR INITIAL HOSPITAL CARE,LEVL III: ICD-10-PCS | Mod: ,,, | Performed by: INTERNAL MEDICINE

## 2023-09-16 PROCEDURE — 85007 BL SMEAR W/DIFF WBC COUNT: CPT

## 2023-09-16 PROCEDURE — 94761 N-INVAS EAR/PLS OXIMETRY MLT: CPT

## 2023-09-16 PROCEDURE — 85027 COMPLETE CBC AUTOMATED: CPT

## 2023-09-16 PROCEDURE — 84100 ASSAY OF PHOSPHORUS: CPT

## 2023-09-16 PROCEDURE — 97530 THERAPEUTIC ACTIVITIES: CPT

## 2023-09-16 PROCEDURE — 63600175 PHARM REV CODE 636 W HCPCS: Performed by: INTERNAL MEDICINE

## 2023-09-16 PROCEDURE — 86738 MYCOPLASMA ANTIBODY: CPT | Performed by: INTERNAL MEDICINE

## 2023-09-16 PROCEDURE — 80053 COMPREHEN METABOLIC PANEL: CPT

## 2023-09-16 PROCEDURE — 27000221 HC OXYGEN, UP TO 24 HOURS

## 2023-09-16 PROCEDURE — 99223 1ST HOSP IP/OBS HIGH 75: CPT | Mod: ,,, | Performed by: INTERNAL MEDICINE

## 2023-09-16 PROCEDURE — 99233 PR SUBSEQUENT HOSPITAL CARE,LEVL III: ICD-10-PCS | Mod: ,,, | Performed by: INTERNAL MEDICINE

## 2023-09-16 PROCEDURE — 99233 SBSQ HOSP IP/OBS HIGH 50: CPT | Mod: ,,, | Performed by: INTERNAL MEDICINE

## 2023-09-16 PROCEDURE — 27000207 HC ISOLATION

## 2023-09-16 PROCEDURE — 86635 COCCIDIOIDES ANTIBODY: CPT | Performed by: INTERNAL MEDICINE

## 2023-09-16 PROCEDURE — 97110 THERAPEUTIC EXERCISES: CPT

## 2023-09-16 PROCEDURE — 97530 THERAPEUTIC ACTIVITIES: CPT | Mod: CQ

## 2023-09-16 PROCEDURE — 86403 PARTICLE AGGLUT ANTBDY SCRN: CPT | Performed by: INTERNAL MEDICINE

## 2023-09-16 PROCEDURE — 83735 ASSAY OF MAGNESIUM: CPT

## 2023-09-16 PROCEDURE — 36415 COLL VENOUS BLD VENIPUNCTURE: CPT

## 2023-09-16 PROCEDURE — 25000003 PHARM REV CODE 250

## 2023-09-16 PROCEDURE — 25000242 PHARM REV CODE 250 ALT 637 W/ HCPCS

## 2023-09-16 PROCEDURE — 25000242 PHARM REV CODE 250 ALT 637 W/ HCPCS: Performed by: INTERNAL MEDICINE

## 2023-09-16 PROCEDURE — 20600001 HC STEP DOWN PRIVATE ROOM

## 2023-09-16 RX ORDER — SODIUM CHLORIDE 9 MG/ML
INJECTION, SOLUTION INTRAVENOUS CONTINUOUS
Status: ACTIVE | OUTPATIENT
Start: 2023-09-16 | End: 2023-09-17

## 2023-09-16 RX ORDER — FLUTICASONE FUROATE AND VILANTEROL 100; 25 UG/1; UG/1
1 POWDER RESPIRATORY (INHALATION) DAILY
Status: DISCONTINUED | OUTPATIENT
Start: 2023-09-16 | End: 2023-09-20 | Stop reason: HOSPADM

## 2023-09-16 RX ORDER — AMLODIPINE BESYLATE 10 MG/1
10 TABLET ORAL DAILY
Status: DISCONTINUED | OUTPATIENT
Start: 2023-09-16 | End: 2023-09-20 | Stop reason: HOSPADM

## 2023-09-16 RX ORDER — FLUTICASONE FUROATE AND VILANTEROL 200; 25 UG/1; UG/1
1 POWDER RESPIRATORY (INHALATION) DAILY
Status: DISCONTINUED | OUTPATIENT
Start: 2023-09-16 | End: 2023-09-16

## 2023-09-16 RX ADMIN — HEPARIN SODIUM 7500 UNITS: 5000 INJECTION INTRAVENOUS; SUBCUTANEOUS at 08:09

## 2023-09-16 RX ADMIN — LEVALBUTEROL 1.25 MG: 1.25 SOLUTION, CONCENTRATE RESPIRATORY (INHALATION) at 07:09

## 2023-09-16 RX ADMIN — LEVALBUTEROL 1.25 MG: 1.25 SOLUTION, CONCENTRATE RESPIRATORY (INHALATION) at 12:09

## 2023-09-16 RX ADMIN — LEVALBUTEROL 1.25 MG: 1.25 SOLUTION, CONCENTRATE RESPIRATORY (INHALATION) at 08:09

## 2023-09-16 RX ADMIN — CEFTRIAXONE 2 G: 2 INJECTION, POWDER, FOR SOLUTION INTRAMUSCULAR; INTRAVENOUS at 08:09

## 2023-09-16 RX ADMIN — LEVALBUTEROL 1.25 MG: 1.25 SOLUTION, CONCENTRATE RESPIRATORY (INHALATION) at 03:09

## 2023-09-16 RX ADMIN — IPRATROPIUM BROMIDE 0.5 MG: 0.5 SOLUTION RESPIRATORY (INHALATION) at 08:09

## 2023-09-16 RX ADMIN — FLUTICASONE FUROATE AND VILANTEROL TRIFENATATE 1 PUFF: 100; 25 POWDER RESPIRATORY (INHALATION) at 04:09

## 2023-09-16 RX ADMIN — SODIUM CHLORIDE: 9 INJECTION, SOLUTION INTRAVENOUS at 12:09

## 2023-09-16 RX ADMIN — HEPARIN SODIUM 7500 UNITS: 5000 INJECTION INTRAVENOUS; SUBCUTANEOUS at 02:09

## 2023-09-16 RX ADMIN — AMLODIPINE BESYLATE 10 MG: 10 TABLET ORAL at 09:09

## 2023-09-16 RX ADMIN — BENZONATATE 200 MG: 100 CAPSULE ORAL at 02:09

## 2023-09-16 RX ADMIN — IPRATROPIUM BROMIDE 0.5 MG: 0.5 SOLUTION RESPIRATORY (INHALATION) at 03:09

## 2023-09-16 RX ADMIN — HEPARIN SODIUM 7500 UNITS: 5000 INJECTION INTRAVENOUS; SUBCUTANEOUS at 05:09

## 2023-09-16 RX ADMIN — INSULIN ASPART 1 UNITS: 100 INJECTION, SOLUTION INTRAVENOUS; SUBCUTANEOUS at 08:09

## 2023-09-16 RX ADMIN — IPRATROPIUM BROMIDE 0.5 MG: 0.5 SOLUTION RESPIRATORY (INHALATION) at 07:09

## 2023-09-16 RX ADMIN — BENZONATATE 200 MG: 100 CAPSULE ORAL at 09:09

## 2023-09-16 RX ADMIN — BENZONATATE 200 MG: 100 CAPSULE ORAL at 08:09

## 2023-09-16 RX ADMIN — PREDNISONE 40 MG: 20 TABLET ORAL at 09:09

## 2023-09-16 RX ADMIN — IPRATROPIUM BROMIDE 0.5 MG: 0.5 SOLUTION RESPIRATORY (INHALATION) at 04:09

## 2023-09-16 RX ADMIN — LEVALBUTEROL 1.25 MG: 1.25 SOLUTION, CONCENTRATE RESPIRATORY (INHALATION) at 04:09

## 2023-09-16 NOTE — PT/OT/SLP PROGRESS
Physical Therapy Treatment    Patient Name:  Umair Kenney   MRN:  [id number]    Recommendations:     Discharge Recommendations: rehabilitation facility  Discharge Equipment Recommendations:  (TBD at next level of care)  Barriers to discharge: Inaccessible home and Decreased caregiver support    Assessment:     Umair Kenney is a 63 y.o. male admitted with a medical diagnosis of Severe sepsis.  He presents with the following impairments/functional limitations: weakness, impaired endurance, impaired self care skills, impaired functional mobility, gait instability, impaired cardiopulmonary response to activity, edema . Patient fatigued very easily and appeared a little anxious at times, as He was sad to be far away from home. Patient showed good strength with transfers, but fatigued very easily.    Rehab Prognosis: Good and Fair; patient would benefit from acute skilled PT services to address these deficits and reach maximum level of function.    Recent Surgery: * No surgery found *      Plan:     During this hospitalization, patient to be seen 3 x/week to address the identified rehab impairments via gait training, therapeutic activities, therapeutic exercises, neuromuscular re-education and progress toward the following goals:    Plan of Care Expires:  10/12/23    Subjective     Chief Complaint: Fatigue  Patient/Family Comments/goals: to get stronger, so He can go back to his home in [place].  Pain/Comfort:  Pain Rating 1: 0/10  Pain Rating Post-Intervention 1: 0/10      Objective:     Communicated with NSG prior to session.  Patient found up in chair with telemetry, peripheral IV, pulse ox (continuous), oxygen upon PT entry to room.     General Precautions: Standard, contact, droplet, fall  Orthopedic Precautions: N/A  Braces: N/A  Respiratory Status: Nasal cannula, flow 5 L/min     Functional Mobility:  Transfers:     Sit to Stand:  contact guard assistance with rolling walker  Gait: 8 ft with RW and CGA, limited as  He reported feeling to tired to continue, with 5L o2 and IV pole in tow.      AM-PAC 6 CLICK MOBILITY  Turning over in bed (including adjusting bedclothes, sheets and blankets)?: 3  Sitting down on and standing up from a chair with arms (e.g., wheelchair, bedside commode, etc.): 3  Moving from lying on back to sitting on the side of the bed?: 3  Moving to and from a bed to a chair (including a wheelchair)?: 3  Need to walk in hospital room?: 3  Climbing 3-5 steps with a railing?: 2  Basic Mobility Total Score: 17       Treatment & Education:  Donned a second gown. Treatment was very limited as it was interrupted on 2 occasions, first my Pulmonology medical team and then by X-Ray tech.  Patient educated on the importance of mobility.    Patient left up in chair with all lines intact, call button in reach, and X-Ray tech present..    GOALS:   Multidisciplinary Problems       Physical Therapy Goals          Problem: Physical Therapy    Goal Priority Disciplines Outcome Goal Variances Interventions   Physical Therapy Goal     PT, PT/OT Ongoing, Progressing     Description: Goals to be met by: 23     Patient will increase functional independence with mobility by performin. Supine to sit with Stand-by Assistance  2. Sit to supine with Stand-by Assistance  3. Sit to stand transfer with Supervision  4. Bed to chair transfer with Supervision using No Assistive Device  5. Gait  x 200 feet with Supervision using No Assistive Device.   6. Navigate 16 steps with bilateral handrail with supervision with no AD.                         Time Tracking:     PT Received On: 23  PT Start Time: 1420     PT Stop Time: 1448  PT Total Time (min): 28 min     Billable Minutes: Therapeutic Activity 28    Treatment Type: Treatment  PT/PTA: PTA     Number of PTA visits since last PT visit: 2023

## 2023-09-16 NOTE — ASSESSMENT & PLAN NOTE
Patient with Hypercapnic and Hypoxic Respiratory failure which is Acute.  he is not on home oxygen. Supplemental oxygen was provided and noted-      Signs/symptoms of respiratory failure include- increased work of breathing, respiratory distress and wheezing. Contributing diagnoses includes     -Weaned off BIPAP  -Satting 95-96% on 5L  -Add Prednisone  -Consult pulmonary  -Cont to wean as tolerated

## 2023-09-16 NOTE — ASSESSMENT & PLAN NOTE
Rhinovirus with secondary bacterial pneumonia  CXR: RML and bilateral base consolidations  CT chest: several patchy foci of ground-glass/consolidation bilaterally, suggestive of evolving infectious or inflammatory process    -Continued to require 5L of O2  -Nebulizer treatment  -Antibiotics narrowed to ceftriaxone   -Continue steroids  -ID consulted to assist with further management

## 2023-09-16 NOTE — CONSULTS
Pulm/CC Fellow Consult Note    Attending Physician: Osmin Zambrano MD    Date of Admit: 9/10/2023    Reason for Consult: clinically unchanged pna and oxygen requirements    History of Present Illness:  Umair Kenney is a 63 y.o.  male with a h/o HTN and obesity who presented to the ED with SOB while visiting [facility] for planned vacation.     The patient states he was in his usual state of health until a week prior to presentation when he started having worsening shortness of breath. He was also having fevers/chills, cough, diarrhea. He thinks it all started after he  was caught out in the rain on the first day of the cruise.  He initially went to a doctor on the cruise ship he was on and was given abx (zpack) for bronchitis, then continued to worsen over days and found to have PNA on CXR so sent to the ED. In the ED, found to have septic shock, admitted to the ICU with DANNI and acute hypoxic respiratory failure. He was able to be weaned to 9-10L NC and stepped down to the floor on 9/13 where he has continued to show slow improvements in oxygenation (on 5L NC). Chest CT with dense consolidation on initial CT 9/10 and repeat 9/15 with improvements in consolidation. Resp viral panel positive for rhinovirus on 9/13.     Of note, pt has a history of 2 significant lung infections. He states he was 17 years old when he had a bronchoscopy but he doesn't remember much about it. He had another bronchoscopy more several years ago he said he had a bad infection but he improved afterwards. He doesn't remember what all they said about the results, just that he felt better. 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 He has never been told he has underlying lung problems before that he knows of. No h/o tobacco use. Denies any recent weight loss. He was started on steroids trial yesterday.     Past Medical History:  Past Medical History:   Diagnosis Date    Hypertension        Past Surgical  History:  History reviewed. No pertinent surgical history.    Allergies:  Review of patient's allergies indicates:  No Known Allergies    Family History:  History reviewed. No pertinent family history.    Social History:  Social History     Tobacco Use    Smoking status: Never    Smokeless tobacco: Never   Substance Use Topics    Alcohol use: Never    Drug use: Never       Review of Systems:  Review of Systems   Constitutional:  Positive for fever and malaise/fatigue. Negative for weight loss.   HENT:  Positive for congestion.    Respiratory:  Positive for cough, sputum production and shortness of breath. Negative for wheezing.    Gastrointestinal:  Positive for diarrhea.        Objective:   Last 24 Hour Vital Signs:  BP  Min: 143/87  Max: 168/83  Temp  Av °F (36.7 °C)  Min: 97.7 °F (36.5 °C)  Max: 98.5 °F (36.9 °C)  Pulse  Av.5  Min: 68  Max: 102  Resp  Av.4  Min: 18  Max: 97  SpO2  Av %  Min: 94 %  Max: 99 %  Body mass index is 41.94 kg/m².  I/O last 3 completed shifts:  In: 1622.4 [P.O.:1530; IV Piggyback:92.4]  Out: 1475 [Urine:1475]    Physical Exam:  Physical Exam  Vitals and nursing note reviewed.   Constitutional:       General: He is not in acute distress.     Comments: Sitting up in the chair, able to converse. Can speak in full sentences but intermittently stops to catch his breath.    HENT:      Head: Normocephalic and atraumatic.      Nose: Nose normal.      Comments: NC in place     Mouth/Throat:      Mouth: Mucous membranes are moist.   Eyes:      General:         Right eye: No discharge.         Left eye: No discharge.      Extraocular Movements: Extraocular movements intact.      Conjunctiva/sclera: Conjunctivae normal.   Cardiovascular:      Rate and Rhythm: Normal rate and regular rhythm.      Heart sounds: No murmur heard.  Pulmonary:      Effort: Pulmonary effort is normal.      Breath sounds: No wheezing.      Comments: Crackles on R side, worse at base. No wheezing. Good  air movement b/l.   Abdominal:      General: There is no distension.      Palpations: Abdomen is soft.   Musculoskeletal:      Right lower leg: No edema.      Left lower leg: No edema.   Skin:     General: Skin is warm and dry.      Capillary Refill: Capillary refill takes less than 2 seconds.   Neurological:      General: No focal deficit present.      Mental Status: He is alert and oriented to person, place, and time.         Imagin/15 Chest CT: Impression:  1. Allowing for exam limitations above, no convincing pulmonary thromboembolism, please see above for limitations.  2. Interval improved aeration of the right lower lobe noting residual patchy ground-glass and consolidation remains.  There is a small right pleural effusion, similar to the previous exam.  3. There are several additional patchy foci of ground-glass/consolidation bilaterally, some of which appears stable, some of which have increased since the previous exam.  Findings suggest evolving infectious or inflammatory process, continued follow-up is advised to exclude underlying malignancy.  Scattered tree-in-bud type nodules suggest infectious or inflammatory process.  4. Please see above for several additional findings.    Assessment & Plan:     64 yo M with a h/o HTN and obesity who presented to the ED with septic shock 2/2 bacterial pna and rhinovirus, slowly improving on abx. Pt with initial dense consolidation that has slowly improved on repeat imaging and able to be slowly weaned down on O2 requirements. Agree that this was severe bacterial pna superimposed on viral pna and anticipate this will continue to improve with time.     - Continue abx for severe CAP, day 7 abx (s/p completion of atypical abx, now on rocephin)   - Agree with steroids trial to see if that will improve sx and hypoxia   - Nodule noted on CT, could be acute process vs not, will need repeat CT scan in 6-8 weeks after resolution of acute illness, discussed with pt as  likely this will happen back in [place] - please note in discharge summary/planning   - No acute indication for bronchoscopy, pt with slow improvements, continue to trend    Thank you for this consultation. Please contact me with any questions. Pulmonology will sign off at this time.     Purnima Dillon MD  [facility] Pulmonary and Critical Care Fellow   09/16/2023

## 2023-09-16 NOTE — PLAN OF CARE
Pt A&Ox4. Denies chest pain or SOB. VSS, 7L HFNC, afebrile, NSR/ST. Up SBA. Pt had two episodes of soft stool, no discomfort noted. Pt did not c/o SOB, weaned pt from 10L to 7L at beginning of shift, pt tolerated well. Pt most comfortable resting in chair. Occasional cough, nonproductive. No other changes overnight, continuing POC.

## 2023-09-16 NOTE — PROGRESS NOTES
[facility] - Intensive Care ([place] [address])  [facility] Medicine  Progress Note    Patient Name: Umair Kenney  MRN: [id number]  Patient Class: IP- Inpatient   Admission Date: 9/10/2023  Length of Stay: 5 days  Attending Physician: Osmin Zambrano MD  Primary Care Provider: Stacia, Primary Doctor        Subjective:     Principal Problem:Severe sepsis        HPI:  63-year-old male with pmh of HTN and hx of PNA at age 17 and 55 that presents after returning from a [employer] cruise to [place] for 1 week. Prior to leaving [place] on the cruise, he starting to feel sick. He began having fever, chills, and ?bloody productive cough after leaving. The cruise doctor treated him for URI and bronchitis with zpac/cough suppressant. Throughout the trip, his symptoms progressively worsened and began having diarrhea (10 times daily) and shortness of breath. He was seen again by the doctor this AM and found to have PNA on CXR. Complaints of orthopnea, PND, and apnea at night. Denies any sick contacts or anyone else on cruise getting sick.     Patient's BP was in the 50s/40s on arrival per EMS. He was given a 1L of fluid and satting in the low 90s on 5L NC. Due to his acidemia and RR in the 30s on arrival, he was put on BIPAP and weaned to comfort flow satting 95-96%.     ED: afebrile, -110s, WBC 23, Na 128, K 3.2, bicarb 16, anion gap 20, Cr 3.5, AST//89  Procal 26, VBG - ph 7.2, CO2 49, HCO3 20.5, lactic 5.78, point of care lactic 1.85  CXR - R lung patchy opacities > L lung    Started on IV vanc/cefepime/azithromycin and admitted to medicine.            Overview/Hospital Course:  Patient admitted to MICU for increased work of breathing secondary to pneumonia. His legionella studies are pending. Initially treated with azithromycin, cefepime, and vancomycin and narrowed to ceftriaxone and azithromycin on 9/12/23. O2 requirements improving. Patient is not tolerating BiPAP overnight. Since admission, he has had worsening  LFTs with workup including liver ultrasound and hepatitis panel.      Interval History: WBC 16k. Still on 5L O2 and he desaturates with lowered oxygen.    Review of Systems   Constitutional:  Positive for fatigue. Negative for chills and fever.   Respiratory:  Positive for cough, shortness of breath and wheezing.    Cardiovascular:  Negative for leg swelling.   Gastrointestinal:  Negative for abdominal pain, diarrhea, nausea and vomiting.   Genitourinary:  Negative for dysuria.   Musculoskeletal:  Negative for back pain.   Skin:  Negative for rash.   Neurological:  Negative for headaches.   Psychiatric/Behavioral:  Negative for confusion.      Objective:     Vital Signs (Most Recent):  Temp: 98.1 °F (36.7 °C) (09/15/23 1504)  Pulse: 87 (09/15/23 1529)  Resp: (!) 24 (09/15/23 1509)  BP: (!) 155/85 (09/15/23 1504)  SpO2: 95 % (09/15/23 1529) Vital Signs (24h Range):  Temp:  [97.9 °F (36.6 °C)-98.4 °F (36.9 °C)] 98.1 °F (36.7 °C)  Pulse:  [] 87  Resp:  [18-29] 24  SpO2:  [87 %-98 %] 95 %  BP: (134-178)/() 155/85     Weight: 128.8 kg (284 lb)  Body mass index is 41.94 kg/m².    Intake/Output Summary (Last 24 hours) at 9/15/2023 1848  Last data filed at 9/15/2023 1705  Gross per 24 hour   Intake 1142.44 ml   Output 1225 ml   Net -82.56 ml         Physical Exam  Constitutional:       Appearance: He is ill-appearing.   HENT:      Head: Normocephalic and atraumatic.   Eyes:      Extraocular Movements: Extraocular movements intact.      Pupils: Pupils are equal, round, and reactive to light.   Cardiovascular:      Rate and Rhythm: Regular rhythm.   Pulmonary:      Comments: crackles  Abdominal:      General: There is no distension.      Palpations: Abdomen is soft.      Tenderness: There is no abdominal tenderness.   Musculoskeletal:      Cervical back: Normal range of motion and neck supple.   Neurological:      General: No focal deficit present.      Mental Status: He is oriented to person, place, and time.              Significant Labs: All pertinent labs within the past 24 hours have been reviewed.  BMP:   Recent Labs   Lab 09/15/23  0509   GLU 96      K 4.6   CL 99   CO2 27   BUN 30*   CREATININE 1.2   CALCIUM 9.1   MG 1.8     CBC:   Recent Labs   Lab 09/14/23  0419 09/15/23  0509   WBC 16.37* 14.46*   HGB 12.4* 12.7*   HCT 35.7* 37.3*    449       Significant Imaging: I have reviewed all pertinent imaging results/findings within the past 24 hours.      Assessment/Plan:      * Severe sepsis  This patient does have evidence of infective focus  My overall impression is sepsis.  Source: Respiratory  Antibiotics given-   Antibiotics (72h ago, onward)    Start     Stop Route Frequency Ordered    09/12/23 2100  cefTRIAXone (ROCEPHIN) 2 g in dextrose 5 % in water (D5W) 100 mL IVPB (MB+)         -- IV Every 24 hours (non-standard times) 09/12/23 1051    09/10/23 2230  mupirocin 2 % ointment         09/15/23 2059 Nasl 2 times daily 09/10/23 2123        Organ dysfunction indicated by Acute liver injury, Acute respiratory failure and transaminitis     Fluid challenge Actual Body weight- Patient will receive 30ml/kg actual body weight to calculate fluid bolus for treatment of septic shock.     Will Not start Pressors- Levophed for MAP of 65    Afebrile and satting on the 90s with RR in 30s on arrival  -WBC 23, RR >22, lactic 5.88 improved to 1.85 with fluids, procal 26.88  -Antibiotics have been de-escalated to ceftriaxone  -needed BIPAP and weaned to comfort flow    Rhinovirus        Severe obesity (BMI >= 40)  Body mass index is 41.94 kg/m². Morbid obesity complicates all aspects of disease management from diagnostic modalities to treatment. Weight loss encouraged and health benefits explained to patient.         DANNI (acute kidney injury)  Patient with acute kidney injury/acute renal failure likely due to pre-renal azotemia due to dehydration DANNI is currently stable. Baseline creatinine unknown - Labs reviewed-  Renal function/electrolytes with Estimated Creatinine Clearance: 84.8 mL/min (based on SCr of 1.2 mg/dL). according to latest data. Monitor urine output and serial BMP and adjust therapy as needed. Avoid nephrotoxins and renally dose meds for GFR listed above.      Transaminitis  -Likely inflammatory response to viral infection  -US RUQ showing hepatomegaly with steatosis, possibly MEZA  -monitor    HTN (hypertension)  Resume home amlodipine and losartan for now  Triamterene/HCTZ as resume as clinically indicated    Community acquired bacterial pneumonia  Rhinovirus with secondary bacterial pneumonia  CXR: RML and bilateral base consolidations  CT chest: several patchy foci of ground-glass/consolidation bilaterally, suggestive of evolving infectious or inflammatory process    -Continued to require 5L of O2  -Nebulizer treatment  -Antibiotics narrowed to ceftriaxone   -Add steroids  -ID consulted to assist with further management        Acute respiratory failure with hypoxia and hypercarbia  Patient with Hypercapnic and Hypoxic Respiratory failure which is Acute.  he is not on home oxygen. Supplemental oxygen was provided and noted-      Signs/symptoms of respiratory failure include- increased work of breathing, respiratory distress and wheezing. Contributing diagnoses includes     -Weaned off BIPAP  -Satting 95-96% on 5L  -Cont to improve  -Cont to wean as tolerated      VTE Risk Mitigation (From admission, onward)         Ordered     IP VTE HIGH RISK PATIENT  Once         09/12/23 1958     Place sequential compression device  Until discontinued         09/12/23 1958     heparin (porcine) injection 7,500 Units  Every 8 hours         09/11/23 1644                Discharge Planning   CB: 9/18/2023     Code Status: Full Code   Is the patient medically ready for discharge?: No    Reason for patient still in hospital (select all that apply): Treatment and Consult recommendations  Discharge Plan A: Rehab   Discharge  Delays: None known at this time              Osmin Zambrano MD  Department of Hospital Medicine   [facility] - Intensive Care ([place]-14)

## 2023-09-16 NOTE — ASSESSMENT & PLAN NOTE
-Likely inflammatory response to viral infection  -US RUQ showing hepatomegaly with steatosis, possibly MEZA  -monitor

## 2023-09-16 NOTE — RESPIRATORY THERAPY
RAPID RESPONSE RESPIRATORY THERAPY PROACTIVE ROUNDING NOTE             Time of visit: 916     Code Status: Full Code   : 1960  Bed: 78841/73279 A:   MRN: [id number]  Time spent at the bedside: < 15 min    SITUATION    Evaluated patient for: HFNC Compliance     BACKGROUND    Patient has a past medical history of Hypertension.    24 Hours Vitals Range:  Temp:  [97.7 °F (36.5 °C)-98.5 °F (36.9 °C)]   Pulse:  []   Resp:  [18-97]   BP: (143-178)/()   SpO2:  [87 %-99 %]     Labs:    Recent Labs     23  0419 09/15/23  0509 23  0559    138 136   K 4.5 4.6 5.0   CL 98 99 102   CO2 29 27 25   BUN 30* 30* 40*   CREATININE 1.2 1.2 1.2    96 129*   PHOS 2.5* 3.1 3.7   MG 2.1 1.8 1.8        Recent Labs     23  1427   PH 7.449   PCO2 49.1*   PO2 76*   HCO3 34.0*   POCSATURATED 95   BE 10       ASSESSMENT/INTERVENTIONS    Patient resting comfortably. No respiratory concerns at this time.    Last VS   Temp: 97.7 °F (36.5 °C) (810)  Pulse: 73 (916)  Resp: 18 (916)  BP: 168/83 (810)  SpO2: 99 % (916)    Level of Consciousness: Level of Consciousness (AVPU): alert  Respiratory Effort: Respiratory Effort: Normal, Unlabored Expansion/Accessory Muscle Usage: Expansion/Accessory Muscles/Retractions: no use of accessory muscles, no retractions, expansion symmetric  All Lung Field Breath Sounds: All Lung Fields Breath Sounds: diminished  COLTEN Breath Sounds: clear  LLL Breath Sounds: diminished  RUL Breath Sounds: clear  RML Breath Sounds: diminished  RLL Breath Sounds: diminished  O2 Device/Concentration: 5L NC  Was the O2 device able to be weaned? Yes  Ambu at bedside:      Active Orders   Respiratory Care    Incentive spirometry     Frequency: Q4H WAKE     Number of Occurrences: Until Specified    Inhalation Treatment Q4H     Frequency: Q4H     Number of Occurrences: Until Specified    Inhalation Treatment Q4H PRN     Frequency: Q4H PRN     Number of  Occurrences: Until Specified    Oxygen Continuous     Frequency: Continuous     Number of Occurrences: Until Specified     Order Questions:      Device type: High flow      Device: High Flow Nasal Cannula (6 -15 Liters)      LPM: 6      Titrate O2 per Oxygen Titration Protocol: Yes      To maintain SpO2 goal of: >= 90%      Notify MD of: Inability to achieve desired SpO2; Sudden change in patient status and requires 20% increase in FiO2; Patient requires >60% FiO2    POCT Venous Blood Gas     Frequency: Once     Number of Occurrences: 1 Occurrences     Order Comments: This test should be used for VBGs.  If using this order for other tests (K, creatinine, HCT, PT/INR, lactate etc)  ONLY do so in the case of an emergency or rapid response.Notify Physician if: see parameters below.         Order Questions:      Component: Blood Gas    Pulse Oximetry Q4H     Frequency: Q4H     Number of Occurrences: Until Specified       RECOMMENDATIONS    We recommend: RRT Recs: Continue POC per primary team.      FOLLOW-UP    Please call back the Rapid Response [facility] RRT at x [phone] for any questions or concerns.

## 2023-09-16 NOTE — ASSESSMENT & PLAN NOTE
Patient with Hypercapnic and Hypoxic Respiratory failure which is Acute.  he is not on home oxygen. Supplemental oxygen was provided and noted-      Signs/symptoms of respiratory failure include- increased work of breathing, respiratory distress and wheezing. Contributing diagnoses includes     -Weaned off BIPAP  -Satting 95-96% on 5L  -Cont to improve  -Cont to wean as tolerated

## 2023-09-16 NOTE — PROGRESS NOTES
[facility] - Intensive Care ([place] [address])  [facility]  Progress Note    Patient Name: Umair Kenney  MRN: [id number]  Patient Class: IP- Inpatient   Admission Date: 9/10/2023  Length of Stay: 6 days  Attending Physician: Osmin Zambrano MD  Primary Care Provider: Stacia, Primary Doctor        Subjective:     Principal Problem:Severe sepsis        HPI:  63-year-old male with pmh of HTN and hx of PNA at age 17 and 55 that presents after returning from a [employer] cruise to [place] for 1 week. Prior to leaving [place] on the cruise, he starting to feel sick. He began having fever, chills, and ?bloody productive cough after leaving. The cruise doctor treated him for URI and bronchitis with zpac/cough suppressant. Throughout the trip, his symptoms progressively worsened and began having diarrhea (10 times daily) and shortness of breath. He was seen again by the doctor this AM and found to have PNA on CXR. Complaints of orthopnea, PND, and apnea at night. Denies any sick contacts or anyone else on cruise getting sick.     Patient's BP was in the 50s/40s on arrival per EMS. He was given a 1L of fluid and satting in the low 90s on 5L NC. Due to his acidemia and RR in the 30s on arrival, he was put on BIPAP and weaned to comfort flow satting 95-96%.     ED: afebrile, -110s, WBC 23, Na 128, K 3.2, bicarb 16, anion gap 20, Cr 3.5, AST//89  Procal 26, VBG - ph 7.2, CO2 49, HCO3 20.5, lactic 5.78, point of care lactic 1.85  CXR - R lung patchy opacities > L lung    Started on IV vanc/cefepime/azithromycin and admitted to medicine.            Overview/Hospital Course:  Patient admitted to MICU for increased work of breathing secondary to pneumonia. His legionella studies are pending. Initially treated with azithromycin, cefepime, and vancomycin and narrowed to ceftriaxone and azithromycin on 9/12/23. O2 requirements improving. Patient is not tolerating BiPAP overnight. Since admission, he has had worsening  LFTs with workup including liver ultrasound and hepatitis panel.      No new subjective & objective note has been filed under this hospital service since the last note was generated.      Assessment/Plan:      * Severe sepsis  This patient does have evidence of infective focus  My overall impression is sepsis.  Source: Respiratory  Antibiotics given-   Antibiotics (72h ago, onward)    Start     Stop Route Frequency Ordered    09/12/23 2100  cefTRIAXone (ROCEPHIN) 2 g in dextrose 5 % in water (D5W) 100 mL IVPB (MB+)         -- IV Every 24 hours (non-standard times) 09/12/23 1051    09/10/23 2230  mupirocin 2 % ointment         09/15/23 2059 Nasl 2 times daily 09/10/23 2123        Organ dysfunction indicated by Acute liver injury, Acute respiratory failure and transaminitis     Fluid challenge Actual Body weight- Patient will receive 30ml/kg actual body weight to calculate fluid bolus for treatment of septic shock.     Will Not start Pressors- Levophed for MAP of 65    Afebrile and satting on the 90s with RR in 30s on arrival  -WBC 23, RR >22, lactic 5.88 improved to 1.85 with fluids, procal 26.88  -Antibiotics have been de-escalated to ceftriaxone  -needed BIPAP and weaned to comfort flow    COPD exacerbation  -Add bero  -Continue nebulizers and steroids      Rhinovirus        Severe obesity (BMI >= 40)  Body mass index is 41.94 kg/m². Morbid obesity complicates all aspects of disease management from diagnostic modalities to treatment. Weight loss encouraged and health benefits explained to patient.         DANNI (acute kidney injury)  Patient with acute kidney injury/acute renal failure likely due to pre-renal azotemia due to dehydration DANNI is currently stable. Baseline creatinine unknown - Labs reviewed- Renal function/electrolytes with Estimated Creatinine Clearance: 84.8 mL/min (based on SCr of 1.2 mg/dL). according to latest data.   -Give gentle hydration, insensible water loss from his underlying resp  issues    Transaminitis  -Likely inflammatory response to viral infection  -US RUQ showing hepatomegaly with steatosis, possibly MEZA  -monitor    HTN (hypertension)  Resume home amlodipine and losartan for now  Triamterene/HCTZ as resume as clinically indicated    Community acquired bacterial pneumonia  Rhinovirus with secondary bacterial pneumonia  CXR: RML and bilateral base consolidations  CT chest: several patchy foci of ground-glass/consolidation bilaterally, suggestive of evolving infectious or inflammatory process    -Continued to require 5L of O2  -Nebulizer treatment  -Antibiotics narrowed to ceftriaxone   -Continue steroids  -ID consulted to assist with further management        Acute respiratory failure with hypoxia and hypercarbia  Patient with Hypercapnic and Hypoxic Respiratory failure which is Acute.  he is not on home oxygen. Supplemental oxygen was provided and noted-      Signs/symptoms of respiratory failure include- increased work of breathing, respiratory distress and wheezing. Contributing diagnoses includes     -Weaned off BIPAP  -Satting 95-96% on 5L  -Add Prednisone  -Consult pulmonary  -Cont to wean as tolerated      VTE Risk Mitigation (From admission, onward)         Ordered     IP VTE HIGH RISK PATIENT  Once         09/12/23 1958     Place sequential compression device  Until discontinued         09/12/23 1958     heparin (porcine) injection 7,500 Units  Every 8 hours         09/11/23 1644                Discharge Planning   CB: 9/18/2023     Code Status: Full Code   Is the patient medically ready for discharge?: No    Reason for patient still in hospital (select all that apply): Treatment and Consult recommendations  Discharge Plan A: Rehab   Discharge Delays: None known at this time              Osmin Zambrano MD  Department of Hospital Medicine   [facility] - Intensive Care ([place])

## 2023-09-16 NOTE — ASSESSMENT & PLAN NOTE
Patient with acute kidney injury/acute renal failure likely due to pre-renal azotemia due to dehydration DANNI is currently stable. Baseline creatinine unknown - Labs reviewed- Renal function/electrolytes with Estimated Creatinine Clearance: 84.8 mL/min (based on SCr of 1.2 mg/dL). according to latest data.   -Give gentle hydration, insensible water loss from his underlying resp issues

## 2023-09-16 NOTE — PROGRESS NOTES
[facility] - Intensive Care ([place] [address])  [facility]  Progress Note    Patient Name: Umair Kenney  MRN: [id number]  Patient Class: IP- Inpatient   Admission Date: 9/10/2023  Length of Stay: 6 days  Attending Physician: Osmin Zambrano MD  Primary Care Provider: Stacia, Primary Doctor        Subjective:     Principal Problem:Severe sepsis        HPI:  63-year-old male with pmh of HTN and hx of PNA at age 17 and 55 that presents after returning from a [employer] cruise to [place] for 1 week. Prior to leaving [place] on the cruise, he starting to feel sick. He began having fever, chills, and ?bloody productive cough after leaving. The cruise doctor treated him for URI and bronchitis with zpac/cough suppressant. Throughout the trip, his symptoms progressively worsened and began having diarrhea (10 times daily) and shortness of breath. He was seen again by the doctor this AM and found to have PNA on CXR. Complaints of orthopnea, PND, and apnea at night. Denies any sick contacts or anyone else on cruise getting sick.     Patient's BP was in the 50s/40s on arrival per EMS. He was given a 1L of fluid and satting in the low 90s on 5L NC. Due to his acidemia and RR in the 30s on arrival, he was put on BIPAP and weaned to comfort flow satting 95-96%.     ED: afebrile, -110s, WBC 23, Na 128, K 3.2, bicarb 16, anion gap 20, Cr 3.5, AST//89  Procal 26, VBG - ph 7.2, CO2 49, HCO3 20.5, lactic 5.78, point of care lactic 1.85  CXR - R lung patchy opacities > L lung    Started on IV vanc/cefepime/azithromycin and admitted to medicine.            Overview/Hospital Course:  Patient admitted to MICU for increased work of breathing secondary to pneumonia. His legionella studies are pending. Initially treated with azithromycin, cefepime, and vancomycin and narrowed to ceftriaxone and azithromycin on 9/12/23. O2 requirements improving. Patient is not tolerating BiPAP overnight. Since admission, he has had worsening  LFTs with workup including liver ultrasound and hepatitis panel.      Interval History: Clinically unchanged. 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    Review of Systems   Constitutional:  Positive for fatigue. Negative for chills and fever.   Respiratory:  Positive for cough, shortness of breath and wheezing.    Cardiovascular:  Negative for leg swelling.   Gastrointestinal:  Negative for abdominal pain, diarrhea, nausea and vomiting.   Genitourinary:  Negative for dysuria.   Musculoskeletal:  Negative for back pain.   Skin:  Negative for rash.   Neurological:  Negative for headaches.   Psychiatric/Behavioral:  Negative for confusion.      Objective:     Vital Signs (Most Recent):  Temp: 98.3 °F (36.8 °C) (09/16/23 1539)  Pulse: 79 (09/16/23 1605)  Resp: (!) 22 (09/16/23 1605)  BP: (!) 155/77 (09/16/23 1539)  SpO2: 97 % (09/16/23 1605) Vital Signs (24h Range):  Temp:  [97.7 °F (36.5 °C)-98.5 °F (36.9 °C)] 98.3 °F (36.8 °C)  Pulse:  [] 79  Resp:  [18-97] 22  SpO2:  [94 %-99 %] 97 %  BP: (143-168)/(77-87) 155/77     Weight: 128.8 kg (284 lb)  Body mass index is 41.94 kg/m².    Intake/Output Summary (Last 24 hours) at 9/16/2023 1742  Last data filed at 9/16/2023 1600  Gross per 24 hour   Intake 480 ml   Output 500 ml   Net -20 ml         Physical Exam  Constitutional:       Appearance: He is ill-appearing.   HENT:      Head: Normocephalic and atraumatic.   Eyes:      Extraocular Movements: Extraocular movements intact.      Pupils: Pupils are equal, round, and reactive to light.   Cardiovascular:      Rate and Rhythm: Regular rhythm.   Pulmonary:      Comments: crackles  Abdominal:      General: There is no distension.      Palpations: Abdomen is soft.      Tenderness: There is no abdominal tenderness.   Musculoskeletal:      Cervical back: Normal range of motion and neck supple.   Neurological:      General: No focal deficit present.      Mental Status: He is oriented to person,  place, and time.             Significant Labs: All pertinent labs within the past 24 hours have been reviewed.  BMP:   Recent Labs   Lab 09/16/23  0559   *      K 5.0      CO2 25   BUN 40*   CREATININE 1.2   CALCIUM 9.2   MG 1.8     CBC:   Recent Labs   Lab 09/15/23  0509 09/16/23  0559   WBC 14.46* 16.40*   HGB 12.7* 12.7*   HCT 37.3* 37.5*    460*       Significant Imaging: I have reviewed all pertinent imaging results/findings within the past 24 hours.      Assessment/Plan:      * Severe sepsis  This patient does have evidence of infective focus  My overall impression is sepsis.  Source: Respiratory  Antibiotics given-   Antibiotics (72h ago, onward)    Start     Stop Route Frequency Ordered    09/12/23 2100  cefTRIAXone (ROCEPHIN) 2 g in dextrose 5 % in water (D5W) 100 mL IVPB (MB+)         -- IV Every 24 hours (non-standard times) 09/12/23 1051    09/10/23 2230  mupirocin 2 % ointment         09/15/23 2059 Nasl 2 times daily 09/10/23 2123        Organ dysfunction indicated by Acute liver injury, Acute respiratory failure and transaminitis     Fluid challenge Actual Body weight- Patient will receive 30ml/kg actual body weight to calculate fluid bolus for treatment of septic shock.     Will Not start Pressors- Levophed for MAP of 65    Afebrile and satting on the 90s with RR in 30s on arrival  -WBC 23, RR >22, lactic 5.88 improved to 1.85 with fluids, procal 26.88  -Antibiotics have been de-escalated to ceftriaxone  -needed BIPAP and weaned to comfort flow    COPD exacerbation  -Add bero  -Continue nebulizers and steroids      Rhinovirus        Severe obesity (BMI >= 40)  Body mass index is 41.94 kg/m². Morbid obesity complicates all aspects of disease management from diagnostic modalities to treatment. Weight loss encouraged and health benefits explained to patient.         DANNI (acute kidney injury)  Patient with acute kidney injury/acute renal failure likely due to pre-renal azotemia  due to dehydration DANNI is currently stable. Baseline creatinine unknown - Labs reviewed- Renal function/electrolytes with Estimated Creatinine Clearance: 84.8 mL/min (based on SCr of 1.2 mg/dL). according to latest data.   -Give gentle hydration, insensible water loss from his underlying resp issues    Transaminitis  -Likely inflammatory response to viral infection  -US RUQ showing hepatomegaly with steatosis, possibly MEZA  -monitor    HTN (hypertension)  Resume home amlodipine and losartan for now  Triamterene/HCTZ as resume as clinically indicated    Community acquired bacterial pneumonia  Rhinovirus with secondary bacterial pneumonia  CXR: RML and bilateral base consolidations  CT chest: several patchy foci of ground-glass/consolidation bilaterally, suggestive of evolving infectious or inflammatory process    -Continued to require 5L of O2  -Nebulizer treatment  -Antibiotics narrowed to ceftriaxone   -Continue steroids  -ID consulted to assist with further management        Acute respiratory failure with hypoxia and hypercarbia  Patient with Hypercapnic and Hypoxic Respiratory failure which is Acute.  he is not on home oxygen. Supplemental oxygen was provided and noted-      Signs/symptoms of respiratory failure include- increased work of breathing, respiratory distress and wheezing. Contributing diagnoses includes     -Weaned off BIPAP  -Satting 95-96% on 5L  -Add Prednisone  -Consult pulmonary  -Cont to wean as tolerated      VTE Risk Mitigation (From admission, onward)         Ordered     IP VTE HIGH RISK PATIENT  Once         09/12/23 1958     Place sequential compression device  Until discontinued         09/12/23 1958     heparin (porcine) injection 7,500 Units  Every 8 hours         09/11/23 1644                Discharge Planning   CB: 9/18/2023     Code Status: Full Code   Is the patient medically ready for discharge?: No    Reason for patient still in hospital (select all that apply):  Treatment  Discharge Plan A: Rehab   Discharge Delays: None known at this time              Osmin Zambrano MD  Department of Hospital Medicine   [facility] - Intensive Care ([place]

## 2023-09-16 NOTE — PT/OT/SLP PROGRESS
Occupational Therapy   Treatment    Name: Umair Kenney  MRN: [id number]  Admitting Diagnosis:  Severe sepsis       Recommendations:     Discharge Recommendations: rehabilitation facility  Discharge Equipment Recommendations:  to be determined by next level of care  Barriers to discharge:  None    Assessment:     Umair Kenney is a 63 y.o. male with a medical diagnosis of Severe sepsis.  He presents with deficits in self-care tasks as well as mobility and endurance. Pt. Participated well in session. Pt. Contyinues to require increased 02 at this time and fatigues very easily with activity. Patient would benefit from continued OT services to maximize level of safety and independence with self-care tasks.    . Performance deficits affecting function are weakness, impaired endurance, impaired self care skills, impaired functional mobility, gait instability, impaired cardiopulmonary response to activity.     Rehab Prognosis:  Good; patient would benefit from acute skilled OT services to address these deficits and reach maximum level of function.       Plan:     Patient to be seen 3 x/week to address the above listed problems via self-care/home management, therapeutic activities, therapeutic exercises  Plan of Care Expires:    Plan of Care Reviewed with: patient    Subjective     Chief Complaint: I feel like I am at about 60%  Patient/Family Comments/goals: to get better   Pain/Comfort:  Pain Rating 1: 0/10  Pain Rating Post-Intervention 1: 0/10    Objective:     Communicated with: nurse prior to session.  Patient found up in chair with oxygen, pulse ox (continuous), telemetry upon OT entry to room.    General Precautions: Standard, droplet, fall    Orthopedic Precautions:N/A  Braces: N/A  Respiratory Status: High flow, flow 6 L/min,    Occupational Performance:     Bed Mobility:    Not tested as pt. Was up in chair     Functional Mobility/Transfers:  Patient completed Sit <> Stand Transfer with contact guard assistance   with  no assistive device   Functional Mobility: Pt. Ambulated ~ 20 feet in room with CGA    Activities of Daily Living:  Not performed      [facility] 6 Click ADL: 20    Treatment & Education:  Pt. Performed 1 set 10 reps of BUE/BLE AROM there ex seated in bedside chair for all major planes of motion. Rest breaks were provided as needed    Patient left up in chair with all lines intact and call button in reach    GOALS:   Multidisciplinary Problems       Occupational Therapy Goals          Problem: Occupational Therapy    Goal Priority Disciplines Outcome Interventions   Occupational Therapy Goal     OT, PT/OT Ongoing, Progressing    Description: Goals to be met by: 7 days 9/19/23     Patient will increase functional independence with ADLs by performing:    Pt to complete UE dressing with set-up  Pt to complete LE dressing with SBA  Pt to complete toileting with SBA  Pt to complete standing g/h skills with SBA  Pt to complete t/f bed, chair and commode with SBA                        Time Tracking:     OT Date of Treatment: 09/16/23  OT Start Time: 1326  OT Stop Time: 1356  OT Total Time (min): 30 min    Billable Minutes:Therapeutic Activity 15  Therapeutic Exercise 15    OT/JENNIFFER: OT     Number of JENNIFFER visits since last OT visit: 1    9/16/2023

## 2023-09-16 NOTE — HPI
"A 63-year-old obese man with BMI 42, HTN, past Covid-19 infection, and history of pneumonia at the age of 17 plus 56 requiring bronchoscopy who developed malaise, nasal congestion, runny nose and chills while travelling to [place] from [place] for a cruise. Unfortunately, his symptoms progressed and he developed fever and a cough. He saw the ship doctor and was treated with a Z-pack plus cough suppressants. He then had diarrhea, frequency, and progressively worsening shortness of breath prompting re-evaluation by the ship doctor. CXR at the time was consistent with pneumonia. He was brought to [facility] for further management at which time he was noted to be hypotensive and with oxygen saturation rate in the 90's while on 5LNC. Laboratory work up revealed leukocytosis, elevated creatinine levels, elevated LFT's, and elevated lactic acid levels. He was admitted to the ICU for further management.     Mr. Kenney is from [place], [place]. He does not hunt, fish or garden. He has a pet dog. No known sick contacts or travel prior to his Cruise. He is a retired contractor. No toxic habits.     ID consulted for "Rhinovirus, secondary bacteria pneumonia  "

## 2023-09-16 NOTE — SUBJECTIVE & OBJECTIVE
Interval History: Clinically unchanged. 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    Review of Systems   Constitutional:  Positive for fatigue. Negative for chills and fever.   Respiratory:  Positive for cough, shortness of breath and wheezing.    Cardiovascular:  Negative for leg swelling.   Gastrointestinal:  Negative for abdominal pain, diarrhea, nausea and vomiting.   Genitourinary:  Negative for dysuria.   Musculoskeletal:  Negative for back pain.   Skin:  Negative for rash.   Neurological:  Negative for headaches.   Psychiatric/Behavioral:  Negative for confusion.      Objective:     Vital Signs (Most Recent):  Temp: 98.3 °F (36.8 °C) (09/16/23 1539)  Pulse: 79 (09/16/23 1605)  Resp: (!) 22 (09/16/23 1605)  BP: (!) 155/77 (09/16/23 1539)  SpO2: 97 % (09/16/23 1605) Vital Signs (24h Range):  Temp:  [97.7 °F (36.5 °C)-98.5 °F (36.9 °C)] 98.3 °F (36.8 °C)  Pulse:  [] 79  Resp:  [18-97] 22  SpO2:  [94 %-99 %] 97 %  BP: (143-168)/(77-87) 155/77     Weight: 128.8 kg (284 lb)  Body mass index is 41.94 kg/m².    Intake/Output Summary (Last 24 hours) at 9/16/2023 1742  Last data filed at 9/16/2023 1600  Gross per 24 hour   Intake 480 ml   Output 500 ml   Net -20 ml         Physical Exam  Constitutional:       Appearance: He is ill-appearing.   HENT:      Head: Normocephalic and atraumatic.   Eyes:      Extraocular Movements: Extraocular movements intact.      Pupils: Pupils are equal, round, and reactive to light.   Cardiovascular:      Rate and Rhythm: Regular rhythm.   Pulmonary:      Comments: crackles  Abdominal:      General: There is no distension.      Palpations: Abdomen is soft.      Tenderness: There is no abdominal tenderness.   Musculoskeletal:      Cervical back: Normal range of motion and neck supple.   Neurological:      General: No focal deficit present.      Mental Status: He is oriented to person, place, and time.             Significant Labs: All pertinent labs within  the past 24 hours have been reviewed.  BMP:   Recent Labs   Lab 09/16/23  0559   *      K 5.0      CO2 25   BUN 40*   CREATININE 1.2   CALCIUM 9.2   MG 1.8     CBC:   Recent Labs   Lab 09/15/23  0509 09/16/23  0559   WBC 14.46* 16.40*   HGB 12.7* 12.7*   HCT 37.3* 37.5*    460*       Significant Imaging: I have reviewed all pertinent imaging results/findings within the past 24 hours.

## 2023-09-17 LAB
ALBUMIN SERPL BCP-MCNC: 2.3 G/DL (ref 3.5–5.2)
ALP SERPL-CCNC: 131 U/L (ref 55–135)
ALT SERPL W/O P-5'-P-CCNC: 178 U/L (ref 10–44)
ANION GAP SERPL CALC-SCNC: 12 MMOL/L (ref 8–16)
ANISOCYTOSIS BLD QL SMEAR: SLIGHT
AST SERPL-CCNC: 94 U/L (ref 10–40)
BASOPHILS NFR BLD: 0 % (ref 0–1.9)
BILIRUB SERPL-MCNC: 0.4 MG/DL (ref 0.1–1)
BUN SERPL-MCNC: 37 MG/DL (ref 8–23)
CALCIUM SERPL-MCNC: 9 MG/DL (ref 8.7–10.5)
CHLORIDE SERPL-SCNC: 106 MMOL/L (ref 95–110)
CO2 SERPL-SCNC: 22 MMOL/L (ref 23–29)
CREAT SERPL-MCNC: 1.1 MG/DL (ref 0.5–1.4)
DIFFERENTIAL METHOD: ABNORMAL
EOSINOPHIL NFR BLD: 0 % (ref 0–8)
ERYTHROCYTE [DISTWIDTH] IN BLOOD BY AUTOMATED COUNT: 15.1 % (ref 11.5–14.5)
EST. GFR  (NO RACE VARIABLE): >60 ML/MIN/1.73 M^2
GLUCOSE SERPL-MCNC: 102 MG/DL (ref 70–110)
HCT VFR BLD AUTO: 39.3 % (ref 40–54)
HGB BLD-MCNC: 12.7 G/DL (ref 14–18)
IMM GRANULOCYTES # BLD AUTO: ABNORMAL K/UL (ref 0–0.04)
IMM GRANULOCYTES NFR BLD AUTO: ABNORMAL % (ref 0–0.5)
LYMPHOCYTES NFR BLD: 7 % (ref 18–48)
MAGNESIUM SERPL-MCNC: 1.6 MG/DL (ref 1.6–2.6)
MCH RBC QN AUTO: 31.5 PG (ref 27–31)
MCHC RBC AUTO-ENTMCNC: 32.3 G/DL (ref 32–36)
MCV RBC AUTO: 98 FL (ref 82–98)
MONOCYTES NFR BLD: 2 % (ref 4–15)
MYELOCYTES NFR BLD MANUAL: 2 %
NEUTROPHILS NFR BLD: 84 % (ref 38–73)
NEUTS BAND NFR BLD MANUAL: 5 %
NRBC BLD-RTO: 0 /100 WBC
OVALOCYTES BLD QL SMEAR: ABNORMAL
PHOSPHATE SERPL-MCNC: 3.6 MG/DL (ref 2.7–4.5)
PLATELET # BLD AUTO: 441 K/UL (ref 150–450)
PLATELET BLD QL SMEAR: ABNORMAL
PMV BLD AUTO: 10.2 FL (ref 9.2–12.9)
POCT GLUCOSE: 114 MG/DL (ref 70–110)
POCT GLUCOSE: 151 MG/DL (ref 70–110)
POCT GLUCOSE: 68 MG/DL (ref 70–110)
POIKILOCYTOSIS BLD QL SMEAR: SLIGHT
POTASSIUM SERPL-SCNC: 5 MMOL/L (ref 3.5–5.1)
PROT SERPL-MCNC: 7.6 G/DL (ref 6–8.4)
RBC # BLD AUTO: 4.03 M/UL (ref 4.6–6.2)
SODIUM SERPL-SCNC: 140 MMOL/L (ref 136–145)
WBC # BLD AUTO: 11.7 K/UL (ref 3.9–12.7)

## 2023-09-17 PROCEDURE — 87385 HISTOPLASMA CAPSUL AG IA: CPT | Performed by: INTERNAL MEDICINE

## 2023-09-17 PROCEDURE — 85007 BL SMEAR W/DIFF WBC COUNT: CPT

## 2023-09-17 PROCEDURE — 87899 AGENT NOS ASSAY W/OPTIC: CPT | Performed by: INTERNAL MEDICINE

## 2023-09-17 PROCEDURE — 93005 ELECTROCARDIOGRAM TRACING: CPT

## 2023-09-17 PROCEDURE — 25000003 PHARM REV CODE 250

## 2023-09-17 PROCEDURE — 93010 ELECTROCARDIOGRAM REPORT: CPT | Mod: ,,, | Performed by: INTERNAL MEDICINE

## 2023-09-17 PROCEDURE — 36415 COLL VENOUS BLD VENIPUNCTURE: CPT

## 2023-09-17 PROCEDURE — 20600001 HC STEP DOWN PRIVATE ROOM

## 2023-09-17 PROCEDURE — 99223 1ST HOSP IP/OBS HIGH 75: CPT | Mod: ,,, | Performed by: EMERGENCY MEDICINE

## 2023-09-17 PROCEDURE — 27000207 HC ISOLATION

## 2023-09-17 PROCEDURE — 94640 AIRWAY INHALATION TREATMENT: CPT

## 2023-09-17 PROCEDURE — 93010 EKG 12-LEAD: ICD-10-PCS | Mod: ,,, | Performed by: INTERNAL MEDICINE

## 2023-09-17 PROCEDURE — 85027 COMPLETE CBC AUTOMATED: CPT

## 2023-09-17 PROCEDURE — 87449 NOS EACH ORGANISM AG IA: CPT | Performed by: INTERNAL MEDICINE

## 2023-09-17 PROCEDURE — 63600175 PHARM REV CODE 636 W HCPCS: Performed by: INTERNAL MEDICINE

## 2023-09-17 PROCEDURE — 25000242 PHARM REV CODE 250 ALT 637 W/ HCPCS: Performed by: INTERNAL MEDICINE

## 2023-09-17 PROCEDURE — 99232 SBSQ HOSP IP/OBS MODERATE 35: CPT | Mod: ,,, | Performed by: INTERNAL MEDICINE

## 2023-09-17 PROCEDURE — 84100 ASSAY OF PHOSPHORUS: CPT

## 2023-09-17 PROCEDURE — 94761 N-INVAS EAR/PLS OXIMETRY MLT: CPT

## 2023-09-17 PROCEDURE — 25000003 PHARM REV CODE 250: Performed by: INTERNAL MEDICINE

## 2023-09-17 PROCEDURE — 80053 COMPREHEN METABOLIC PANEL: CPT

## 2023-09-17 PROCEDURE — 99232 PR SUBSEQUENT HOSPITAL CARE,LEVL II: ICD-10-PCS | Mod: ,,, | Performed by: INTERNAL MEDICINE

## 2023-09-17 PROCEDURE — 63600175 PHARM REV CODE 636 W HCPCS

## 2023-09-17 PROCEDURE — 83735 ASSAY OF MAGNESIUM: CPT

## 2023-09-17 PROCEDURE — 99223 PR INITIAL HOSPITAL CARE,LEVL III: ICD-10-PCS | Mod: ,,, | Performed by: EMERGENCY MEDICINE

## 2023-09-17 PROCEDURE — 27000221 HC OXYGEN, UP TO 24 HOURS

## 2023-09-17 PROCEDURE — 25000242 PHARM REV CODE 250 ALT 637 W/ HCPCS

## 2023-09-17 PROCEDURE — 99900035 HC TECH TIME PER 15 MIN (STAT)

## 2023-09-17 RX ORDER — LOSARTAN POTASSIUM 50 MG/1
100 TABLET ORAL NIGHTLY
Status: DISCONTINUED | OUTPATIENT
Start: 2023-09-17 | End: 2023-09-20 | Stop reason: HOSPADM

## 2023-09-17 RX ORDER — ATORVASTATIN CALCIUM 10 MG/1
10 TABLET, FILM COATED ORAL DAILY
Status: DISCONTINUED | OUTPATIENT
Start: 2023-09-17 | End: 2023-09-20 | Stop reason: HOSPADM

## 2023-09-17 RX ORDER — ISOSORBIDE MONONITRATE 30 MG/1
30 TABLET, EXTENDED RELEASE ORAL DAILY
Status: DISCONTINUED | OUTPATIENT
Start: 2023-09-17 | End: 2023-09-17

## 2023-09-17 RX ORDER — TRIAMTERENE AND HYDROCHLOROTHIAZIDE 75; 50 MG/1; MG/1
1 TABLET ORAL EVERY MORNING
Status: DISCONTINUED | OUTPATIENT
Start: 2023-09-17 | End: 2023-09-17

## 2023-09-17 RX ORDER — MORPHINE SULFATE 2 MG/ML
1 INJECTION, SOLUTION INTRAMUSCULAR; INTRAVENOUS EVERY 4 HOURS PRN
Status: DISCONTINUED | OUTPATIENT
Start: 2023-09-17 | End: 2023-09-20 | Stop reason: HOSPADM

## 2023-09-17 RX ADMIN — LEVALBUTEROL 1.25 MG: 1.25 SOLUTION, CONCENTRATE RESPIRATORY (INHALATION) at 08:09

## 2023-09-17 RX ADMIN — LOSARTAN POTASSIUM 100 MG: 50 TABLET, FILM COATED ORAL at 10:09

## 2023-09-17 RX ADMIN — LEVALBUTEROL 1.25 MG: 1.25 SOLUTION, CONCENTRATE RESPIRATORY (INHALATION) at 03:09

## 2023-09-17 RX ADMIN — ATORVASTATIN CALCIUM 10 MG: 10 TABLET, FILM COATED ORAL at 08:09

## 2023-09-17 RX ADMIN — IPRATROPIUM BROMIDE 0.5 MG: 0.5 SOLUTION RESPIRATORY (INHALATION) at 03:09

## 2023-09-17 RX ADMIN — ACETAMINOPHEN 650 MG: 325 TABLET ORAL at 04:09

## 2023-09-17 RX ADMIN — FLUTICASONE FUROATE AND VILANTEROL TRIFENATATE 1 PUFF: 100; 25 POWDER RESPIRATORY (INHALATION) at 08:09

## 2023-09-17 RX ADMIN — LEVALBUTEROL 1.25 MG: 1.25 SOLUTION, CONCENTRATE RESPIRATORY (INHALATION) at 12:09

## 2023-09-17 RX ADMIN — AMLODIPINE BESYLATE 10 MG: 10 TABLET ORAL at 08:09

## 2023-09-17 RX ADMIN — ISOSORBIDE MONONITRATE 30 MG: 30 TABLET, EXTENDED RELEASE ORAL at 08:09

## 2023-09-17 RX ADMIN — IPRATROPIUM BROMIDE 0.5 MG: 0.5 SOLUTION RESPIRATORY (INHALATION) at 08:09

## 2023-09-17 RX ADMIN — PREDNISONE 40 MG: 20 TABLET ORAL at 08:09

## 2023-09-17 RX ADMIN — IPRATROPIUM BROMIDE 0.5 MG: 0.5 SOLUTION RESPIRATORY (INHALATION) at 11:09

## 2023-09-17 RX ADMIN — HEPARIN SODIUM 7500 UNITS: 5000 INJECTION INTRAVENOUS; SUBCUTANEOUS at 02:09

## 2023-09-17 RX ADMIN — LEVALBUTEROL 1.25 MG: 1.25 SOLUTION, CONCENTRATE RESPIRATORY (INHALATION) at 11:09

## 2023-09-17 RX ADMIN — MORPHINE SULFATE 1 MG: 2 INJECTION, SOLUTION INTRAMUSCULAR; INTRAVENOUS at 11:09

## 2023-09-17 RX ADMIN — HEPARIN SODIUM 7500 UNITS: 5000 INJECTION INTRAVENOUS; SUBCUTANEOUS at 10:09

## 2023-09-17 RX ADMIN — IPRATROPIUM BROMIDE 0.5 MG: 0.5 SOLUTION RESPIRATORY (INHALATION) at 12:09

## 2023-09-17 RX ADMIN — LOPERAMIDE HYDROCHLORIDE 2 MG: 2 CAPSULE ORAL at 11:09

## 2023-09-17 RX ADMIN — HEPARIN SODIUM 7500 UNITS: 5000 INJECTION INTRAVENOUS; SUBCUTANEOUS at 06:09

## 2023-09-17 RX ADMIN — BENZONATATE 200 MG: 100 CAPSULE ORAL at 08:09

## 2023-09-17 RX ADMIN — CEFTRIAXONE 2 G: 2 INJECTION, POWDER, FOR SOLUTION INTRAMUSCULAR; INTRAVENOUS at 08:09

## 2023-09-17 RX ADMIN — BENZONATATE 200 MG: 100 CAPSULE ORAL at 02:09

## 2023-09-17 NOTE — ASSESSMENT & PLAN NOTE
"63M with h/o htn and obesity (BMI 42) admitted 9/12 with acute on chronic respiratory failure; baseline severe MOTLEY and orthopnea. Traveling from [place] to go on a cruise and symptoms started prior to getting to [place] or going on cruise. CT- ground glass consolidation b/l. procal elevated. resp culture- normal luther, rare wbc. 2d echo with PAP 34. Hiv, hep c negReceived 3d azithromycin and then cefepime or ceftriaxone since admit. Wbc 16k 84% gran. Lft downtrending. ID consulted for "Rhinovirus, secondary bacteria pneumonia    Unusual to be this sick from rhinovirus. Agree he likely has another process going on, possible bacterial or fungal infection. Has already received abx for atypical pna.  Primary team ruling out malignancy. Unclear cause of chronic resp symptoms; obesity hypoventilation? pulm htn? Severe markos?    Recommendations:   - continue ceftriaxone  - f/u pending fungal studies-  histo, blasto pending (crypto negative)  - doubt TB, but NTM in ddx, send AFB smear/culture  - karius testing ordered, please make sure it is collected  - non-infectious work up by primary team/pulm  - not opposed to trial of steroids if recommended by pulm  - pulm was deferring bronch, but would reconsider given that pt unable to produce sputum     BAL studies:   - cell count with diff  - gram stain, bacterial / legionella / fungal / AFB / modified AFB for nocardia  - TEM-respiratory pathogens panel  - Aspergillus Ag  - MTB PCR  - Pneumocystis PCR  - CMV PCR  - miscellaneous send out - [id number]   "

## 2023-09-17 NOTE — ASSESSMENT & PLAN NOTE
Rhinovirus with secondary bacterial pneumonia  CXR: RML and bilateral base consolidations  CT chest: several patchy foci of ground-glass/consolidation bilaterally, suggestive of evolving infectious or inflammatory process    -Continued to require 5L of O2  -Nebulizer treatment  -Antibiotics narrowed to ceftriaxone, started 09/12  -Continue steroids  -ID consulting: fungal crypto, histo, blasto and AFB ordered  -karius testing ordered

## 2023-09-17 NOTE — PLAN OF CARE
Pt A&Ox4. Denies chest pain and SOB. VSS, afebrile, 5L O2, NSR. Up SBA. Adequate output overnight. No changes overnight, continuing POC.

## 2023-09-17 NOTE — CONSULTS
"[facility] - [facility] ([place] [address])  Infectious Disease  Consult Note    Patient Name: Umair Kenney  MRN: [id number]  Admission Date: 9/10/2023  Hospital Length of Stay: 6 days  Attending Physician: Osmin Zambrano MD  Primary Care Provider: Stacia, Primary Doctor     Isolation Status: Droplet    Patient information was obtained from patient and ER records.      Inpatient consult to Infectious Diseases  Consult performed by: Kristin Pelayo MD  Consult ordered by: Osmin Zambrano MD        Assessment/Plan:     Pulmonary  Pneumonia due to infectious organism  63M with h/o htn and obesity (BMI 42) admitted 9/12 with acute on chronic respiratory failure; baseline severe MOTLEY and orthopnea. Traveling from [place] to go on a cruise and symptoms started prior to getting to [place] or going on cruise. CT- ground glass consolidation b/l. procal elevated. resp culture- normal luther, rare wbc. 2d echo with PAP 34. Hiv, hep c negReceived 3d azithromycin and then cefepime or ceftriaxone since admit. Wbc 16k 84% gran. Lft downtrending. ID consulted for "Rhinovirus, secondary bacteria pneumonia    Unusual to be this sick from rhinovirus. Agree he likely has another process going on, possible bacterial or fungal infection. Has already received abx for atypical pna.  Primary team ruling out malignancy. Unclear cause of chronic resp symptoms; pulm htn?    Recommendations:   - continue ceftriaxone  - fungal studies ordered- crypto, histo, blasto  - doubt TB, but NTM in ddx, send AFB smear/culture  - karius testing ordered  - non-infectious work up by primary team/pulm  - not opposed to trial of steroids if recommended by pulm  - f/u pulm plan for bronchoscopy    BAL studies:   - cell count with diff  - gram stain, bacterial / legionella / fungal / AFB / modified AFB for nocardia  - TEM-respiratory pathogens panel  - Aspergillus Ag  - MTB PCR  - Pneumocystis PCR  - CMV PCR  - miscellaneous send out - [id number] send to " [facility]         Thank you for your consult. I will follow-up with patient. Please contact us if you have any additional questions.    Kristin Pelayo MD  Infectious Disease  [facility] - Intensive Care ([facility])    Subjective:     Principal Problem: Severe sepsis    HPI:   63M with h/o htn and obesity (BMI 42) admitted 9/12 with shortness of breath. Says he can hardly make it to bathroom without sob. Reports traveling to [place] 9/1 to leave on a cruise, but already wasn't feeling well. Reports breathing got worse over the coming days associated with runny nose, congestion, chills. Then went on a 7d cruise to [place] ([facility]) and only got off the boat once because he was feeling so badly. Was given z-pack, no relief. Reports wife is also sick with runny nose/congestion, but doesn't have resp issues. Reports being sick 7 years ago and at 16yo with similar symptoms. Reports having a bronchoscopy before (and thinks it made him feel better), but denies prior dx of resp issues. 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 Says he used to try to stay away from dust, but not always posisble. Has a dog. Denies tobacco. From [place], [place]. Denies abx allergies. Denies indwelling hardware. prior to feeling sick this time, he reports his baseline includes unable to lay flat without sob (sleeps sitting up) and gets short winded with exertion.     Ct chest  Interval improved aeration of the right lower lobe noting residual patchy ground-glass and consolidation remains.  There is a small right pleural effusion, similar to the previous exam.    There are several additional patchy foci of ground-glass/consolidation bilaterally, some of which appears stable, some of which have increased since the previous exam.  Findings suggest evolving infectious or inflammatory process, continued follow-up is advised to exclude underlying malignancy.  Scattered tree-in-bud type  "nodules suggest infectious or inflammatory process.    Respiratory Culture Normal respiratory luther    Respiratory Culture No S aureus or Pseudomonas isolated.    Gram Stain (Respiratory) <10 epithelial cells per low power field.    Gram Stain (Respiratory) Rare WBC's        Human Rhinovirus/Enterovirus Not Detected Detected Abnormal         Echo-    Left Ventricle: The left ventricle is normal in size. Normal wall thickness. Normal wall motion. There is normal systolic function with a visually estimated ejection fraction of 55 - 60%. There is normal diastolic function.    Right Ventricle: Mild right ventricular enlargement. Systolic function is mildly reduced.    Pulmonary Artery: The estimated pulmonary artery systolic pressure is 34 mmHg.    IVC/SVC: Intermediate venous pressure at 8 mmHg.    Aorta: Aortic root is mildly dilated measuring 4.4 cm. Ascending aorta is normal.      Hiv, hep c neg    Received 3d azithromycin and then cefepime or ceftriaxone since admit    Wbc 16k 84% gran    Procal 27    Lft downtrending    ID consulted for "Rhinovirus, secondary bacteria pneumonia      Past Medical History:   Diagnosis Date    Hypertension        History reviewed. No pertinent surgical history.    Review of patient's allergies indicates:  No Known Allergies    No current facility-administered medications on file prior to encounter.     Current Outpatient Medications on File Prior to Encounter   Medication Sig    acetaminophen (TYLENOL) 500 MG tablet Take 1,000 mg by mouth 2 (two) times daily as needed for Pain.    amLODIPine (NORVASC) 10 MG tablet Take 10 mg by mouth once daily.    atorvastatin (LIPITOR) 10 MG tablet Take 10 mg by mouth once daily.    fluticasone propionate (FLONASE) 50 mcg/actuation nasal spray 1 spray by Each Nostril route daily as needed for Allergies.    losartan (COZAAR) 100 MG tablet Take 100 mg by mouth every evening.    testosterone cypionate (DEPOTESTOTERONE CYPIONATE) 200 mg/mL " injection Inject 0.7 mLs into the muscle once a week.    triamterene-hydrochlorothiazide 75-50 mg (MAXZIDE) 75-50 mg per tablet Take 1 tablet by mouth every morning.     Family History    None       Tobacco Use    Smoking status: Never    Smokeless tobacco: Never   Substance and Sexual Activity    Alcohol use: Never    Drug use: Never    Sexual activity: Not on file     Review of Systems   Constitutional:  Positive for chills, fatigue and fever.   HENT: Negative.     Respiratory:  Positive for cough, shortness of breath and wheezing.    Cardiovascular:  Negative for chest pain and leg swelling.   Gastrointestinal:  Positive for diarrhea. Negative for abdominal distention, abdominal pain, nausea and vomiting.   Genitourinary: Negative.    Neurological: Negative.      Objective:     Vital Signs (Most Recent):  Temp: 98.3 °F (36.8 °C) (09/16/23 1539)  Pulse: 79 (09/16/23 1605)  Resp: (!) 22 (09/16/23 1605)  BP: (!) 155/77 (09/16/23 1539)  SpO2: 97 % (09/16/23 1605) Vital Signs (24h Range):  Temp:  [97.7 °F (36.5 °C)-98.5 °F (36.9 °C)] 98.3 °F (36.8 °C)  Pulse:  [68-96] 79  Resp:  [18-97] 22  SpO2:  [94 %-99 %] 97 %  BP: (143-168)/(77-87) 155/77     Weight: 128.8 kg (284 lb)  Body mass index is 41.94 kg/m².     Physical Exam  Constitutional:       Appearance: He is obese. He is ill-appearing.   HENT:      Mouth/Throat:      Mouth: Mucous membranes are moist.      Pharynx: Oropharynx is clear.   Eyes:      Extraocular Movements: Extraocular movements intact.   Cardiovascular:      Rate and Rhythm: Normal rate and regular rhythm.      Pulses: Normal pulses.      Heart sounds: No murmur heard.     No gallop.   Pulmonary:      Effort: Respiratory distress present.      Breath sounds: Wheezing present. No rales.   Abdominal:      General: There is distension.      Tenderness: There is no abdominal tenderness. There is no guarding.   Skin:     General: Skin is warm.      Capillary Refill: Capillary refill takes less  than 2 seconds.      Coloration: Skin is not jaundiced.      Findings: No bruising.   Neurological:      General: No focal deficit present.      Mental Status: He is oriented to person, place, and time.   Psychiatric:         Mood and Affect: Mood normal.                Significant Labs: All pertinent labs within the past 24 hours have been reviewed.    Significant Imaging: I have reviewed all pertinent imaging results/findings within the past 24 hours.  I have reviewed and interpreted all pertinent imaging results/findings within the past 24 hours.

## 2023-09-17 NOTE — RESPIRATORY THERAPY
"RAPID RESPONSE RESPIRATORY THERAPY PROACTIVE ROUNDING NOTE             Time of visit: 09     Code Status: Full Code   : 1960  Bed: 53119/00439 A:   MRN: [id number]  Time spent at the bedside: < 15 min    SITUATION    Evaluated patient for: HFNC Compliance     BACKGROUND    Patient has a past medical history of Hypertension.    24 Hours Vitals Range:  Temp:  [97.7 °F (36.5 °C)-98.3 °F (36.8 °C)]   Pulse:  [69-86]   Resp:  [18-22]   BP: (151-173)/(77-96)   SpO2:  [93 %-98 %]     Labs:    Recent Labs     09/15/23  0509 23  0559 23  0417    136 140   K 4.6 5.0 5.0   CL 99 102 106   CO2 27 25 22*   BUN 30* 40* 37*   CREATININE 1.2 1.2 1.1   GLU 96 129* 102   PHOS 3.1 3.7 3.6   MG 1.8 1.8 1.6        No results for input(s): "PH", "PCO2", "PO2", "HCO3", "POCSATURATED", "BE" in the last 72 hours.    ASSESSMENT/INTERVENTIONS    Patient resting comfortably. No respiratory concerns at this time.    Last VS   Temp: 98.2 °F (36.8 °C) ( 0750)  Pulse: 86 ( 0900)  Resp: 18 ( 0900)  BP: 173/96 ( 0750)  SpO2: 93 % (900)    Level of Consciousness: Level of Consciousness (AVPU): alert  Respiratory Effort: Respiratory Effort: Normal, Unlabored Expansion/Accessory Muscle Usage: Expansion/Accessory Muscles/Retractions: no use of accessory muscles, expansion symmetric, no retractions  All Lung Field Breath Sounds: All Lung Fields Breath Sounds: Posterior:, Lateral:, diminished, coarse  COLTEN Breath Sounds: coarse, diminished  LLL Breath Sounds: diminished  RUL Breath Sounds: clear  RML Breath Sounds: diminished  RLL Breath Sounds: diminished  O2 Device/Concentration: 5L NC  Was the O2 device able to be weaned? No  Ambu at bedside:      Active Orders   Respiratory Care    Incentive spirometry     Frequency: Q4H WAKE     Number of Occurrences: Until Specified    Inhalation Treatment Q4H     Frequency: Q4H     Number of Occurrences: Until Specified    Inhalation Treatment Q4H PRN     " Frequency: Q4H PRN     Number of Occurrences: Until Specified    Oxygen Continuous     Frequency: Continuous     Number of Occurrences: Until Specified     Order Questions:      Device type: Low flow      Device: Nasal Cannula (1- 5 Liters)      LPM: 1-5      Titrate O2 per Oxygen Titration Protocol: Yes      To maintain SpO2 goal of: >= 90%      Notify MD of: Inability to achieve desired SpO2; Sudden change in patient status and requires 20% increase in FiO2; Patient requires >60% FiO2    POCT Venous Blood Gas     Frequency: Once     Number of Occurrences: 1 Occurrences     Order Comments: This test should be used for VBGs.  If using this order for other tests (K, creatinine, HCT, PT/INR, lactate etc)  ONLY do so in the case of an emergency or rapid response.Notify Physician if: see parameters below.         Order Questions:      Component: Blood Gas    Pulse Oximetry Q4H     Frequency: Q4H     Number of Occurrences: Until Specified       RECOMMENDATIONS    We recommend: RRT Recs: Continue POC per primary team.      FOLLOW-UP    Please call back the Rapid Response RT, Amada Orozco RRT at x [phone] for any questions or concerns.

## 2023-09-17 NOTE — ASSESSMENT & PLAN NOTE
-Trending down  -Likely inflammatory response to viral infection  -US RUQ showing hepatomegaly with steatosis, possibly MEZA  -monitor

## 2023-09-17 NOTE — PLAN OF CARE
Patient is 63yr old male admitted for sepsis likely due to pneumonia. Patient is currently being treated with po steroids and iv abx. Patient has scheduled breathing treatments and was added on an inhaler on yesterday. Patient complained of chest pain and rib pain and morphine was given with moderate relief. Patient is still on 5L of high flow oxygen.   Problem: Adult Inpatient Plan of Care  Goal: Plan of Care Review  Outcome: Ongoing, Progressing  Flowsheets (Taken 9/17/2023 1716)  Plan of Care Reviewed With: patient     Problem: Infection Progression (Sepsis/Septic Shock)  Goal: Absence of Infection Signs and Symptoms  Outcome: Ongoing, Progressing  Intervention: Promote Recovery  Flowsheets (Taken 9/17/2023 1716)  Activity Management: Ambulated to bathroom - L4     Problem: Respiratory Compromise (Pneumonia)  Goal: Effective Oxygenation and Ventilation  Outcome: Ongoing, Progressing  Intervention: Optimize Oxygenation and Ventilation  Flowsheets (Taken 9/17/2023 1716)  Airway/Ventilation Management:   airway patency maintained   humidification applied  Head of Bed (HOB) Positioning: (Patient ambulating and sitting up in chair) other (see comments)

## 2023-09-17 NOTE — CONSULTS
"[facility] - [facility] ([facility]  Infectious Disease  Consult Note    Patient Name: Umair Kenney  MRN: [id number]  Admission Date: 9/10/2023  Hospital Length of Stay: 7 days  Attending Physician: Osmin Zambrano MD  Primary Care Provider: Stacia, Primary Doctor     Isolation Status: Droplet    Patient information was obtained from patient and ER records.      Consults  Assessment/Plan:     Pulmonary  Pneumonia due to infectious organism  63M with h/o htn and obesity (BMI 42) admitted 9/12 with acute on chronic respiratory failure; baseline severe MOTLEY and orthopnea. Traveling from [place] to go on a cruise and symptoms started prior to getting to [place] or going on cruise. CT- ground glass consolidation b/l. procal elevated. resp culture- normal luther, rare wbc. 2d echo with PAP 34. Hiv, hep c negReceived 3d azithromycin and then cefepime or ceftriaxone since admit. Wbc 16k 84% gran. Lft downtrending. ID consulted for "Rhinovirus, secondary bacteria pneumonia    Unusual to be this sick from rhinovirus. Agree he likely has another process going on, possible bacterial or fungal infection. Has already received abx for atypical pna.  Primary team ruling out malignancy. Unclear cause of chronic resp symptoms; obesity hypoventilation? pulm htn? Severe markos?    Recommendations:   - continue ceftriaxone  - f/u pending fungal studies-  histo, blasto pending (crypto negative)  - doubt TB, but NTM in ddx, send AFB smear/culture  - karius testing ordered, please make sure it is collected  - non-infectious work up by primary team/pulm  - not opposed to trial of steroids if recommended by pulm  - pulm was deferring bronch, but would reconsider given that pt unable to produce sputum     BAL studies:   - cell count with diff  - gram stain, bacterial / legionella / fungal / AFB / modified AFB for nocardia  - TEM-respiratory pathogens panel  - Aspergillus Ag  - MTB PCR  - Pneumocystis PCR  - CMV PCR  - miscellaneous send " [id number]         Thank you for your consult. I will follow-up with patient. Please contact us if you have any additional questions.    Kristin Pelayo MD  Infectious Disease  [facility] - Int[facility] ([facility])    Subjective:     Principal Problem: Severe sepsis    HPI:   63M with h/o htn and obesity (BMI 42) admitted 9/12 with shortness of breath. Says he can hardly make it to bathroom without sob. Reports traveling to [place] 9/1 to leave on a cruise, but already wasn't feeling well. Reports breathing got worse over the coming days associated with runny nose, congestion, chills. Then went on a 7d cruise to [place] ([facility]) and only got off the boat once because he was feeling so badly. Was given z-pack, no relief. Reports wife is also sick with runny nose/congestion, but doesn't have resp issues. Reports being sick 7 years ago and at 16yo with similar symptoms. Reports having a bronchoscopy before (and thinks it made him feel better), but denies prior dx of resp issues. 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 Says he used to try to stay away from dust, but not always posisble. Has a dog. Denies tobacco. From [place], [place]. Denies abx allergies. Denies indwelling hardware. prior to feeling sick this time, he reports his baseline includes unable to lay flat without sob (sleeps sitting up) and gets short winded with exertion.     Ct chest  Interval improved aeration of the right lower lobe noting residual patchy ground-glass and consolidation remains.  There is a small right pleural effusion, similar to the previous exam.    There are several additional patchy foci of ground-glass/consolidation bilaterally, some of which appears stable, some of which have increased since the previous exam.  Findings suggest evolving infectious or inflammatory process, continued follow-up is advised to exclude underlying malignancy.   "Scattered tree-in-bud type nodules suggest infectious or inflammatory process.    Respiratory Culture Normal respiratory luther    Respiratory Culture No S aureus or Pseudomonas isolated.    Gram Stain (Respiratory) <10 epithelial cells per low power field.    Gram Stain (Respiratory) Rare WBC's        Human Rhinovirus/Enterovirus Not Detected Detected Abnormal         Echo-    Left Ventricle: The left ventricle is normal in size. Normal wall thickness. Normal wall motion. There is normal systolic function with a visually estimated ejection fraction of 55 - 60%. There is normal diastolic function.    Right Ventricle: Mild right ventricular enlargement. Systolic function is mildly reduced.    Pulmonary Artery: The estimated pulmonary artery systolic pressure is 34 mmHg.    IVC/SVC: Intermediate venous pressure at 8 mmHg.    Aorta: Aortic root is mildly dilated measuring 4.4 cm. Ascending aorta is normal.      Hiv, hep c neg    Received 3d azithromycin and then cefepime or ceftriaxone since admit    Wbc 16k 84% gran    Procal 27    Lft downtrending    ID consulted for "Rhinovirus, secondary bacteria pneumonia      Interval history: still short winded with any exertion. Dry cough, but unable to produce sputum      History reviewed. No pertinent surgical history.    Review of patient's allergies indicates:  No Known Allergies    No current facility-administered medications on file prior to encounter.     Current Outpatient Medications on File Prior to Encounter   Medication Sig    acetaminophen (TYLENOL) 500 MG tablet Take 1,000 mg by mouth 2 (two) times daily as needed for Pain.    amLODIPine (NORVASC) 10 MG tablet Take 10 mg by mouth once daily.    atorvastatin (LIPITOR) 10 MG tablet Take 10 mg by mouth once daily.    fluticasone propionate (FLONASE) 50 mcg/actuation nasal spray 1 spray by Each Nostril route daily as needed for Allergies.    losartan (COZAAR) 100 MG tablet Take 100 mg by mouth every evening. "    testosterone cypionate (DEPOTESTOTERONE CYPIONATE) 200 mg/mL injection Inject 0.7 mLs into the muscle once a week.    triamterene-hydrochlorothiazide 75-50 mg (MAXZIDE) 75-50 mg per tablet Take 1 tablet by mouth every morning.     Family History    None       Tobacco Use    Smoking status: Never    Smokeless tobacco: Never   Substance and Sexual Activity    Alcohol use: Never    Drug use: Never    Sexual activity: Not on file     Review of Systems   Constitutional:  Positive for chills, fatigue and fever.   HENT: Negative.     Respiratory:  Positive for cough, shortness of breath and wheezing.    Cardiovascular:  Negative for chest pain and leg swelling.   Gastrointestinal:  Positive for diarrhea. Negative for abdominal distention, abdominal pain, nausea and vomiting.   Genitourinary: Negative.    Neurological: Negative.      Objective:     Vital Signs (Most Recent):  Temp: 98.5 °F (36.9 °C) (09/17/23 1624)  Pulse: 70 (09/17/23 1624)  Resp: 15 (09/17/23 1624)  BP: 133/70 (09/17/23 1624)  SpO2: 97 % (09/17/23 1624) Vital Signs (24h Range):  Temp:  [97.7 °F (36.5 °C)-98.5 °F (36.9 °C)] 98.5 °F (36.9 °C)  Pulse:  [66-89] 70  Resp:  [15-42] 15  SpO2:  [92 %-100 %] 97 %  BP: (117-173)/(64-96) 133/70     Weight: 128.8 kg (284 lb)  Body mass index is 41.94 kg/m².     Physical Exam  Constitutional:       Appearance: He is obese. He is ill-appearing.   HENT:      Mouth/Throat:      Mouth: Mucous membranes are moist.      Pharynx: Oropharynx is clear.   Eyes:      Extraocular Movements: Extraocular movements intact.   Cardiovascular:      Rate and Rhythm: Normal rate and regular rhythm.      Pulses: Normal pulses.      Heart sounds: No murmur heard.     No gallop.   Pulmonary:      Effort: Respiratory distress present.      Breath sounds: Wheezing present. No rales.   Abdominal:      General: There is distension.      Tenderness: There is no abdominal tenderness. There is no guarding.   Skin:     General: Skin is  warm.      Capillary Refill: Capillary refill takes less than 2 seconds.      Coloration: Skin is not jaundiced.      Findings: No bruising.   Neurological:      General: No focal deficit present.      Mental Status: He is oriented to person, place, and time.   Psychiatric:         Mood and Affect: Mood normal.                Significant Labs: All pertinent labs within the past 24 hours have been reviewed.    Significant Imaging: I have reviewed all pertinent imaging results/findings within the past 24 hours.  I have reviewed and interpreted all pertinent imaging results/findings within the past 24 hours.

## 2023-09-17 NOTE — PROGRESS NOTES
[facility] - Intensive Care ([place] [address])  [facility] Medicine  Progress Note    Patient Name: Umair Kenney  MRN: [id number]  Patient Class: IP- Inpatient   Admission Date: 9/10/2023  Length of Stay: 7 days  Attending Physician: Osmin Zambrano MD  Primary Care Provider: Stacia, Primary Doctor        Subjective:     Principal Problem:Severe sepsis        HPI:  63-year-old male with pmh of HTN and hx of PNA at age 17 and 55 that presents after returning from a [employer] cruise to [place] for 1 week. Prior to leaving [place] on the cruise, he starting to feel sick. He began having fever, chills, and ?bloody productive cough after leaving. The cruise doctor treated him for URI and bronchitis with zpac/cough suppressant. Throughout the trip, his symptoms progressively worsened and began having diarrhea (10 times daily) and shortness of breath. He was seen again by the doctor this AM and found to have PNA on CXR. Complaints of orthopnea, PND, and apnea at night. Denies any sick contacts or anyone else on cruise getting sick.     Patient's BP was in the 50s/40s on arrival per EMS. He was given a 1L of fluid and satting in the low 90s on 5L NC. Due to his acidemia and RR in the 30s on arrival, he was put on BIPAP and weaned to comfort flow satting 95-96%.     ED: afebrile, -110s, WBC 23, Na 128, K 3.2, bicarb 16, anion gap 20, Cr 3.5, AST//89  Procal 26, VBG - ph 7.2, CO2 49, HCO3 20.5, lactic 5.78, point of care lactic 1.85  CXR - R lung patchy opacities > L lung    Started on IV vanc/cefepime/azithromycin and admitted to medicine.            Overview/Hospital Course:  Patient admitted to MICU for increased work of breathing secondary to pneumonia. His legionella studies are pending. Initially treated with azithromycin, cefepime, and vancomycin and narrowed to ceftriaxone and azithromycin on 9/12/23. O2 requirements improving. Patient is not tolerating BiPAP overnight. Since admission, he has had worsening  LFTs with workup including liver ultrasound and hepatitis panel.      Interval History: On-going treatment.     Review of Systems   Constitutional:  Positive for fatigue. Negative for chills and fever.   Respiratory:  Positive for cough, shortness of breath and wheezing.    Cardiovascular:  Positive for chest pain. Negative for leg swelling.   Gastrointestinal:  Negative for abdominal pain, diarrhea, nausea and vomiting.   Genitourinary:  Negative for dysuria.   Musculoskeletal:  Negative for back pain.   Skin:  Negative for rash.   Neurological:  Negative for headaches.   Psychiatric/Behavioral:  Negative for confusion.      Objective:     Vital Signs (Most Recent):  Temp: 98.1 °F (36.7 °C) (09/17/23 1153)  Pulse: 89 (09/17/23 1400)  Resp: (!) 42 (09/17/23 1400)  BP: 117/64 (09/17/23 1153)  SpO2: 96 % (09/17/23 1400) Vital Signs (24h Range):  Temp:  [97.7 °F (36.5 °C)-98.3 °F (36.8 °C)] 98.1 °F (36.7 °C)  Pulse:  [66-89] 89  Resp:  [15-42] 42  SpO2:  [92 %-100 %] 96 %  BP: (117-173)/(64-96) 117/64     Weight: 128.8 kg (284 lb)  Body mass index is 41.94 kg/m².    Intake/Output Summary (Last 24 hours) at 9/17/2023 1503  Last data filed at 9/17/2023 0843  Gross per 24 hour   Intake 1406 ml   Output 850 ml   Net 556 ml         Physical Exam  Constitutional:       Appearance: He is ill-appearing.   HENT:      Head: Normocephalic and atraumatic.   Eyes:      Extraocular Movements: Extraocular movements intact.      Pupils: Pupils are equal, round, and reactive to light.   Cardiovascular:      Rate and Rhythm: Regular rhythm.   Pulmonary:      Comments: crackles  Abdominal:      General: There is no distension.      Palpations: Abdomen is soft.      Tenderness: There is no abdominal tenderness.   Musculoskeletal:      Cervical back: Normal range of motion and neck supple.   Neurological:      General: No focal deficit present.      Mental Status: He is oriented to person, place, and time.             Significant Labs: All  pertinent labs within the past 24 hours have been reviewed.  BMP:   Recent Labs   Lab 09/17/23  0417         K 5.0      CO2 22*   BUN 37*   CREATININE 1.1   CALCIUM 9.0   MG 1.6     CBC:   Recent Labs   Lab 09/16/23  0559 09/17/23  0417   WBC 16.40* 11.70   HGB 12.7* 12.7*   HCT 37.5* 39.3*   * 441       Significant Imaging: I have reviewed all pertinent imaging results/findings within the past 24 hours.      Assessment/Plan:      * Severe sepsis  This patient does have evidence of infective focus  My overall impression is sepsis.  Source: Respiratory  Antibiotics given-   Antibiotics (72h ago, onward)    Start     Stop Route Frequency Ordered    09/12/23 2100  cefTRIAXone (ROCEPHIN) 2 g in dextrose 5 % in water (D5W) 100 mL IVPB (MB+)         -- IV Every 24 hours (non-standard times) 09/12/23 1051    09/10/23 2230  mupirocin 2 % ointment         09/15/23 2059 Nasl 2 times daily 09/10/23 2123        Organ dysfunction indicated by Acute liver injury, Acute respiratory failure and transaminitis      Afebrile and satting on the 90s with RR in 30s on arrival  -WBC 23, RR >22, lactic 5.88 improved to 1.85 with fluids, procal 26.88  -Antibiotics have been de-escalated to ceftriaxone  -needed BIPAP and weaned to nasal cannula    COPD exacerbation  -Continue breo, nebulizers and steroids      Rhinovirus        Severe obesity (BMI >= 40)  Body mass index is 41.94 kg/m². Morbid obesity complicates all aspects of disease management from diagnostic modalities to treatment. Weight loss encouraged and health benefits explained to patient.         DANNI (acute kidney injury)  Patient with acute kidney injury/acute renal failure likely due to pre-renal azotemia due to dehydration DANNI is currently stable. Baseline creatinine unknown - Labs reviewed- Renal function/electrolytes with Estimated Creatinine Clearance: 92.5 mL/min (based on SCr of 1.1 mg/dL). according to latest data.    -monitor    Transaminitis  -Trending down  -Likely inflammatory response to viral infection  -US RUQ showing hepatomegaly with steatosis, possibly MEZA  -monitor    HTN (hypertension)  Continue home amlodipine and losartan for now  Holding Triamterene/HCTZ as pt is slightly volume depleted  Give a dose of Imdur for elevated BP   Resume diuretics as clinically indicated    Community acquired bacterial pneumonia  Rhinovirus with secondary bacterial pneumonia  CXR: RML and bilateral base consolidations  CT chest: several patchy foci of ground-glass/consolidation bilaterally, suggestive of evolving infectious or inflammatory process    -Continued to require 5L of O2  -Nebulizer treatment  -Antibiotics narrowed to ceftriaxone, started 09/12  -Continue steroids  -ID consulting: fungal crypto, histo, blasto and AFB ordered  -karius testing ordered        Acute respiratory failure with hypoxia and hypercarbia  Patient with Hypercapnic and Hypoxic Respiratory failure which is Acute.  he is not on home oxygen. Supplemental oxygen was provided and noted-      Signs/symptoms of respiratory failure include- increased work of breathing, respiratory distress and wheezing. Contributing diagnoses includes     -Weaned off BIPAP, currently on 5L NC  -Continue Prednisone  -Consulted pulmonary, no bronch at this time  -Cont to wean as tolerated      VTE Risk Mitigation (From admission, onward)         Ordered     IP VTE HIGH RISK PATIENT  Once         09/12/23 1958     Place sequential compression device  Until discontinued         09/12/23 1958     heparin (porcine) injection 7,500 Units  Every 8 hours         09/11/23 1644                Discharge Planning   CB: 9/18/2023     Code Status: Full Code   Is the patient medically ready for discharge?: No    Reason for patient still in hospital (select all that apply): Treatment  Discharge Plan A: Rehab   Discharge Delays: None known at this time              Osmin Zambrano,  MD  Department of Hospital Medicine   [facility] - [facility] ([place])

## 2023-09-17 NOTE — ASSESSMENT & PLAN NOTE
Patient with acute kidney injury/acute renal failure likely due to pre-renal azotemia due to dehydration DANNI is currently stable. Baseline creatinine unknown - Labs reviewed- Renal function/electrolytes with Estimated Creatinine Clearance: 92.5 mL/min (based on SCr of 1.1 mg/dL). according to latest data.   -monitor

## 2023-09-17 NOTE — ASSESSMENT & PLAN NOTE
This patient does have evidence of infective focus  My overall impression is sepsis.  Source: Respiratory  Antibiotics given-   Antibiotics (72h ago, onward)    Start     Stop Route Frequency Ordered    09/12/23 2100  cefTRIAXone (ROCEPHIN) 2 g in dextrose 5 % in water (D5W) 100 mL IVPB (MB+)         -- IV Every 24 hours (non-standard times) 09/12/23 1051    09/10/23 2230  mupirocin 2 % ointment         09/15/23 2059 Nasl 2 times daily 09/10/23 2123        Organ dysfunction indicated by Acute liver injury, Acute respiratory failure and transaminitis      Afebrile and satting on the 90s with RR in 30s on arrival  -WBC 23, RR >22, lactic 5.88 improved to 1.85 with fluids, procal 26.88  -Antibiotics have been de-escalated to ceftriaxone  -needed BIPAP and weaned to nasal cannula

## 2023-09-17 NOTE — SUBJECTIVE & OBJECTIVE
Past Medical History:   Diagnosis Date    Hypertension        History reviewed. No pertinent surgical history.    Review of patient's allergies indicates:  No Known Allergies    No current facility-administered medications on file prior to encounter.     Current Outpatient Medications on File Prior to Encounter   Medication Sig    acetaminophen (TYLENOL) 500 MG tablet Take 1,000 mg by mouth 2 (two) times daily as needed for Pain.    amLODIPine (NORVASC) 10 MG tablet Take 10 mg by mouth once daily.    atorvastatin (LIPITOR) 10 MG tablet Take 10 mg by mouth once daily.    fluticasone propionate (FLONASE) 50 mcg/actuation nasal spray 1 spray by Each Nostril route daily as needed for Allergies.    losartan (COZAAR) 100 MG tablet Take 100 mg by mouth every evening.    testosterone cypionate (DEPOTESTOTERONE CYPIONATE) 200 mg/mL injection Inject 0.7 mLs into the muscle once a week.    triamterene-hydrochlorothiazide 75-50 mg (MAXZIDE) 75-50 mg per tablet Take 1 tablet by mouth every morning.     Family History    None       Tobacco Use    Smoking status: Never    Smokeless tobacco: Never   Substance and Sexual Activity    Alcohol use: Never    Drug use: Never    Sexual activity: Not on file     Review of Systems   Constitutional:  Positive for chills, fatigue and fever.   HENT: Negative.     Respiratory:  Positive for cough, shortness of breath and wheezing.    Cardiovascular:  Negative for chest pain and leg swelling.   Gastrointestinal:  Positive for diarrhea. Negative for abdominal distention, abdominal pain, nausea and vomiting.   Genitourinary: Negative.    Neurological: Negative.      Objective:     Vital Signs (Most Recent):  Temp: 98.3 °F (36.8 °C) (09/16/23 1539)  Pulse: 79 (09/16/23 1605)  Resp: (!) 22 (09/16/23 1605)  BP: (!) 155/77 (09/16/23 1539)  SpO2: 97 % (09/16/23 1605) Vital Signs (24h Range):  Temp:  [97.7 °F (36.5 °C)-98.5 °F (36.9 °C)] 98.3 °F (36.8 °C)  Pulse:  [68-96] 79  Resp:  [18-97] 22  SpO2:   [94 %-99 %] 97 %  BP: (143-168)/(77-87) 155/77     Weight: 128.8 kg (284 lb)  Body mass index is 41.94 kg/m².     Physical Exam  Constitutional:       Appearance: He is obese. He is ill-appearing.   HENT:      Mouth/Throat:      Mouth: Mucous membranes are moist.      Pharynx: Oropharynx is clear.   Eyes:      Extraocular Movements: Extraocular movements intact.   Cardiovascular:      Rate and Rhythm: Normal rate and regular rhythm.      Pulses: Normal pulses.      Heart sounds: No murmur heard.     No gallop.   Pulmonary:      Effort: Respiratory distress present.      Breath sounds: Wheezing present. No rales.   Abdominal:      General: There is distension.      Tenderness: There is no abdominal tenderness. There is no guarding.   Skin:     General: Skin is warm.      Capillary Refill: Capillary refill takes less than 2 seconds.      Coloration: Skin is not jaundiced.      Findings: No bruising.   Neurological:      General: No focal deficit present.      Mental Status: He is oriented to person, place, and time.   Psychiatric:         Mood and Affect: Mood normal.                Significant Labs: All pertinent labs within the past 24 hours have been reviewed.    Significant Imaging: I have reviewed all pertinent imaging results/findings within the past 24 hours.  I have reviewed and interpreted all pertinent imaging results/findings within the past 24 hours.

## 2023-09-17 NOTE — ASSESSMENT & PLAN NOTE
Continue home amlodipine and losartan for now  Holding Triamterene/HCTZ as pt is slightly volume depleted  Give a dose of Imdur for elevated BP   Resume diuretics as clinically indicated

## 2023-09-17 NOTE — SUBJECTIVE & OBJECTIVE
Interval History: On-going treatment.     Review of Systems   Constitutional:  Positive for fatigue. Negative for chills and fever.   Respiratory:  Positive for cough, shortness of breath and wheezing.    Cardiovascular:  Positive for chest pain. Negative for leg swelling.   Gastrointestinal:  Negative for abdominal pain, diarrhea, nausea and vomiting.   Genitourinary:  Negative for dysuria.   Musculoskeletal:  Negative for back pain.   Skin:  Negative for rash.   Neurological:  Negative for headaches.   Psychiatric/Behavioral:  Negative for confusion.      Objective:     Vital Signs (Most Recent):  Temp: 98.1 °F (36.7 °C) (09/17/23 1153)  Pulse: 89 (09/17/23 1400)  Resp: (!) 42 (09/17/23 1400)  BP: 117/64 (09/17/23 1153)  SpO2: 96 % (09/17/23 1400) Vital Signs (24h Range):  Temp:  [97.7 °F (36.5 °C)-98.3 °F (36.8 °C)] 98.1 °F (36.7 °C)  Pulse:  [66-89] 89  Resp:  [15-42] 42  SpO2:  [92 %-100 %] 96 %  BP: (117-173)/(64-96) 117/64     Weight: 128.8 kg (284 lb)  Body mass index is 41.94 kg/m².    Intake/Output Summary (Last 24 hours) at 9/17/2023 1503  Last data filed at 9/17/2023 0843  Gross per 24 hour   Intake 1406 ml   Output 850 ml   Net 556 ml         Physical Exam  Constitutional:       Appearance: He is ill-appearing.   HENT:      Head: Normocephalic and atraumatic.   Eyes:      Extraocular Movements: Extraocular movements intact.      Pupils: Pupils are equal, round, and reactive to light.   Cardiovascular:      Rate and Rhythm: Regular rhythm.   Pulmonary:      Comments: crackles  Abdominal:      General: There is no distension.      Palpations: Abdomen is soft.      Tenderness: There is no abdominal tenderness.   Musculoskeletal:      Cervical back: Normal range of motion and neck supple.   Neurological:      General: No focal deficit present.      Mental Status: He is oriented to person, place, and time.             Significant Labs: All pertinent labs within the past 24 hours have been reviewed.  BMP:    Recent Labs   Lab 09/17/23  0417         K 5.0      CO2 22*   BUN 37*   CREATININE 1.1   CALCIUM 9.0   MG 1.6     CBC:   Recent Labs   Lab 09/16/23  0559 09/17/23  0417   WBC 16.40* 11.70   HGB 12.7* 12.7*   HCT 37.5* 39.3*   * 441       Significant Imaging: I have reviewed all pertinent imaging results/findings within the past 24 hours.

## 2023-09-17 NOTE — SUBJECTIVE & OBJECTIVE
Interval history: still short winded with any exertion. Dry cough, but unable to produce sputum      History reviewed. No pertinent surgical history.    Review of patient's allergies indicates:  No Known Allergies    No current facility-administered medications on file prior to encounter.     Current Outpatient Medications on File Prior to Encounter   Medication Sig    acetaminophen (TYLENOL) 500 MG tablet Take 1,000 mg by mouth 2 (two) times daily as needed for Pain.    amLODIPine (NORVASC) 10 MG tablet Take 10 mg by mouth once daily.    atorvastatin (LIPITOR) 10 MG tablet Take 10 mg by mouth once daily.    fluticasone propionate (FLONASE) 50 mcg/actuation nasal spray 1 spray by Each Nostril route daily as needed for Allergies.    losartan (COZAAR) 100 MG tablet Take 100 mg by mouth every evening.    testosterone cypionate (DEPOTESTOTERONE CYPIONATE) 200 mg/mL injection Inject 0.7 mLs into the muscle once a week.    triamterene-hydrochlorothiazide 75-50 mg (MAXZIDE) 75-50 mg per tablet Take 1 tablet by mouth every morning.     Family History    None       Tobacco Use    Smoking status: Never    Smokeless tobacco: Never   Substance and Sexual Activity    Alcohol use: Never    Drug use: Never    Sexual activity: Not on file     Review of Systems   Constitutional:  Positive for chills, fatigue and fever.   HENT: Negative.     Respiratory:  Positive for cough, shortness of breath and wheezing.    Cardiovascular:  Negative for chest pain and leg swelling.   Gastrointestinal:  Positive for diarrhea. Negative for abdominal distention, abdominal pain, nausea and vomiting.   Genitourinary: Negative.    Neurological: Negative.      Objective:     Vital Signs (Most Recent):  Temp: 98.5 °F (36.9 °C) (09/17/23 1624)  Pulse: 70 (09/17/23 1624)  Resp: 15 (09/17/23 1624)  BP: 133/70 (09/17/23 1624)  SpO2: 97 % (09/17/23 1624) Vital Signs (24h Range):  Temp:  [97.7 °F (36.5 °C)-98.5 °F (36.9 °C)] 98.5 °F (36.9 °C)  Pulse:  [66-89]  70  Resp:  [15-42] 15  SpO2:  [92 %-100 %] 97 %  BP: (117-173)/(64-96) 133/70     Weight: 128.8 kg (284 lb)  Body mass index is 41.94 kg/m².     Physical Exam  Constitutional:       Appearance: He is obese. He is ill-appearing.   HENT:      Mouth/Throat:      Mouth: Mucous membranes are moist.      Pharynx: Oropharynx is clear.   Eyes:      Extraocular Movements: Extraocular movements intact.   Cardiovascular:      Rate and Rhythm: Normal rate and regular rhythm.      Pulses: Normal pulses.      Heart sounds: No murmur heard.     No gallop.   Pulmonary:      Effort: Respiratory distress present.      Breath sounds: Wheezing present. No rales.   Abdominal:      General: There is distension.      Tenderness: There is no abdominal tenderness. There is no guarding.   Skin:     General: Skin is warm.      Capillary Refill: Capillary refill takes less than 2 seconds.      Coloration: Skin is not jaundiced.      Findings: No bruising.   Neurological:      General: No focal deficit present.      Mental Status: He is oriented to person, place, and time.   Psychiatric:         Mood and Affect: Mood normal.                Significant Labs: All pertinent labs within the past 24 hours have been reviewed.    Significant Imaging: I have reviewed all pertinent imaging results/findings within the past 24 hours.  I have reviewed and interpreted all pertinent imaging results/findings within the past 24 hours.

## 2023-09-17 NOTE — ASSESSMENT & PLAN NOTE
Patient with Hypercapnic and Hypoxic Respiratory failure which is Acute.  he is not on home oxygen. Supplemental oxygen was provided and noted-      Signs/symptoms of respiratory failure include- increased work of breathing, respiratory distress and wheezing. Contributing diagnoses includes     -Weaned off BIPAP, currently on 5L NC  -Continue Prednisone  -Consulted pulmonary, no bronch at this time  -Cont to wean as tolerated

## 2023-09-18 LAB
ALBUMIN SERPL BCP-MCNC: 2.4 G/DL (ref 3.5–5.2)
ALP SERPL-CCNC: 116 U/L (ref 55–135)
ALT SERPL W/O P-5'-P-CCNC: 178 U/L (ref 10–44)
ANION GAP SERPL CALC-SCNC: 5 MMOL/L (ref 8–16)
ANISOCYTOSIS BLD QL SMEAR: SLIGHT
AST SERPL-CCNC: 81 U/L (ref 10–40)
BASOPHILS NFR BLD: 1 % (ref 0–1.9)
BILIRUB SERPL-MCNC: 0.5 MG/DL (ref 0.1–1)
BUN SERPL-MCNC: 31 MG/DL (ref 8–23)
CALCIUM SERPL-MCNC: 9 MG/DL (ref 8.7–10.5)
CHLORIDE SERPL-SCNC: 106 MMOL/L (ref 95–110)
CO2 SERPL-SCNC: 27 MMOL/L (ref 23–29)
CREAT SERPL-MCNC: 1.1 MG/DL (ref 0.5–1.4)
DIFFERENTIAL METHOD: ABNORMAL
EOSINOPHIL NFR BLD: 0 % (ref 0–8)
ERYTHROCYTE [DISTWIDTH] IN BLOOD BY AUTOMATED COUNT: 14.9 % (ref 11.5–14.5)
EST. GFR  (NO RACE VARIABLE): >60 ML/MIN/1.73 M^2
GLUCOSE SERPL-MCNC: 89 MG/DL (ref 70–110)
HCT VFR BLD AUTO: 39.2 % (ref 40–54)
HGB BLD-MCNC: 12.1 G/DL (ref 14–18)
IMM GRANULOCYTES # BLD AUTO: ABNORMAL K/UL (ref 0–0.04)
IMM GRANULOCYTES NFR BLD AUTO: ABNORMAL % (ref 0–0.5)
LYMPHOCYTES NFR BLD: 30 % (ref 18–48)
MAGNESIUM SERPL-MCNC: 1.6 MG/DL (ref 1.6–2.6)
MCH RBC QN AUTO: 30.9 PG (ref 27–31)
MCHC RBC AUTO-ENTMCNC: 30.9 G/DL (ref 32–36)
MCV RBC AUTO: 100 FL (ref 82–98)
METAMYELOCYTES NFR BLD MANUAL: 1 %
MONOCYTES NFR BLD: 4 % (ref 4–15)
MYELOCYTES NFR BLD MANUAL: 2 %
NEUTROPHILS NFR BLD: 59 % (ref 38–73)
NEUTS BAND NFR BLD MANUAL: 3 %
NRBC BLD-RTO: 0 /100 WBC
OVALOCYTES BLD QL SMEAR: ABNORMAL
PHOSPHATE SERPL-MCNC: 3 MG/DL (ref 2.7–4.5)
PLATELET # BLD AUTO: 444 K/UL (ref 150–450)
PMV BLD AUTO: 10.1 FL (ref 9.2–12.9)
POCT GLUCOSE: 136 MG/DL (ref 70–110)
POCT GLUCOSE: 150 MG/DL (ref 70–110)
POCT GLUCOSE: 78 MG/DL (ref 70–110)
POIKILOCYTOSIS BLD QL SMEAR: SLIGHT
POLYCHROMASIA BLD QL SMEAR: ABNORMAL
POTASSIUM SERPL-SCNC: 4.9 MMOL/L (ref 3.5–5.1)
PROT SERPL-MCNC: 7.2 G/DL (ref 6–8.4)
RBC # BLD AUTO: 3.91 M/UL (ref 4.6–6.2)
SMUDGE CELLS BLD QL SMEAR: PRESENT
SODIUM SERPL-SCNC: 138 MMOL/L (ref 136–145)
WBC # BLD AUTO: 10.23 K/UL (ref 3.9–12.7)

## 2023-09-18 PROCEDURE — 27000207 HC ISOLATION

## 2023-09-18 PROCEDURE — 63600175 PHARM REV CODE 636 W HCPCS

## 2023-09-18 PROCEDURE — 25000003 PHARM REV CODE 250

## 2023-09-18 PROCEDURE — 99232 PR SUBSEQUENT HOSPITAL CARE,LEVL II: ICD-10-PCS | Mod: ,,, | Performed by: INTERNAL MEDICINE

## 2023-09-18 PROCEDURE — 99232 SBSQ HOSP IP/OBS MODERATE 35: CPT | Mod: ,,, | Performed by: INTERNAL MEDICINE

## 2023-09-18 PROCEDURE — 83735 ASSAY OF MAGNESIUM: CPT

## 2023-09-18 PROCEDURE — 94640 AIRWAY INHALATION TREATMENT: CPT

## 2023-09-18 PROCEDURE — 36415 COLL VENOUS BLD VENIPUNCTURE: CPT

## 2023-09-18 PROCEDURE — 20600001 HC STEP DOWN PRIVATE ROOM

## 2023-09-18 PROCEDURE — 27000221 HC OXYGEN, UP TO 24 HOURS

## 2023-09-18 PROCEDURE — 25000242 PHARM REV CODE 250 ALT 637 W/ HCPCS

## 2023-09-18 PROCEDURE — 85007 BL SMEAR W/DIFF WBC COUNT: CPT

## 2023-09-18 PROCEDURE — 84100 ASSAY OF PHOSPHORUS: CPT

## 2023-09-18 PROCEDURE — 99900035 HC TECH TIME PER 15 MIN (STAT)

## 2023-09-18 PROCEDURE — 63600175 PHARM REV CODE 636 W HCPCS: Performed by: INTERNAL MEDICINE

## 2023-09-18 PROCEDURE — 80053 COMPREHEN METABOLIC PANEL: CPT

## 2023-09-18 PROCEDURE — 25000003 PHARM REV CODE 250: Performed by: INTERNAL MEDICINE

## 2023-09-18 PROCEDURE — 94761 N-INVAS EAR/PLS OXIMETRY MLT: CPT

## 2023-09-18 PROCEDURE — 85027 COMPLETE CBC AUTOMATED: CPT

## 2023-09-18 RX ADMIN — BENZONATATE 200 MG: 100 CAPSULE ORAL at 03:09

## 2023-09-18 RX ADMIN — IPRATROPIUM BROMIDE 0.5 MG: 0.5 SOLUTION RESPIRATORY (INHALATION) at 08:09

## 2023-09-18 RX ADMIN — LEVALBUTEROL 1.25 MG: 1.25 SOLUTION, CONCENTRATE RESPIRATORY (INHALATION) at 01:09

## 2023-09-18 RX ADMIN — FLUTICASONE FUROATE AND VILANTEROL TRIFENATATE 1 PUFF: 100; 25 POWDER RESPIRATORY (INHALATION) at 08:09

## 2023-09-18 RX ADMIN — HEPARIN SODIUM 7500 UNITS: 5000 INJECTION INTRAVENOUS; SUBCUTANEOUS at 03:09

## 2023-09-18 RX ADMIN — LEVALBUTEROL 1.25 MG: 1.25 SOLUTION, CONCENTRATE RESPIRATORY (INHALATION) at 04:09

## 2023-09-18 RX ADMIN — BENZONATATE 200 MG: 100 CAPSULE ORAL at 09:09

## 2023-09-18 RX ADMIN — IPRATROPIUM BROMIDE 0.5 MG: 0.5 SOLUTION RESPIRATORY (INHALATION) at 12:09

## 2023-09-18 RX ADMIN — Medication 6 MG: at 09:09

## 2023-09-18 RX ADMIN — HEPARIN SODIUM 7500 UNITS: 5000 INJECTION INTRAVENOUS; SUBCUTANEOUS at 05:09

## 2023-09-18 RX ADMIN — LEVALBUTEROL 1.25 MG: 1.25 SOLUTION, CONCENTRATE RESPIRATORY (INHALATION) at 08:09

## 2023-09-18 RX ADMIN — IPRATROPIUM BROMIDE 0.5 MG: 0.5 SOLUTION RESPIRATORY (INHALATION) at 05:09

## 2023-09-18 RX ADMIN — HEPARIN SODIUM 7500 UNITS: 5000 INJECTION INTRAVENOUS; SUBCUTANEOUS at 09:09

## 2023-09-18 RX ADMIN — IPRATROPIUM BROMIDE 0.5 MG: 0.5 SOLUTION RESPIRATORY (INHALATION) at 01:09

## 2023-09-18 RX ADMIN — LOSARTAN POTASSIUM 100 MG: 50 TABLET, FILM COATED ORAL at 09:09

## 2023-09-18 RX ADMIN — ATORVASTATIN CALCIUM 10 MG: 10 TABLET, FILM COATED ORAL at 09:09

## 2023-09-18 RX ADMIN — LEVALBUTEROL 1.25 MG: 1.25 SOLUTION, CONCENTRATE RESPIRATORY (INHALATION) at 05:09

## 2023-09-18 RX ADMIN — CEFTRIAXONE 2 G: 2 INJECTION, POWDER, FOR SOLUTION INTRAMUSCULAR; INTRAVENOUS at 09:09

## 2023-09-18 RX ADMIN — AMLODIPINE BESYLATE 10 MG: 10 TABLET ORAL at 09:09

## 2023-09-18 RX ADMIN — IPRATROPIUM BROMIDE 0.5 MG: 0.5 SOLUTION RESPIRATORY (INHALATION) at 04:09

## 2023-09-18 RX ADMIN — PREDNISONE 40 MG: 20 TABLET ORAL at 09:09

## 2023-09-18 RX ADMIN — LEVALBUTEROL 1.25 MG: 1.25 SOLUTION, CONCENTRATE RESPIRATORY (INHALATION) at 12:09

## 2023-09-18 NOTE — PT/OT/SLP PROGRESS
Occupational Therapy      Patient Name:  Umair Kenney   MRN:  [id number]    4:50 PM Patient not seen today secondary to Patient fatigue. Will follow-up as able.    9/17/2023

## 2023-09-18 NOTE — PLAN OF CARE
Pt A&Ox4. Denies chest pain or SOB. VSS, afebrile, 5L O2, NSR. Up ad liset. Adequate output. No changes overnight, pt rested comfortably, continuing POC.

## 2023-09-18 NOTE — PROGRESS NOTES
"[facility] ([place] [address])  Infectious Disease  Progress Note    Patient Name: Umair Kenney  MRN: [id number]  Admission Date: 9/10/2023  Length of Stay: 8 days  Attending Physician: Osmin Zambrano MD  Primary Care Provider: Stacia, Primary Doctor    Isolation Status: Droplet  Assessment/Plan:      Pulmonary  Pneumonia due to infectious organism  63M with h/o htn and obesity (BMI 42) admitted 9/12 with acute on chronic respiratory failure; baseline severe MOTLEY and orthopnea. Traveling from [place] to go on a cruise and symptoms started prior to getting to [place] or going on cruise. CT- ground glass consolidation b/l. procal elevated. resp culture- normal luther, rare wbc. 2d echo with PAP 34. Hiv, hep c neg. Received 3 days azithromycin, 3 days Cefepime prior to deescalation to Ceftriaxone since admit. WBC 16k, 84% gran. LFT downtrending. ID consulted for "Rhinovirus, secondary bacteria pneumonia    Patient with known history of chronic respiratory failure. Respiratory Infection Panel with positive rhinovirus. Suspect hyper-inflammatory response to rhinovirus vs secondary process such as superimposed bacterial infection contributing to severity of illness. Though unlikely, mycobacterial infection in differential based upon imaging studies however patient has been unable to produce sputum for AFB smear/culture    Recommendations:   - OK to discontinue Ceftriaxone at this time  - Will follow up pending fungal studies -  histo, blasto pending (crypto negative)  - Can consider AFB smear/culture if patient able to produce sputum  - Karius testing pending, will follow up results  - Agree with continuing trial of steroids        Anticipated Disposition: TBD    Thank you for your consult. I will follow-up with patient. Please contact us if you have any additional questions.    Sonam Urena DO  Infectious Disease  [facility] ([place] [address])    Subjective:     Principal Problem:Severe " sepsis    HPI:   63M with h/o htn and obesity (BMI 42) admitted 9/12 with shortness of breath. Says he can hardly make it to bathroom without sob. Reports traveling to [place] 9/1 to leave on a cruise, but already wasn't feeling well. Reports breathing got worse over the coming days associated with runny nose, congestion, chills. Then went on a 7d [facility] [place] [facility] and only got off the boat once because he was feeling so badly. Was given z-pack, no relief. Reports wife is also sick with runny nose/congestion, but doesn't have resp issues. Reports being sick 7 years ago and at 16yo with similar symptoms. Reports having a bronchoscopy before (and thinks it made him feel better), but denies prior dx of resp issues. Says that he previously worked as a [employer], but son has now taken over business. Says he used to try to stay away from dust, but not always posisble. Has a dog. Denies tobacco. From [place], [place]. Denies abx allergies. Denies indwelling hardware. prior to feeling sick this time, he reports his baseline includes unable to lay flat without sob (sleeps sitting up) and gets short winded with exertion.     Ct chest  Interval improved aeration of the right lower lobe noting residual patchy ground-glass and consolidation remains.  There is a small right pleural effusion, similar to the previous exam.    There are several additional patchy foci of ground-glass/consolidation bilaterally, some of which appears stable, some of which have increased since the previous exam.  Findings suggest evolving infectious or inflammatory process, continued follow-up is advised to exclude underlying malignancy.  Scattered tree-in-bud type nodules suggest infectious or inflammatory process.    Respiratory Culture Normal respiratory luther    Respiratory Culture No S aureus or Pseudomonas isolated.    Gram Stain (Respiratory) <10 epithelial cells per low power field.    Gram Stain (Respiratory) Rare  "WBC's        Human Rhinovirus/Enterovirus Not Detected Detected Abnormal         Echo-    Left Ventricle: The left ventricle is normal in size. Normal wall thickness. Normal wall motion. There is normal systolic function with a visually estimated ejection fraction of 55 - 60%. There is normal diastolic function.    Right Ventricle: Mild right ventricular enlargement. Systolic function is mildly reduced.    Pulmonary Artery: The estimated pulmonary artery systolic pressure is 34 mmHg.    IVC/SVC: Intermediate venous pressure at 8 mmHg.    Aorta: Aortic root is mildly dilated measuring 4.4 cm. Ascending aorta is normal.      Hiv, hep c neg    Received 3d azithromycin and then cefepime or ceftriaxone since admit    Wbc 16k 84% gran    Procal 27    Lft downtrending    ID consulted for "Rhinovirus, secondary bacteria pneumonia    Interval History: MARIO. Patient remains afebrile and hemodynamically stable. Continues on 5L HFNC. Labs stable, no leukocytosis. Continues on CTX. Histo, Blasto, Mycoplasma, Strep pneumo testing pending.     Review of Systems   Constitutional:  Positive for appetite change and chills. Negative for fatigue and fever.   Respiratory:  Positive for cough, shortness of breath and wheezing. Negative for chest tightness.    Cardiovascular:  Negative for chest pain and leg swelling.   Gastrointestinal:  Negative for abdominal pain, diarrhea, nausea and vomiting.   Genitourinary:  Negative for dysuria, flank pain and hematuria.   Musculoskeletal:  Negative for arthralgias and myalgias.   Skin:  Negative for pallor and rash.   Neurological:  Negative for dizziness, weakness and headaches.   Psychiatric/Behavioral:  Negative for dysphoric mood. The patient is not nervous/anxious.      Objective:     Vital Signs (Most Recent):  Temp: 98.2 °F (36.8 °C) (09/18/23 0846)  Pulse: 69 (09/18/23 0852)  Resp: (!) 23 (09/18/23 0852)  BP: (!) 158/83 (09/18/23 0846)  SpO2: 96 % (09/18/23 0852) Vital Signs (24h " Range):  Temp:  [97.7 °F (36.5 °C)-98.5 °F (36.9 °C)] 98.2 °F (36.8 °C)  Pulse:  [63-89] 69  Resp:  [15-42] 23  SpO2:  [92 %-98 %] 96 %  BP: (117-158)/(64-83) 158/83     Weight: 128.8 kg (284 lb)  Body mass index is 41.94 kg/m².    Estimated Creatinine Clearance: 92.5 mL/min (based on SCr of 1.1 mg/dL).     Physical Exam  Vitals and nursing note reviewed.   Constitutional:       General: He is awake. He is not in acute distress.     Appearance: Normal appearance. He is morbidly obese. He is ill-appearing. He is not toxic-appearing or diaphoretic.   HENT:      Head: Normocephalic and atraumatic.      Nose: Nose normal.      Mouth/Throat:      Mouth: Mucous membranes are moist.   Eyes:      General: No scleral icterus.     Extraocular Movements: Extraocular movements intact.      Pupils: Pupils are equal, round, and reactive to light.   Cardiovascular:      Rate and Rhythm: Normal rate and regular rhythm.      Pulses: Normal pulses.      Heart sounds: No murmur heard.  Pulmonary:      Effort: Pulmonary effort is normal. No respiratory distress.      Breath sounds: Rales present. No wheezing.      Comments: Currently on 5L HFNC  Abdominal:      General: Bowel sounds are normal. There is no distension.      Palpations: Abdomen is soft.      Tenderness: There is no abdominal tenderness.   Musculoskeletal:      Cervical back: Normal range of motion.      Right lower leg: No edema.      Left lower leg: No edema.   Skin:     General: Skin is warm and dry.      Capillary Refill: Capillary refill takes less than 2 seconds.      Findings: No erythema or rash.   Neurological:      General: No focal deficit present.      Mental Status: He is alert and oriented to person, place, and time.   Psychiatric:         Mood and Affect: Mood normal.         Behavior: Behavior normal. Behavior is cooperative.         Thought Content: Thought content normal.          Significant Labs: All pertinent labs within the past 24 hours have been  reviewed.    Significant Imaging: I have reviewed all pertinent imaging results/findings within the past 24 hours.

## 2023-09-18 NOTE — SUBJECTIVE & OBJECTIVE
Interval History: MARIO. Patient remains afebrile and hemodynamically stable. Continues on 5L HFNC. Labs stable, no leukocytosis. Continues on CTX. Histo, Blasto, Mycoplasma, Strep pneumo testing pending.     Review of Systems   Constitutional:  Positive for appetite change and chills. Negative for fatigue and fever.   Respiratory:  Positive for cough, shortness of breath and wheezing. Negative for chest tightness.    Cardiovascular:  Negative for chest pain and leg swelling.   Gastrointestinal:  Negative for abdominal pain, diarrhea, nausea and vomiting.   Genitourinary:  Negative for dysuria, flank pain and hematuria.   Musculoskeletal:  Negative for arthralgias and myalgias.   Skin:  Negative for pallor and rash.   Neurological:  Negative for dizziness, weakness and headaches.   Psychiatric/Behavioral:  Negative for dysphoric mood. The patient is not nervous/anxious.      Objective:     Vital Signs (Most Recent):  Temp: 98.2 °F (36.8 °C) (09/18/23 0846)  Pulse: 69 (09/18/23 0852)  Resp: (!) 23 (09/18/23 0852)  BP: (!) 158/83 (09/18/23 0846)  SpO2: 96 % (09/18/23 0852) Vital Signs (24h Range):  Temp:  [97.7 °F (36.5 °C)-98.5 °F (36.9 °C)] 98.2 °F (36.8 °C)  Pulse:  [63-89] 69  Resp:  [15-42] 23  SpO2:  [92 %-98 %] 96 %  BP: (117-158)/(64-83) 158/83     Weight: 128.8 kg (284 lb)  Body mass index is 41.94 kg/m².    Estimated Creatinine Clearance: 92.5 mL/min (based on SCr of 1.1 mg/dL).     Physical Exam  Vitals and nursing note reviewed.   Constitutional:       General: He is awake. He is not in acute distress.     Appearance: Normal appearance. He is morbidly obese. He is ill-appearing. He is not toxic-appearing or diaphoretic.   HENT:      Head: Normocephalic and atraumatic.      Nose: Nose normal.      Mouth/Throat:      Mouth: Mucous membranes are moist.   Eyes:      General: No scleral icterus.     Extraocular Movements: Extraocular movements intact.      Pupils: Pupils are equal, round, and reactive to light.    Cardiovascular:      Rate and Rhythm: Normal rate and regular rhythm.      Pulses: Normal pulses.      Heart sounds: No murmur heard.  Pulmonary:      Effort: Pulmonary effort is normal. No respiratory distress.      Breath sounds: Rales present. No wheezing.      Comments: Currently on 5L HFNC  Abdominal:      General: Bowel sounds are normal. There is no distension.      Palpations: Abdomen is soft.      Tenderness: There is no abdominal tenderness.   Musculoskeletal:      Cervical back: Normal range of motion.      Right lower leg: No edema.      Left lower leg: No edema.   Skin:     General: Skin is warm and dry.      Capillary Refill: Capillary refill takes less than 2 seconds.      Findings: No erythema or rash.   Neurological:      General: No focal deficit present.      Mental Status: He is alert and oriented to person, place, and time.   Psychiatric:         Mood and Affect: Mood normal.         Behavior: Behavior normal. Behavior is cooperative.         Thought Content: Thought content normal.          Significant Labs: All pertinent labs within the past 24 hours have been reviewed.    Significant Imaging: I have reviewed all pertinent imaging results/findings within the past 24 hours.

## 2023-09-18 NOTE — PROGRESS NOTES
[facility] ([place] [address])  [facility] Medicine  Progress Note    Patient Name: Umair Kenney  MRN: [id number]  Patient Class: IP- Inpatient   Admission Date: 9/10/2023  Length of Stay: 8 days  Attending Physician: Osmin Zambrano MD  Primary Care Provider: Stacia, Primary Doctor        Subjective:     Principal Problem:Severe sepsis        HPI:  63-year-old male with pmh of HTN and hx of PNA at age 17 and 55 that presents after returning from a [employer] cruise to [place] for 1 week. Prior to leaving [place] on the cruise, he starting to feel sick. He began having fever, chills, and ?bloody productive cough after leaving. The cruise doctor treated him for URI and bronchitis with zpac/cough suppressant. Throughout the trip, his symptoms progressively worsened and began having diarrhea (10 times daily) and shortness of breath. He was seen again by the doctor this AM and found to have PNA on CXR. Complaints of orthopnea, PND, and apnea at night. Denies any sick contacts or anyone else on cruise getting sick.     Patient's BP was in the 50s/40s on arrival per EMS. He was given a 1L of fluid and satting in the low 90s on 5L NC. Due to his acidemia and RR in the 30s on arrival, he was put on BIPAP and weaned to comfort flow satting 95-96%.     ED: afebrile, -110s, WBC 23, Na 128, K 3.2, bicarb 16, anion gap 20, Cr 3.5, AST//89  Procal 26, VBG - ph 7.2, CO2 49, HCO3 20.5, lactic 5.78, point of care lactic 1.85  CXR - R lung patchy opacities > L lung    Started on IV vanc/cefepime/azithromycin and admitted to medicine.            Overview/Hospital Course:  Patient admitted to MICU for increased work of breathing secondary to pneumonia. His legionella studies are pending. Initially treated with azithromycin, cefepime, and vancomycin and narrowed to ceftriaxone and azithromycin on 9/12/23. O2 requirements improving. Patient is not tolerating BiPAP overnight. Since admission, he has had worsening  LFTs with workup including liver ultrasound and hepatitis panel.      Interval History: Weaned down to 3L O2 with sp02 96-97%.    Review of Systems   Constitutional:  Positive for fatigue. Negative for chills and fever.   Respiratory:  Positive for cough, shortness of breath and wheezing.    Cardiovascular:  Positive for chest pain. Negative for leg swelling.   Gastrointestinal:  Negative for abdominal pain, diarrhea, nausea and vomiting.   Genitourinary:  Negative for dysuria.   Musculoskeletal:  Negative for back pain.   Skin:  Negative for rash.   Neurological:  Negative for headaches.   Psychiatric/Behavioral:  Negative for confusion.      Objective:     Vital Signs (Most Recent):  Temp: 98 °F (36.7 °C) (09/18/23 1301)  Pulse: 81 (09/18/23 1514)  Resp: (!) 24 (09/18/23 1301)  BP: (!) 146/79 (09/18/23 1301)  SpO2: 95 % (09/18/23 1514) Vital Signs (24h Range):  Temp:  [97.7 °F (36.5 °C)-98.5 °F (36.9 °C)] 98 °F (36.7 °C)  Pulse:  [63-81] 81  Resp:  [15-29] 24  SpO2:  [93 %-98 %] 95 %  BP: (125-158)/(65-83) 146/79     Weight: 128.8 kg (284 lb)  Body mass index is 41.94 kg/m².    Intake/Output Summary (Last 24 hours) at 9/18/2023 1552  Last data filed at 9/18/2023 1542  Gross per 24 hour   Intake 631 ml   Output 1900 ml   Net -1269 ml         Physical Exam  Constitutional:       Appearance: He is ill-appearing.   HENT:      Head: Normocephalic and atraumatic.   Eyes:      Extraocular Movements: Extraocular movements intact.      Pupils: Pupils are equal, round, and reactive to light.   Cardiovascular:      Rate and Rhythm: Regular rhythm.   Pulmonary:      Comments: crackles  Abdominal:      General: There is no distension.      Palpations: Abdomen is soft.      Tenderness: There is no abdominal tenderness.   Musculoskeletal:      Cervical back: Normal range of motion and neck supple.   Neurological:      General: No focal deficit present.      Mental Status: He is oriented to person, place, and time.              Significant Labs: All pertinent labs within the past 24 hours have been reviewed.  BMP:   Recent Labs   Lab 09/18/23  0542   GLU 89      K 4.9      CO2 27   BUN 31*   CREATININE 1.1   CALCIUM 9.0   MG 1.6     CBC:   Recent Labs   Lab 09/17/23  0417 09/18/23  0542   WBC 11.70 10.23   HGB 12.7* 12.1*   HCT 39.3* 39.2*    444       Significant Imaging: I have reviewed all pertinent imaging results/findings within the past 24 hours.      Assessment/Plan:      * Severe sepsis  This patient does have evidence of infective focus  My overall impression is sepsis.  Source: Respiratory  Antibiotics given-   Antibiotics (72h ago, onward)    Start     Stop Route Frequency Ordered    09/12/23 2100  cefTRIAXone (ROCEPHIN) 2 g in dextrose 5 % in water (D5W) 100 mL IVPB (MB+)         -- IV Every 24 hours (non-standard times) 09/12/23 1051        Organ dysfunction indicated by Acute liver injury, Acute respiratory failure and transaminitis      Afebrile and satting on the 90s with RR in 30s on arrival  -WBC 23, RR >22, lactic 5.88 improved to 1.85 with fluids, procal 26.88  -Complete 7 days ceftriaxone (09/18)  -weaned to nasal cannula    Pneumonia due to infectious organism        COPD exacerbation  -Continue breo, nebulizers and steroids      Rhinovirus        Severe obesity (BMI >= 40)  Body mass index is 41.94 kg/m². Morbid obesity complicates all aspects of disease management from diagnostic modalities to treatment. Weight loss encouraged and health benefits explained to patient.         DANNI (acute kidney injury)  Patient with acute kidney injury/acute renal failure likely due to pre-renal azotemia due to dehydration DANNI is currently stable. Baseline creatinine unknown - Labs reviewed- Renal function/electrolytes with Estimated Creatinine Clearance: 92.5 mL/min (based on SCr of 1.1 mg/dL). according to latest data.   -monitor    Transaminitis  -Trending down  -Likely inflammatory response to viral  infection  -US RUQ showing hepatomegaly with steatosis, possibly MEZA  -monitor    HTN (hypertension)  Continue home amlodipine and losartan for now  Holding Triamterene/HCTZ as pt is slightly volume depleted  Resume diuretics as clinically indicated    Community acquired bacterial pneumonia  Rhinovirus with secondary bacterial pneumonia  CXR: RML and bilateral base consolidations  CT chest: several patchy foci of ground-glass/consolidation bilaterally, suggestive of evolving infectious or inflammatory process    -Continued to require 5L of O2  -Nebulizer treatment  -Antibiotics narrowed to ceftriaxone, started 09/12  -Continue steroids  -ID consulting: fungal crypto, histo, blasto and AFB ordered  -karius testing ordered        Acute respiratory failure with hypoxia and hypercarbia  Patient with Hypercapnic and Hypoxic Respiratory failure which is Acute.  he is not on home oxygen. Supplemental oxygen was provided and noted-      Signs/symptoms of respiratory failure include- increased work of breathing, respiratory distress and wheezing. Contributing diagnoses includes     -Weaned off BIPAP, currently on 5L NC  -Continue Prednisone  -Consulted pulmonary, no bronch at this time  -Cont to wean as tolerated      VTE Risk Mitigation (From admission, onward)         Ordered     IP VTE HIGH RISK PATIENT  Once         09/12/23 1958     Place sequential compression device  Until discontinued         09/12/23 1958     heparin (porcine) injection 7,500 Units  Every 8 hours         09/11/23 1644                Discharge Planning   CB: 9/20/2023     Code Status: Full Code   Is the patient medically ready for discharge?: No    Reason for patient still in hospital (select all that apply): Treatment  Discharge Plan A: Rehab   Discharge Delays: None known at this time              Osmin Zambrano MD  Department of Hospital Medicine   [facility] - Intensive Care ([place])

## 2023-09-18 NOTE — ASSESSMENT & PLAN NOTE
Continue home amlodipine and losartan for now  Holding Triamterene/HCTZ as pt is slightly volume depleted  Resume diuretics as clinically indicated

## 2023-09-18 NOTE — SUBJECTIVE & OBJECTIVE
Interval History: Weaned down to 3L O2 with sp02 96-97%.    Review of Systems   Constitutional:  Positive for fatigue. Negative for chills and fever.   Respiratory:  Positive for cough, shortness of breath and wheezing.    Cardiovascular:  Positive for chest pain. Negative for leg swelling.   Gastrointestinal:  Negative for abdominal pain, diarrhea, nausea and vomiting.   Genitourinary:  Negative for dysuria.   Musculoskeletal:  Negative for back pain.   Skin:  Negative for rash.   Neurological:  Negative for headaches.   Psychiatric/Behavioral:  Negative for confusion.      Objective:     Vital Signs (Most Recent):  Temp: 98 °F (36.7 °C) (09/18/23 1301)  Pulse: 81 (09/18/23 1514)  Resp: (!) 24 (09/18/23 1301)  BP: (!) 146/79 (09/18/23 1301)  SpO2: 95 % (09/18/23 1514) Vital Signs (24h Range):  Temp:  [97.7 °F (36.5 °C)-98.5 °F (36.9 °C)] 98 °F (36.7 °C)  Pulse:  [63-81] 81  Resp:  [15-29] 24  SpO2:  [93 %-98 %] 95 %  BP: (125-158)/(65-83) 146/79     Weight: 128.8 kg (284 lb)  Body mass index is 41.94 kg/m².    Intake/Output Summary (Last 24 hours) at 9/18/2023 1552  Last data filed at 9/18/2023 1542  Gross per 24 hour   Intake 631 ml   Output 1900 ml   Net -1269 ml         Physical Exam  Constitutional:       Appearance: He is ill-appearing.   HENT:      Head: Normocephalic and atraumatic.   Eyes:      Extraocular Movements: Extraocular movements intact.      Pupils: Pupils are equal, round, and reactive to light.   Cardiovascular:      Rate and Rhythm: Regular rhythm.   Pulmonary:      Comments: crackles  Abdominal:      General: There is no distension.      Palpations: Abdomen is soft.      Tenderness: There is no abdominal tenderness.   Musculoskeletal:      Cervical back: Normal range of motion and neck supple.   Neurological:      General: No focal deficit present.      Mental Status: He is oriented to person, place, and time.             Significant Labs: All pertinent labs within the past 24 hours have been  reviewed.  BMP:   Recent Labs   Lab 09/18/23  0542   GLU 89      K 4.9      CO2 27   BUN 31*   CREATININE 1.1   CALCIUM 9.0   MG 1.6     CBC:   Recent Labs   Lab 09/17/23  0417 09/18/23  0542   WBC 11.70 10.23   HGB 12.7* 12.1*   HCT 39.3* 39.2*    444       Significant Imaging: I have reviewed all pertinent imaging results/findings within the past 24 hours.

## 2023-09-18 NOTE — ASSESSMENT & PLAN NOTE
This patient does have evidence of infective focus  My overall impression is sepsis.  Source: Respiratory  Antibiotics given-   Antibiotics (72h ago, onward)    Start     Stop Route Frequency Ordered    09/12/23 2100  cefTRIAXone (ROCEPHIN) 2 g in dextrose 5 % in water (D5W) 100 mL IVPB (MB+)         -- IV Every 24 hours (non-standard times) 09/12/23 1051        Organ dysfunction indicated by Acute liver injury, Acute respiratory failure and transaminitis      Afebrile and satting on the 90s with RR in 30s on arrival  -WBC 23, RR >22, lactic 5.88 improved to 1.85 with fluids, procal 26.88  -Complete 7 days ceftriaxone (09/18)  -weaned to nasal cannula

## 2023-09-18 NOTE — ASSESSMENT & PLAN NOTE
"63M with h/o htn and obesity (BMI 42) admitted 9/12 with acute on chronic respiratory failure; baseline severe MOTLEY and orthopnea. Traveling from [place] to go on a cruise and symptoms started prior to getting to [place] or going on cruise. CT- ground glass consolidation b/l. procal elevated. resp culture- normal luther, rare wbc. 2d echo with PAP 34. Hiv, hep c neg. Received 3 days azithromycin, 3 days Cefepime prior to deescalation to Ceftriaxone since admit. WBC 16k, 84% gran. LFT downtrending. ID consulted for "Rhinovirus, secondary bacteria pneumonia    Patient with known history of chronic respiratory failure. Respiratory Infection Panel with positive rhinovirus. Suspect hyper-inflammatory response to rhinovirus vs secondary process such as superimposed bacterial infection contributing to severity of illness. Though unlikely, mycobacterial infection in differential based upon imaging studies however patient has been unable to produce sputum for AFB smear/culture    Recommendations:   - OK to discontinue Ceftriaxone at this time  - Will follow up pending fungal studies -  histo, blasto pending (crypto negative)  - Can consider AFB smear/culture if patient able to produce sputum  - Karius testing pending, will follow up results  - Agree with continuing trial of steroids  "

## 2023-09-18 NOTE — PLAN OF CARE
Problem: Adult Inpatient Plan of Care  Goal: Optimal Comfort and Wellbeing  Outcome: Ongoing, Progressing  Goal: Readiness for Transition of Care  Outcome: Ongoing, Progressing     Problem: Adjustment to Illness (Sepsis/Septic Shock)  Goal: Optimal Coping  Outcome: Ongoing, Progressing     Problem: Nutrition Impaired (Sepsis/Septic Shock)  Goal: Optimal Nutrition Intake  Outcome: Ongoing, Progressing     Patient remained stable and safe  this shift.  He is still requiring  5L of oxygen per nasal cannula.  Patient sat up in chair a long time today and ate his meals.  Patient reports dyspnea on exertion.

## 2023-09-19 LAB
ALBUMIN SERPL BCP-MCNC: 2.6 G/DL (ref 3.5–5.2)
ALP SERPL-CCNC: 132 U/L (ref 55–135)
ALT SERPL W/O P-5'-P-CCNC: 187 U/L (ref 10–44)
ANION GAP SERPL CALC-SCNC: 6 MMOL/L (ref 8–16)
ANISOCYTOSIS BLD QL SMEAR: SLIGHT
AST SERPL-CCNC: 77 U/L (ref 10–40)
BASOPHILS # BLD AUTO: ABNORMAL K/UL (ref 0–0.2)
BASOPHILS NFR BLD: 0 % (ref 0–1.9)
BILIRUB SERPL-MCNC: 0.4 MG/DL (ref 0.1–1)
BUN SERPL-MCNC: 31 MG/DL (ref 8–23)
CALCIUM SERPL-MCNC: 9.3 MG/DL (ref 8.7–10.5)
CHLORIDE SERPL-SCNC: 108 MMOL/L (ref 95–110)
CO2 SERPL-SCNC: 23 MMOL/L (ref 23–29)
CREAT SERPL-MCNC: 1.1 MG/DL (ref 0.5–1.4)
DIFFERENTIAL METHOD: ABNORMAL
EOSINOPHIL # BLD AUTO: ABNORMAL K/UL (ref 0–0.5)
EOSINOPHIL NFR BLD: 1 % (ref 0–8)
ERYTHROCYTE [DISTWIDTH] IN BLOOD BY AUTOMATED COUNT: 14.7 % (ref 11.5–14.5)
EST. GFR  (NO RACE VARIABLE): >60 ML/MIN/1.73 M^2
GLUCOSE SERPL-MCNC: 108 MG/DL (ref 70–110)
HCT VFR BLD AUTO: 40.8 % (ref 40–54)
HGB BLD-MCNC: 13 G/DL (ref 14–18)
HYPOCHROMIA BLD QL SMEAR: ABNORMAL
IMM GRANULOCYTES # BLD AUTO: ABNORMAL K/UL (ref 0–0.04)
IMM GRANULOCYTES NFR BLD AUTO: ABNORMAL % (ref 0–0.5)
LYMPHOCYTES # BLD AUTO: ABNORMAL K/UL (ref 1–4.8)
LYMPHOCYTES NFR BLD: 22 % (ref 18–48)
MAGNESIUM SERPL-MCNC: 1.6 MG/DL (ref 1.6–2.6)
MCH RBC QN AUTO: 31.4 PG (ref 27–31)
MCHC RBC AUTO-ENTMCNC: 31.9 G/DL (ref 32–36)
MCV RBC AUTO: 99 FL (ref 82–98)
METAMYELOCYTES NFR BLD MANUAL: 1 %
MONOCYTES # BLD AUTO: ABNORMAL K/UL (ref 0.3–1)
MONOCYTES NFR BLD: 8 % (ref 4–15)
NEUTROPHILS NFR BLD: 68 % (ref 38–73)
NRBC BLD-RTO: 0 /100 WBC
OVALOCYTES BLD QL SMEAR: ABNORMAL
PHOSPHATE SERPL-MCNC: 3.2 MG/DL (ref 2.7–4.5)
PLATELET # BLD AUTO: 451 K/UL (ref 150–450)
PMV BLD AUTO: 10.4 FL (ref 9.2–12.9)
POCT GLUCOSE: 109 MG/DL (ref 70–110)
POCT GLUCOSE: 143 MG/DL (ref 70–110)
POCT GLUCOSE: 83 MG/DL (ref 70–110)
POIKILOCYTOSIS BLD QL SMEAR: SLIGHT
POLYCHROMASIA BLD QL SMEAR: ABNORMAL
POTASSIUM SERPL-SCNC: 4.8 MMOL/L (ref 3.5–5.1)
PROT SERPL-MCNC: 7.4 G/DL (ref 6–8.4)
RBC # BLD AUTO: 4.14 M/UL (ref 4.6–6.2)
SODIUM SERPL-SCNC: 137 MMOL/L (ref 136–145)
TOXIC GRANULES BLD QL SMEAR: PRESENT
WBC # BLD AUTO: 10.27 K/UL (ref 3.9–12.7)

## 2023-09-19 PROCEDURE — 25000003 PHARM REV CODE 250: Performed by: INTERNAL MEDICINE

## 2023-09-19 PROCEDURE — 99900035 HC TECH TIME PER 15 MIN (STAT)

## 2023-09-19 PROCEDURE — 84100 ASSAY OF PHOSPHORUS: CPT

## 2023-09-19 PROCEDURE — 25000003 PHARM REV CODE 250

## 2023-09-19 PROCEDURE — 36415 COLL VENOUS BLD VENIPUNCTURE: CPT

## 2023-09-19 PROCEDURE — 20600001 HC STEP DOWN PRIVATE ROOM

## 2023-09-19 PROCEDURE — 94640 AIRWAY INHALATION TREATMENT: CPT

## 2023-09-19 PROCEDURE — 97530 THERAPEUTIC ACTIVITIES: CPT | Mod: CO

## 2023-09-19 PROCEDURE — 94761 N-INVAS EAR/PLS OXIMETRY MLT: CPT

## 2023-09-19 PROCEDURE — 83735 ASSAY OF MAGNESIUM: CPT

## 2023-09-19 PROCEDURE — 99232 PR SUBSEQUENT HOSPITAL CARE,LEVL II: ICD-10-PCS | Mod: ,,, | Performed by: INTERNAL MEDICINE

## 2023-09-19 PROCEDURE — 80053 COMPREHEN METABOLIC PANEL: CPT

## 2023-09-19 PROCEDURE — 27000221 HC OXYGEN, UP TO 24 HOURS

## 2023-09-19 PROCEDURE — 99232 SBSQ HOSP IP/OBS MODERATE 35: CPT | Mod: ,,, | Performed by: INTERNAL MEDICINE

## 2023-09-19 PROCEDURE — 63600175 PHARM REV CODE 636 W HCPCS

## 2023-09-19 PROCEDURE — 85007 BL SMEAR W/DIFF WBC COUNT: CPT

## 2023-09-19 PROCEDURE — 25000242 PHARM REV CODE 250 ALT 637 W/ HCPCS

## 2023-09-19 PROCEDURE — 63600175 PHARM REV CODE 636 W HCPCS: Performed by: INTERNAL MEDICINE

## 2023-09-19 PROCEDURE — 27000207 HC ISOLATION

## 2023-09-19 PROCEDURE — 85027 COMPLETE CBC AUTOMATED: CPT

## 2023-09-19 RX ADMIN — HEPARIN SODIUM 7500 UNITS: 5000 INJECTION INTRAVENOUS; SUBCUTANEOUS at 10:09

## 2023-09-19 RX ADMIN — LEVALBUTEROL 1.25 MG: 1.25 SOLUTION, CONCENTRATE RESPIRATORY (INHALATION) at 07:09

## 2023-09-19 RX ADMIN — IPRATROPIUM BROMIDE 0.5 MG: 0.5 SOLUTION RESPIRATORY (INHALATION) at 12:09

## 2023-09-19 RX ADMIN — LEVALBUTEROL 1.25 MG: 1.25 SOLUTION, CONCENTRATE RESPIRATORY (INHALATION) at 12:09

## 2023-09-19 RX ADMIN — ATORVASTATIN CALCIUM 10 MG: 10 TABLET, FILM COATED ORAL at 09:09

## 2023-09-19 RX ADMIN — LEVALBUTEROL 1.25 MG: 1.25 SOLUTION, CONCENTRATE RESPIRATORY (INHALATION) at 04:09

## 2023-09-19 RX ADMIN — IPRATROPIUM BROMIDE 0.5 MG: 0.5 SOLUTION RESPIRATORY (INHALATION) at 03:09

## 2023-09-19 RX ADMIN — BENZONATATE 200 MG: 100 CAPSULE ORAL at 09:09

## 2023-09-19 RX ADMIN — IPRATROPIUM BROMIDE 0.5 MG: 0.5 SOLUTION RESPIRATORY (INHALATION) at 07:09

## 2023-09-19 RX ADMIN — LOSARTAN POTASSIUM 100 MG: 50 TABLET, FILM COATED ORAL at 08:09

## 2023-09-19 RX ADMIN — LEVALBUTEROL 1.25 MG: 1.25 SOLUTION, CONCENTRATE RESPIRATORY (INHALATION) at 03:09

## 2023-09-19 RX ADMIN — LEVALBUTEROL 1.25 MG: 1.25 SOLUTION, CONCENTRATE RESPIRATORY (INHALATION) at 08:09

## 2023-09-19 RX ADMIN — IPRATROPIUM BROMIDE 0.5 MG: 0.5 SOLUTION RESPIRATORY (INHALATION) at 08:09

## 2023-09-19 RX ADMIN — BENZONATATE 200 MG: 100 CAPSULE ORAL at 03:09

## 2023-09-19 RX ADMIN — BENZONATATE 200 MG: 100 CAPSULE ORAL at 08:09

## 2023-09-19 RX ADMIN — HEPARIN SODIUM 7500 UNITS: 5000 INJECTION INTRAVENOUS; SUBCUTANEOUS at 03:09

## 2023-09-19 RX ADMIN — AMLODIPINE BESYLATE 10 MG: 10 TABLET ORAL at 09:09

## 2023-09-19 RX ADMIN — HEPARIN SODIUM 7500 UNITS: 5000 INJECTION INTRAVENOUS; SUBCUTANEOUS at 06:09

## 2023-09-19 RX ADMIN — Medication 6 MG: at 08:09

## 2023-09-19 RX ADMIN — PREDNISONE 40 MG: 20 TABLET ORAL at 09:09

## 2023-09-19 RX ADMIN — CEFTRIAXONE 2 G: 2 INJECTION, POWDER, FOR SOLUTION INTRAMUSCULAR; INTRAVENOUS at 08:09

## 2023-09-19 RX ADMIN — FLUTICASONE FUROATE AND VILANTEROL TRIFENATATE 1 PUFF: 100; 25 POWDER RESPIRATORY (INHALATION) at 07:09

## 2023-09-19 RX ADMIN — IPRATROPIUM BROMIDE 0.5 MG: 0.5 SOLUTION RESPIRATORY (INHALATION) at 04:09

## 2023-09-19 NOTE — PLAN OF CARE
Problem: Adult Inpatient Plan of Care  Goal: Plan of Care Review  Outcome: Ongoing, Progressing  Goal: Patient-Specific Goal (Individualized)  Outcome: Ongoing, Progressing  Goal: Absence of Hospital-Acquired Illness or Injury  Outcome: Ongoing, Progressing  Goal: Optimal Comfort and Wellbeing  Outcome: Ongoing, Progressing  Goal: Readiness for Transition of Care  Outcome: Ongoing, Progressing     Problem: Bariatric Environmental Safety  Goal: Safety Maintained with Care  Outcome: Ongoing, Progressing     Problem: Adjustment to Illness (Sepsis/Septic Shock)  Goal: Optimal Coping  Outcome: Ongoing, Progressing     Problem: Bleeding (Sepsis/Septic Shock)  Goal: Absence of Bleeding  Outcome: Ongoing, Progressing     Problem: Glycemic Control Impaired (Sepsis/Septic Shock)  Goal: Blood Glucose Level Within Desired Range  Outcome: Ongoing, Progressing     Problem: Infection Progression (Sepsis/Septic Shock)  Goal: Absence of Infection Signs and Symptoms  Outcome: Ongoing, Progressing     Problem: Nutrition Impaired (Sepsis/Septic Shock)  Goal: Optimal Nutrition Intake  Outcome: Ongoing, Progressing     Problem: Fluid and Electrolyte Imbalance (Acute Kidney Injury/Impairment)  Goal: Fluid and Electrolyte Balance  Outcome: Ongoing, Progressing     Problem: Oral Intake Inadequate (Acute Kidney Injury/Impairment)  Goal: Optimal Nutrition Intake  Outcome: Ongoing, Progressing     Problem: Renal Function Impairment (Acute Kidney Injury/Impairment)  Goal: Effective Renal Function  Outcome: Ongoing, Progressing     Problem: Fluid Imbalance (Pneumonia)  Goal: Fluid Balance  Outcome: Ongoing, Progressing     Problem: Infection (Pneumonia)  Goal: Resolution of Infection Signs and Symptoms  Outcome: Ongoing, Progressing     Problem: Respiratory Compromise (Pneumonia)  Goal: Effective Oxygenation and Ventilation  Outcome: Ongoing, Progressing     Problem: Infection  Goal: Absence of Infection Signs and Symptoms  Outcome: Ongoing,  Progressing     EOS: no acute changes. Hernandez, but is now on 3 liters from 5 yesterday. Very pleasant he was concerned on us checking his sugars. I told him maybe cause of steroids. His a1c is not prediabetes. He said he would like to discuss not having his sugars checked so often, I told him I would relay the message to the day team. Vss.

## 2023-09-19 NOTE — ASSESSMENT & PLAN NOTE
"I have reviewed hospital notes from   service and other specialty providers. I have also reviewed CBC, CMP/BMP,  cultures and imaging with my interpretation as documented.     Patient with rhinovirus infection and subsequent pneumonia likely bacterial in nature despite negative sputum cultures. Patient does not have a known history of chronic respiratory failure nor is he on home oxygen. He does not have any historical risk factors for fungal pneumonia and is not classically immunocompromised. His CT imaging did show some "tree-in-bud" nodularities which can be seen with atypical infections however these tend to be slow growing infections. Oxygen weaned down by primary service.    Agree with discontinuing ceftriaxone as patient has completed an appropriate duration for pneumonia.   Mycoplasma ab, Blastomyces urine Ag, Strep pneumo urine Ag, and Fungal immunodiffusion in process.    Will sign off. Please call back if above serologies are positive.    Discussed management plan with the staff and/or members from  service.    "

## 2023-09-19 NOTE — PLAN OF CARE
09/19/23 1716   Post-Acute Status   Post-Acute Authorization Placement;HME   Post-Acute Placement Status Patient declined/refused   HME Status Referrals Sent   Discharge Delays None known at this time   Discharge Plan   Discharge Plan A Home   Discharge Plan B Home     LOBITO discussed rehab recs with pt. Pt is declining inpt rehab, he prefers to dc home with his wife. They plan to rent a car and drive back to [place].    Pt will need home o2. Ref sent to [facility] in [facility] ([phone], f/[phone]). Order will be reviewed and then [facility] office will provide portable tanks with conserving device. Once approved [facility] will deliver to bedside.    LOBITO updated pt.    Poly Matos LCSW  PRN

## 2023-09-19 NOTE — PT/OT/SLP PROGRESS
Occupational Therapy   Treatment    Name: Umair Kenney  MRN: [id number]  Admitting Diagnosis:  Severe sepsis       Recommendations:     Discharge Recommendations: rehabilitation facility  Discharge Equipment Recommendations:  to be determined by next level of care  Barriers to discharge:       Assessment:     Umair Kenney is a 63 y.o. male with a medical diagnosis of Severe sepsis.  He presents with the following performance deficits affecting function: weakness, impaired functional mobility, impaired endurance, impaired self care skills, impaired cardiopulmonary response to activity.     Pt agreeable to therapy and tolerated session well. Pt unpleased with current cardiopulmonary status. Pt requires light assistance with transfers and ambulation. He walked community distance with no AD, c/o shortness of breath once seated. Pt on 2L of O2, SPO2 reading 96% before ambulation and 92% once seated after ambulation.    Rehab Prognosis:  Good; patient would benefit from acute skilled OT services to address these deficits and reach maximum level of function.       Plan:     Patient to be seen 3 x/week to address the above listed problems via self-care/home management, therapeutic activities, therapeutic exercises  Plan of Care Expires:    Plan of Care Reviewed with: patient    Subjective     Chief Complaint: SOB, painful cough  Patient/Family Comments/goals: to improve endurance  Pain/Comfort:  Pain Rating 1:  (unrated)  Location - Side 1: Bilateral  Location - Orientation 1: generalized  Location 1: rib(s) (during coughing)  Pain Addressed 1: Distraction  Pain Rating Post-Intervention 1: other (see comments) (unrated)    Objective:     Communicated with: RN prior to session.  Patient found up in chair with telemetry, peripheral IV, pulse ox (continuous), oxygen upon OT entry to room.  A client care conference was completed by the OTR and the HUERTA prior to treatment by the HUERTA to discuss the patient's POC and current  status.    General Precautions: Standard, droplet, contact, fall    Orthopedic Precautions:N/A  Braces: N/A  Respiratory Status: Nasal cannula, flow 2 L/min     Occupational Performance:     Bed Mobility:    Pt in chair     Functional Mobility/Transfers:  Patient completed Sit <> Stand Transfer with supervision  with  no assistive device   Functional Mobility: pt ambulated community distance with SBA using no AD. Chair follow. No LOB. SOB and heavy breathing noted during walk and once seated in chair.    Activities of Daily Living:  Completed prior to session per pt report      [facility] 6 Click ADL: 21    Treatment & Education:  Pt educated on OT POC and frequency during hospital stay.     Pt educated on deep breathing techniques but stated that he coughs with deep breaths.     Pt educated on pacing techniques to improve endurance and safety awareness.     Addressed all patient questions/concerns within HUERTA scope of practice.     Patient left up in chair with all lines intact, call button in reach, and wife present    GOALS:   Multidisciplinary Problems       Occupational Therapy Goals          Problem: Occupational Therapy    Goal Priority Disciplines Outcome Interventions   Occupational Therapy Goal     OT, PT/OT Ongoing, Progressing    Description: Goals to be met by: 7 days 9/19/23     Patient will increase functional independence with ADLs by performing:    Pt to complete UE dressing with set-up  Pt to complete LE dressing with SBA  Pt to complete toileting with SBA  Pt to complete standing g/h skills with SBA  Pt to complete t/f bed, chair and commode with SBA                        Time Tracking:     OT Date of Treatment: 09/19/23  OT Start Time: 0943  OT Stop Time: 1001  OT Total Time (min): 18 min    Billable Minutes:Therapeutic Activity 18    OT/JENNIFFER: JENNIFFER     Number of JENNIFFER visits since last OT visit: 1    9/19/2023

## 2023-09-19 NOTE — ASSESSMENT & PLAN NOTE
Likely secondary bacterial pneumonia complicating a viral illness in the setting of rhinovirus.   · Has completed course of ceftriaxone as detailed in rhinovirus.   · Oxygen being weaned off and CTA on 9/15 with improved aeration plus some evolving.   · Suspect cough will take some time to resolve. However, if further clinical deterioration then would recommend pulmonology consultation then.

## 2023-09-19 NOTE — NURSING
Nurses Note -- 4 Eyes      9/19/2023   3:42 AM      Skin assessed during: Q Shift Change      [x] No Altered Skin Integrity Present    []Prevention Measures Documented      [] Yes- Altered Skin Integrity Present or Discovered   [] LDA Added if Not in Epic (Describe Wound)   [] New Altered Skin Integrity was Present on Admit and Documented in LDA   [] Wound Image Taken    Wound Care Consulted? No    Attending Nurse:  Opal Maxwell RN/Staff Member:   sid Perales

## 2023-09-19 NOTE — PROGRESS NOTES
Home Oxygen Evaluation    Date Performed: 9/19/2023    1) Patient's Home O2 Sat on room air, while at rest: 87%        If O2 sats on room air at rest are 88% or below, patient qualifies. No additional testing needed. Document N/A in steps 2 and 3. If 89% or above, complete steps 2.

## 2023-09-20 VITALS
WEIGHT: 284 LBS | HEART RATE: 80 BPM | HEIGHT: 69 IN | SYSTOLIC BLOOD PRESSURE: 142 MMHG | BODY MASS INDEX: 42.06 KG/M2 | TEMPERATURE: 98 F | RESPIRATION RATE: 18 BRPM | OXYGEN SATURATION: 94 % | DIASTOLIC BLOOD PRESSURE: 74 MMHG

## 2023-09-20 LAB
ALBUMIN SERPL BCP-MCNC: 2.6 G/DL (ref 3.5–5.2)
ALP SERPL-CCNC: 119 U/L (ref 55–135)
ALT SERPL W/O P-5'-P-CCNC: 201 U/L (ref 10–44)
ANION GAP SERPL CALC-SCNC: 5 MMOL/L (ref 8–16)
AST SERPL-CCNC: 76 U/L (ref 10–40)
B DERMAT AG UR QL IA: NOT DETECTED
BASOPHILS NFR BLD: 1 % (ref 0–1.9)
BILIRUB SERPL-MCNC: 0.4 MG/DL (ref 0.1–1)
BLASTOMYCES AG RESULT: NOT DETECTED NG/ML
BUN SERPL-MCNC: 31 MG/DL (ref 8–23)
CALCIUM SERPL-MCNC: 9.4 MG/DL (ref 8.7–10.5)
CHLORIDE SERPL-SCNC: 106 MMOL/L (ref 95–110)
CO2 SERPL-SCNC: 25 MMOL/L (ref 23–29)
CREAT SERPL-MCNC: 1.3 MG/DL (ref 0.5–1.4)
DIFFERENTIAL METHOD: ABNORMAL
EOSINOPHIL NFR BLD: 0 % (ref 0–8)
ERYTHROCYTE [DISTWIDTH] IN BLOOD BY AUTOMATED COUNT: 14.8 % (ref 11.5–14.5)
EST. GFR  (NO RACE VARIABLE): >60 ML/MIN/1.73 M^2
GLUCOSE SERPL-MCNC: 107 MG/DL (ref 70–110)
H CAPSUL AG UR-MCNC: NOT DETECTED NG/ML
HCT VFR BLD AUTO: 38.3 % (ref 40–54)
HGB BLD-MCNC: 13.1 G/DL (ref 14–18)
HISTOPLASMA ANTIGEN URINE: NOT DETECTED
IMM GRANULOCYTES # BLD AUTO: ABNORMAL K/UL (ref 0–0.04)
IMM GRANULOCYTES NFR BLD AUTO: ABNORMAL % (ref 0–0.5)
LYMPHOCYTES NFR BLD: 14 % (ref 18–48)
MAGNESIUM SERPL-MCNC: 1.6 MG/DL (ref 1.6–2.6)
MCH RBC QN AUTO: 32.3 PG (ref 27–31)
MCHC RBC AUTO-ENTMCNC: 34.2 G/DL (ref 32–36)
MCV RBC AUTO: 94 FL (ref 82–98)
METAMYELOCYTES NFR BLD MANUAL: 1 %
MONOCYTES NFR BLD: 2 % (ref 4–15)
MYELOCYTES NFR BLD MANUAL: 2 %
NEUTROPHILS NFR BLD: 80 % (ref 38–73)
NRBC BLD-RTO: 0 /100 WBC
PHOSPHATE SERPL-MCNC: 3.8 MG/DL (ref 2.7–4.5)
PLATELET # BLD AUTO: 445 K/UL (ref 150–450)
PLATELET BLD QL SMEAR: ABNORMAL
PMV BLD AUTO: 10.3 FL (ref 9.2–12.9)
POCT GLUCOSE: 96 MG/DL (ref 70–110)
POTASSIUM SERPL-SCNC: 4.5 MMOL/L (ref 3.5–5.1)
PROT SERPL-MCNC: 7.1 G/DL (ref 6–8.4)
RBC # BLD AUTO: 4.06 M/UL (ref 4.6–6.2)
SODIUM SERPL-SCNC: 136 MMOL/L (ref 136–145)
WBC # BLD AUTO: 11.43 K/UL (ref 3.9–12.7)

## 2023-09-20 PROCEDURE — 27000221 HC OXYGEN, UP TO 24 HOURS

## 2023-09-20 PROCEDURE — 25000242 PHARM REV CODE 250 ALT 637 W/ HCPCS

## 2023-09-20 PROCEDURE — 85027 COMPLETE CBC AUTOMATED: CPT

## 2023-09-20 PROCEDURE — 83735 ASSAY OF MAGNESIUM: CPT

## 2023-09-20 PROCEDURE — 84100 ASSAY OF PHOSPHORUS: CPT

## 2023-09-20 PROCEDURE — 36415 COLL VENOUS BLD VENIPUNCTURE: CPT

## 2023-09-20 PROCEDURE — 99239 PR HOSPITAL DISCHARGE DAY,>30 MIN: ICD-10-PCS | Mod: ,,, | Performed by: INTERNAL MEDICINE

## 2023-09-20 PROCEDURE — 85007 BL SMEAR W/DIFF WBC COUNT: CPT

## 2023-09-20 PROCEDURE — 63600175 PHARM REV CODE 636 W HCPCS: Performed by: INTERNAL MEDICINE

## 2023-09-20 PROCEDURE — 80053 COMPREHEN METABOLIC PANEL: CPT

## 2023-09-20 PROCEDURE — 99239 HOSP IP/OBS DSCHRG MGMT >30: CPT | Mod: ,,, | Performed by: INTERNAL MEDICINE

## 2023-09-20 PROCEDURE — 63600175 PHARM REV CODE 636 W HCPCS

## 2023-09-20 PROCEDURE — 94640 AIRWAY INHALATION TREATMENT: CPT

## 2023-09-20 PROCEDURE — 25000003 PHARM REV CODE 250: Performed by: INTERNAL MEDICINE

## 2023-09-20 PROCEDURE — 99900035 HC TECH TIME PER 15 MIN (STAT)

## 2023-09-20 PROCEDURE — 94761 N-INVAS EAR/PLS OXIMETRY MLT: CPT

## 2023-09-20 RX ORDER — FLUTICASONE FUROATE AND VILANTEROL 100; 25 UG/1; UG/1
1 POWDER RESPIRATORY (INHALATION) DAILY
Qty: 60 EACH | Refills: 0 | Status: SHIPPED | OUTPATIENT
Start: 2023-09-20 | End: 2023-09-20 | Stop reason: SDUPTHER

## 2023-09-20 RX ORDER — TESTOSTERONE CYPIONATE 200 MG/ML
140 INJECTION, SOLUTION INTRAMUSCULAR ONCE
Status: COMPLETED | OUTPATIENT
Start: 2023-09-20 | End: 2023-09-20

## 2023-09-20 RX ORDER — FLUTICASONE FUROATE AND VILANTEROL 100; 25 UG/1; UG/1
1 POWDER RESPIRATORY (INHALATION) DAILY
Qty: 60 EACH | Refills: 0 | Status: SHIPPED | OUTPATIENT
Start: 2023-09-20 | End: 2023-11-19

## 2023-09-20 RX ORDER — ALBUTEROL SULFATE 90 UG/1
2 AEROSOL, METERED RESPIRATORY (INHALATION) 4 TIMES DAILY
Status: DISCONTINUED | OUTPATIENT
Start: 2023-09-20 | End: 2023-09-20 | Stop reason: HOSPADM

## 2023-09-20 RX ADMIN — IPRATROPIUM BROMIDE 0.5 MG: 0.5 SOLUTION RESPIRATORY (INHALATION) at 07:09

## 2023-09-20 RX ADMIN — BENZONATATE 200 MG: 100 CAPSULE ORAL at 09:09

## 2023-09-20 RX ADMIN — LEVALBUTEROL 1.25 MG: 1.25 SOLUTION, CONCENTRATE RESPIRATORY (INHALATION) at 07:09

## 2023-09-20 RX ADMIN — LEVALBUTEROL 1.25 MG: 1.25 SOLUTION, CONCENTRATE RESPIRATORY (INHALATION) at 04:09

## 2023-09-20 RX ADMIN — TESTOSTERONE CYPIONATE 140 MG: 200 INJECTION, SOLUTION INTRAMUSCULAR at 12:09

## 2023-09-20 RX ADMIN — IPRATROPIUM BROMIDE 0.5 MG: 0.5 SOLUTION RESPIRATORY (INHALATION) at 04:09

## 2023-09-20 RX ADMIN — AMLODIPINE BESYLATE 10 MG: 10 TABLET ORAL at 09:09

## 2023-09-20 RX ADMIN — LEVALBUTEROL 1.25 MG: 1.25 SOLUTION, CONCENTRATE RESPIRATORY (INHALATION) at 11:09

## 2023-09-20 RX ADMIN — IPRATROPIUM BROMIDE 0.5 MG: 0.5 SOLUTION RESPIRATORY (INHALATION) at 11:09

## 2023-09-20 RX ADMIN — IPRATROPIUM BROMIDE 0.5 MG: 0.5 SOLUTION RESPIRATORY (INHALATION) at 12:09

## 2023-09-20 RX ADMIN — FLUTICASONE FUROATE AND VILANTEROL TRIFENATATE 1 PUFF: 100; 25 POWDER RESPIRATORY (INHALATION) at 07:09

## 2023-09-20 RX ADMIN — ATORVASTATIN CALCIUM 10 MG: 10 TABLET, FILM COATED ORAL at 09:09

## 2023-09-20 RX ADMIN — LEVALBUTEROL 1.25 MG: 1.25 SOLUTION, CONCENTRATE RESPIRATORY (INHALATION) at 12:09

## 2023-09-20 RX ADMIN — HEPARIN SODIUM 7500 UNITS: 5000 INJECTION INTRAVENOUS; SUBCUTANEOUS at 06:09

## 2023-09-20 NOTE — SUBJECTIVE & OBJECTIVE
Interval History: Weaned down to 2L 02. He will need home oxygen.    Review of Systems   Constitutional:  Positive for fatigue. Negative for chills and fever.   Respiratory:  Positive for cough, shortness of breath and wheezing.    Cardiovascular:  Positive for chest pain. Negative for leg swelling.   Gastrointestinal:  Negative for abdominal pain, diarrhea, nausea and vomiting.   Genitourinary:  Negative for dysuria.   Musculoskeletal:  Negative for back pain.   Skin:  Negative for rash.   Neurological:  Negative for headaches.   Psychiatric/Behavioral:  Negative for confusion.      Objective:     Vital Signs (Most Recent):  Temp: 98.5 °F (36.9 °C) (09/19/23 1952)  Pulse: 78 (09/19/23 2004)  Resp: (!) 22 (09/19/23 2004)  BP: (!) 173/71 (09/19/23 1952)  SpO2: (!) 93 % (09/19/23 2004) Vital Signs (24h Range):  Temp:  [97.5 °F (36.4 °C)-98.5 °F (36.9 °C)] 98.5 °F (36.9 °C)  Pulse:  [67-85] 78  Resp:  [18-34] 22  SpO2:  [87 %-97 %] 93 %  BP: (143-173)/(63-89) 173/71     Weight: 128.8 kg (284 lb)  Body mass index is 41.94 kg/m².    Intake/Output Summary (Last 24 hours) at 9/19/2023 2134  Last data filed at 9/19/2023 0922  Gross per 24 hour   Intake 500 ml   Output 275 ml   Net 225 ml         Physical Exam        Significant Labs: All pertinent labs within the past 24 hours have been reviewed.  BMP:   Recent Labs   Lab 09/19/23 0442         K 4.8      CO2 23   BUN 31*   CREATININE 1.1   CALCIUM 9.3   MG 1.6     CBC:   Recent Labs   Lab 09/18/23  0542 09/19/23 0442   WBC 10.23 10.27   HGB 12.1* 13.0*   HCT 39.2* 40.8    451*       Significant Imaging: I have reviewed all pertinent imaging results/findings within the past 24 hours.

## 2023-09-20 NOTE — PLAN OF CARE
Spoke with Karol ([facility]) [phone] to follow up on pending home oxygen - request is still being processed    Spoke with Estee ([facility]) [phone] - she will follow up with [place] branch & expedite delivery to bedside once approved     Patient updated at bedside

## 2023-09-20 NOTE — ASSESSMENT & PLAN NOTE
Patient with Hypercapnic and Hypoxic Respiratory failure which is Acute.  he is not on home oxygen. Supplemental oxygen was provided and noted-      Signs/symptoms of respiratory failure include- increased work of breathing, respiratory distress and wheezing. Contributing diagnoses includes     -Weaned off BIPAP, currently on 2-3L NC  -Completed Prednisone  -Consulted pulmonary, no bronch at this time, signed off  -Will need home 02

## 2023-09-20 NOTE — PLAN OF CARE
Eos; no changes patient to go home today, home o2 being arranged for home and for ride to [employer].     Problem: Adult Inpatient Plan of Care  Goal: Plan of Care Review  Outcome: Ongoing, Progressing  Goal: Patient-Specific Goal (Individualized)  Outcome: Ongoing, Progressing  Goal: Absence of Hospital-Acquired Illness or Injury  Outcome: Ongoing, Progressing  Goal: Optimal Comfort and Wellbeing  Outcome: Ongoing, Progressing  Goal: Readiness for Transition of Care  Outcome: Ongoing, Progressing     Problem: Bariatric Environmental Safety  Goal: Safety Maintained with Care  Outcome: Ongoing, Progressing     Problem: Adjustment to Illness (Sepsis/Septic Shock)  Goal: Optimal Coping  Outcome: Ongoing, Progressing     Problem: Bleeding (Sepsis/Septic Shock)  Goal: Absence of Bleeding  Outcome: Ongoing, Progressing     Problem: Glycemic Control Impaired (Sepsis/Septic Shock)  Goal: Blood Glucose Level Within Desired Range  Outcome: Ongoing, Progressing     Problem: Infection Progression (Sepsis/Septic Shock)  Goal: Absence of Infection Signs and Symptoms  Outcome: Ongoing, Progressing     Problem: Nutrition Impaired (Sepsis/Septic Shock)  Goal: Optimal Nutrition Intake  Outcome: Ongoing, Progressing     Problem: Fluid and Electrolyte Imbalance (Acute Kidney Injury/Impairment)  Goal: Fluid and Electrolyte Balance  Outcome: Ongoing, Progressing     Problem: Oral Intake Inadequate (Acute Kidney Injury/Impairment)  Goal: Optimal Nutrition Intake  Outcome: Ongoing, Progressing     Problem: Renal Function Impairment (Acute Kidney Injury/Impairment)  Goal: Effective Renal Function  Outcome: Ongoing, Progressing     Problem: Fluid Imbalance (Pneumonia)  Goal: Fluid Balance  Outcome: Ongoing, Progressing     Problem: Infection (Pneumonia)  Goal: Resolution of Infection Signs and Symptoms  Outcome: Ongoing, Progressing     Problem: Respiratory Compromise (Pneumonia)  Goal: Effective Oxygenation and Ventilation  Outcome: Ongoing,  Progressing     Problem: Infection  Goal: Absence of Infection Signs and Symptoms  Outcome: Ongoing, Progressing

## 2023-09-20 NOTE — PLAN OF CARE
Rodrigo ([facility]) reports home oxygen approved & will be delivered to bedside shortly

## 2023-09-20 NOTE — ASSESSMENT & PLAN NOTE
This patient does have evidence of infective focus  My overall impression is sepsis.  Source: Respiratory  Antibiotics given-   Antibiotics (72h ago, onward)    None        Organ dysfunction indicated by Acute liver injury, Acute respiratory failure and transaminitis      Afebrile and satting on the 90s with RR in 30s on arrival  -WBC 23, RR >22, lactic 5.88 improved to 1.85 with fluids, procal 26.88  -Complete 7 days ceftriaxone (09/19)  -weaned to nasal cannula   No

## 2023-09-20 NOTE — PLAN OF CARE
Unable to fill meds locally due to [place] Medicaid - pharmacy updated to patient's preferred [facility] in [place] - MD to resend Rxs

## 2023-09-20 NOTE — ASSESSMENT & PLAN NOTE
Rhinovirus with secondary bacterial pneumonia  CXR: RML and bilateral base consolidations  CT chest: several patchy foci of ground-glass/consolidation bilaterally, suggestive of evolving infectious or inflammatory process    -Continued to require 2-3L of O2  -Nebulizer treatment  -Antibiotics narrowed to ceftriaxone, started 09/12  -Completed steroids  -ID consulting: fungal crypto, histo, blasto and AFB ordered  -karius testing ordered

## 2023-09-20 NOTE — ASSESSMENT & PLAN NOTE
- completed treatment  - pt will be driving back home with his wife to [place] tomorrow morning with home 02.   - his pcp in [place] will arrange pulmonary referral

## 2023-09-20 NOTE — PLAN OF CARE
"Followed up with Scott ([place]) - unable to fill meds as prescriber isn't in [place] Medicaid system - met with patient at bedside who provided contact info for his PCP "Dr Velasco [phone]" - LVM   "

## 2023-09-20 NOTE — PROGRESS NOTES
[facility] - Intensive Care ([place] [id number])  [facility] Medicine  Progress Note    Patient Name: Umair Kenney  MRN: [id number]  Patient Class: IP- Inpatient   Admission Date: 9/10/2023  Length of Stay: 9 days  Attending Physician: Osmin Zambrano MD  Primary Care Provider: Stacia, Primary Doctor        Subjective:     Principal Problem:Severe sepsis        HPI:  63-year-old male with pmh of HTN and hx of PNA at age 17 and 55 that presents after returning from a [employer] cruise to [place] for 1 week. Prior to leaving [place] on the cruise, he starting to feel sick. He began having fever, chills, and ?bloody productive cough after leaving. The cruise doctor treated him for URI and bronchitis with zpac/cough suppressant. Throughout the trip, his symptoms progressively worsened and began having diarrhea (10 times daily) and shortness of breath. He was seen again by the doctor this AM and found to have PNA on CXR. Complaints of orthopnea, PND, and apnea at night. Denies any sick contacts or anyone else on cruise getting sick.     Patient's BP was in the 50s/40s on arrival per EMS. He was given a 1L of fluid and satting in the low 90s on 5L NC. Due to his acidemia and RR in the 30s on arrival, he was put on BIPAP and weaned to comfort flow satting 95-96%.     ED: afebrile, -110s, WBC 23, Na 128, K 3.2, bicarb 16, anion gap 20, Cr 3.5, AST//89  Procal 26, VBG - ph 7.2, CO2 49, HCO3 20.5, lactic 5.78, point of care lactic 1.85  CXR - R lung patchy opacities > L lung    Started on IV vanc/cefepime/azithromycin and admitted to medicine.            Overview/Hospital Course:  Patient admitted to MICU for increased work of breathing secondary to pneumonia. His legionella studies are pending. Initially treated with azithromycin, cefepime, and vancomycin and narrowed to ceftriaxone and azithromycin on 9/12/23. O2 requirements improving. Patient is not tolerating BiPAP overnight. Since admission, he has had worsening  LFTs with workup including liver ultrasound and hepatitis panel.      Interval History: Weaned down to 2L 02. He will need home oxygen.    Review of Systems   Constitutional:  Positive for fatigue. Negative for chills and fever.   Respiratory:  Positive for cough, shortness of breath and wheezing.    Cardiovascular:  Positive for chest pain. Negative for leg swelling.   Gastrointestinal:  Negative for abdominal pain, diarrhea, nausea and vomiting.   Genitourinary:  Negative for dysuria.   Musculoskeletal:  Negative for back pain.   Skin:  Negative for rash.   Neurological:  Negative for headaches.   Psychiatric/Behavioral:  Negative for confusion.      Objective:     Vital Signs (Most Recent):  Temp: 98.5 °F (36.9 °C) (09/19/23 1952)  Pulse: 78 (09/19/23 2004)  Resp: (!) 22 (09/19/23 2004)  BP: (!) 173/71 (09/19/23 1952)  SpO2: (!) 93 % (09/19/23 2004) Vital Signs (24h Range):  Temp:  [97.5 °F (36.4 °C)-98.5 °F (36.9 °C)] 98.5 °F (36.9 °C)  Pulse:  [67-85] 78  Resp:  [18-34] 22  SpO2:  [87 %-97 %] 93 %  BP: (143-173)/(63-89) 173/71     Weight: 128.8 kg (284 lb)  Body mass index is 41.94 kg/m².    Intake/Output Summary (Last 24 hours) at 9/19/2023 2134  Last data filed at 9/19/2023 0922  Gross per 24 hour   Intake 500 ml   Output 275 ml   Net 225 ml         Physical Exam        Significant Labs: All pertinent labs within the past 24 hours have been reviewed.  BMP:   Recent Labs   Lab 09/19/23 0442         K 4.8      CO2 23   BUN 31*   CREATININE 1.1   CALCIUM 9.3   MG 1.6     CBC:   Recent Labs   Lab 09/18/23  0542 09/19/23 0442   WBC 10.23 10.27   HGB 12.1* 13.0*   HCT 39.2* 40.8    451*       Significant Imaging: I have reviewed all pertinent imaging results/findings within the past 24 hours.      Assessment/Plan:      * Severe sepsis  This patient does have evidence of infective focus  My overall impression is sepsis.  Source: Respiratory  Antibiotics given-   Antibiotics (72h ago,  onward)    None        Organ dysfunction indicated by Acute liver injury, Acute respiratory failure and transaminitis      Afebrile and satting on the 90s with RR in 30s on arrival  -WBC 23, RR >22, lactic 5.88 improved to 1.85 with fluids, procal 26.88  -Complete 7 days ceftriaxone (09/19)  -weaned to nasal cannula    Pneumonia due to infectious organism  - completed treatment  - pt will be driving back home with his wife to [place] tomorrow morning with home 02.   - his pcp in [place] will arrange pulmonary referral      COPD exacerbation  -Continue breo, nebulizers       Rhinovirus        Severe obesity (BMI >= 40)  Body mass index is 41.94 kg/m². Morbid obesity complicates all aspects of disease management from diagnostic modalities to treatment. Weight loss encouraged and health benefits explained to patient.         DANNI (acute kidney injury)  Patient with acute kidney injury/acute renal failure likely due to pre-renal azotemia due to dehydration DANNI is currently stable. Baseline creatinine unknown - Labs reviewed- Renal function/electrolytes with Estimated Creatinine Clearance: 92.5 mL/min (based on SCr of 1.1 mg/dL). according to latest data.   -monitor    Transaminitis  -Trending down  -Likely inflammatory response to viral infection  -US RUQ showing hepatomegaly with steatosis, possibly MEZA  -monitor    HTN (hypertension)  Continue home amlodipine and losartan   Resume Triamterene/HCTZ    Community acquired bacterial pneumonia  Rhinovirus with secondary bacterial pneumonia  CXR: RML and bilateral base consolidations  CT chest: several patchy foci of ground-glass/consolidation bilaterally, suggestive of evolving infectious or inflammatory process    -Continued to require 2-3L of O2  -Nebulizer treatment  -Antibiotics narrowed to ceftriaxone, started 09/12  -Completed steroids  -ID consulting: fungal crypto, histo, blasto and AFB ordered  -karius testing ordered        Acute respiratory failure with hypoxia and  hypercarbia  Patient with Hypercapnic and Hypoxic Respiratory failure which is Acute.  he is not on home oxygen. Supplemental oxygen was provided and noted-      Signs/symptoms of respiratory failure include- increased work of breathing, respiratory distress and wheezing. Contributing diagnoses includes     -Weaned off BIPAP, currently on 2-3L NC  -Completed Prednisone  -Consulted pulmonary, no bronch at this time, signed off  -Will need home 02    VTE Risk Mitigation (From admission, onward)         Ordered     IP VTE HIGH RISK PATIENT  Once         09/12/23 1958     Place sequential compression device  Until discontinued         09/12/23 1958     heparin (porcine) injection 7,500 Units  Every 8 hours         09/11/23 1644                Discharge Planning   CB: 9/20/2023     Code Status: Full Code   Is the patient medically ready for discharge?: Yes    Reason for patient still in hospital (select all that apply): Pending disposition  Discharge Plan A: Home   Discharge Delays: None known at this time              Osmin Zambrano MD  Department of Hospital Medicine   [facility] - Intensive Care ([place])

## 2023-09-20 NOTE — PLAN OF CARE
Problem: Adult Inpatient Plan of Care  Goal: Optimal Comfort and Wellbeing  Outcome: Ongoing, Progressing    Patient remains on 2 liter of oxygen high flow per nasal cannula.  He is supposed to discharge home on tomorrow 9/20/2023 with home oxygen because he failed the 6 minutes walk test and qualifies for home O2.  Patient has remained stable and safe this shift.

## 2023-09-20 NOTE — PLAN OF CARE
Portable oxygen delivered to bedside for transportation home - remainder of delivery will be completed once patient home - contact info provided for Michell - will follow up with PCP for all other needs - wife at bedside to provide transportation home     [facility] - Intensive Care ([facility]14)  Discharge Final Note    Primary Care Provider: Stacia Primary Doctor    Expected Discharge Date: 9/20/2023    Final Discharge Note (most recent)       Final Note - 09/20/23 1256          Final Note    Assessment Type Final Discharge Note     Anticipated Discharge Disposition Home or Self Care     What phone number can be called within the next 1-3 days to see how you are doing after discharge? [phone]     Hospital Resources/Appts/Education Provided Provided patient/caregiver with written discharge plan information;Post-Acute resouces added to AVS        Post-Acute Status    Post-Acute Authorization HME     E Status Set-up Complete/Auth obtained     Discharge Delays None known at this time                   Contact Info       No, Primary Doctor   Relationship: PCP - General        Next Steps: Call in 2 day(s)    Instructions: PCP in [place] to make a referral to pulmonology

## 2023-09-20 NOTE — NURSING
Pt given albuterol and Breo inhalers . Instructed on how to use both able to repeat and demonstrate how to use effectively. O2 takes at bedside for travel home. Pt had no more questions and discharge papers were given and dicussed with pt.

## 2023-09-21 ENCOUNTER — PATIENT OUTREACH (OUTPATIENT)
Dept: ADMINISTRATIVE | Facility: CLINIC | Age: 63
End: 2023-09-21
Payer: MEDICAID

## 2023-09-21 LAB
LEGIONELLA CULTURE STATUS: NORMAL
LEGIONELLA SPEC CULT: NORMAL
SPECIMEN SOURCE: NORMAL

## 2023-09-21 NOTE — PROGRESS NOTES
C3 nurse attempted to contact Umair Kenney for a TCC post hospital discharge follow up call. No answer. The patient does not have a scheduled HOSFU appointment, and the pt does not have an [facility] PCP.

## 2023-09-22 LAB — S PNEUM AG UR QL: DETECTED

## 2023-09-23 NOTE — DISCHARGE SUMMARY
[facility] - Intensive Care ([place] [address])  [facility] Medicine  Discharge Summary      Patient Name: Umair Kenney  MRN: [id number]  SHANNON: [id number]  Patient Class: IP- Inpatient  Admission Date: 9/10/2023  Hospital Length of Stay: 10 days  Discharge Date and Time: 9/20/2023  2:35 PM  Attending Physician: Stacia att. providers found   Discharging Provider: Osmin Zambrano MD  Primary Care Provider: Stacia Primary Doctor  [facility] Medicine Team: [facility] Osmin Zambrano MD  Primary Care Team: [facility]    HPI:   63-year-old male with pmh of HTN and hx of PNA at age 17 and 55 that presents after returning from a [employer] cruise to [place] for 1 week. Prior to leaving [place] on the cruise, he starting to feel sick. He began having fever, chills, and ?bloody productive cough after leaving. The cruise doctor treated him for URI and bronchitis with zpac/cough suppressant. Throughout the trip, his symptoms progressively worsened and began having diarrhea (10 times daily) and shortness of breath. He was seen again by the doctor this AM and found to have PNA on CXR. Complaints of orthopnea, PND, and apnea at night. Denies any sick contacts or anyone else on cruise getting sick.     Patient's BP was in the 50s/40s on arrival per EMS. He was given a 1L of fluid and satting in the low 90s on 5L NC. Due to his acidemia and RR in the 30s on arrival, he was put on BIPAP and weaned to comfort flow satting 95-96%.     ED: afebrile, -110s, WBC 23, Na 128, K 3.2, bicarb 16, anion gap 20, Cr 3.5, AST//89  Procal 26, VBG - ph 7.2, CO2 49, HCO3 20.5, lactic 5.78, point of care lactic 1.85  CXR - R lung patchy opacities > L lung    Started on IV vanc/cefepime/azithromycin and admitted to medicine.            * No surgery found *      Hospital Course:   Patient admitted to MICU for increased work of breathing secondary to pneumonia. His legionella studies negative. Initially treated with azithromycin, cefepime, and  vancomycin and narrowed to ceftriaxone and azithromycin on 9/12/23. O2 requirements improving. Patient did not tolerate BiPAP overnight. Since admission, he has had worsening LFTs with workup including liver ultrasound and hepatitis panel. He was transferred to hospital medicine 09/14. Patient's Rhinovirus came back positive in his respiratory panel. He continued to have a slow clinical response on 5L O2. 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 He completed 7 days IV antibiotics and 5 days steroids. He was weaned to 2-3L O2. Patient was seen by pulmonary and ID. No bronchoscopy was recommended at this time. He was discharged on 3L home O2 and would be driving back to [place] with his wife, where he would be seen by his PCP for medication refill and referral to pulmonary specialist.       Goals of Care Treatment Preferences:  Code Status: Full Code      Consults:   Consults (From admission, onward)        Status Ordering Provider     Inpatient consult to Pulmonology  Once        Provider:  (Not yet assigned)    Completed SARKIS SILVEIRA     Inpatient consult to Midline team  Once        Provider:  (Not yet assigned)    Completed SARKIS SILVEIRA     Inpatient consult to Infectious Diseases  Once        Provider:  (Not yet assigned)    Completed SARKIS SILVEIRA     Pharmacy to dose Vancomycin consult  Once        Provider:  (Not yet assigned)   [facility] Report.    Completed LUZMARIA LÓPEZ     Inpatient consult to Critical Care Medicine  Once        Provider:  (Not yet assigned)    Completed AARTI BERG new Assessment & Plan notes have been filed under this hospital service since the last note was generated.  Service: Hospital Medicine    Final Active Diagnoses:    Diagnosis Date Noted POA    PRINCIPAL PROBLEM:  Severe sepsis [A41.9, R65.20] 09/10/2023 Yes    COPD exacerbation  "[J44.1] 09/16/2023 Yes    Pneumonia due to infectious organism [J18.9] 09/16/2023 Unknown    Rhinovirus [B34.8] 09/15/2023 Yes    Acute respiratory failure with hypoxia and hypercarbia [J96.01, J96.02] 09/10/2023 Yes    Community acquired bacterial pneumonia [J15.9] 09/10/2023 Yes    HTN (hypertension) [I10] 09/10/2023 Yes     Chronic    Transaminitis [R74.01] 09/10/2023 Yes    DANNI (acute kidney injury) [N17.9] 09/10/2023 Yes    Severe obesity (BMI >= 40) [E66.01] 09/10/2023 Yes      Problems Resolved During this Admission:    Diagnosis Date Noted Date Resolved POA    Hyponatremia [E87.1] 09/10/2023 09/15/2023 Yes    Hypokalemia [E87.6] 09/10/2023 09/15/2023 Yes    High anion gap metabolic acidosis [E87.29] 09/10/2023 09/15/2023 Yes       Discharged Condition: stable    Disposition: Home or Self Care    Follow Up:   Follow-up Information     No, Primary Doctor. Call in 2 day(s).    Why: PCP in [place] to make a referral to pulmonology                     Patient Instructions:      OXYGEN FOR HOME USE     Order Specific Question Answer Comments   Liter Flow 3    Duration Continuous    Qualifying Test Performed at: Rest    Oxygen saturation: 87    Portable mode: pulse dose acceptable    Mode: Conserving device    Route nasal cannula    Device: home concentrator with portable tanks    Length of need (in months): 99 mos    Patient condition with qualifying saturation COPD    Height: 5' 9" (1.753 m)    Weight: 128.8 kg (284 lb)    Alternative treatment measures have been tried or considered and deemed clinically ineffective. Yes    Vendor: Other (use comments) LIVES OUT OF [facility]   CRM Resolution Date: 9/19/2023      Diet Cardiac       Significant Diagnostic Studies: Radiology: X-Ray: CXR: X-Ray Chest 1 View (CXR):   Results for orders placed or performed during the hospital encounter of 09/10/23   X-Ray Chest 1 View    Narrative    EXAMINATION:  XR CHEST 1 VIEW    CLINICAL " HISTORY:  Hypoxia;    TECHNIQUE:  Single frontal view of the chest was performed.    COMPARISON:  09/10/2023, CT 09/10/2023    FINDINGS:  The cardiomediastinal silhouette is prominent, magnified by technique, stable..  There is no pleural effusion.  The trachea is midline.  The lungs are symmetrically expanded bilaterally with patchy increased interstitial and parenchymal attenuation bilaterally, mostly involving the right hemithorax, similar to the previous exam..  No new large focal consolidation.  There is no pneumothorax.  The osseous structures are unchanged..      Impression    As above      Electronically signed by: Aleksandr Pagan MD  Date:    09/12/2023  Time:    18:30     CT scan: CT ABDOMEN PELVIS WITH CONTRAST: No results found for this visit on 09/10/23.    Pending Diagnostic Studies:     Procedure Component Value Units Date/Time    Fungal Immunodiffusion - Blood [[id number]] Collected: 09/16/23 1948    Order Status: Sent Lab Status: In process Updated: 09/16/23 1957    Specimen: Blood     Legionella antigen, urine [[id number]] Collected: 09/10/23 1354    Order Status: Sent Lab Status: In process Updated: 09/10/23 1401    Specimen: Urine, Catheterized     Mycoplasma pneumoniae antibody, IgM [[id number]] Collected: 09/16/23 1948    Order Status: Sent Lab Status: In process Updated: 09/16/23 1957    Specimen: Blood          Medications:  Reconciled Home Medications:      Medication List      START taking these medications    fluticasone furoate-vilanteroL 100-25 mcg/dose diskus inhaler  Commonly known as: BREO  Inhale 1 puff into the lungs once daily. Controller     ipratropium-albuteroL  mcg/actuation inhaler  Commonly known as: CombiVENT  Inhale 1 puff into the lungs every 4 (four) hours as needed for Wheezing or Shortness of Breath. Rescue        CONTINUE taking these medications    acetaminophen 500 MG tablet  Commonly known as: TYLENOL  Take 1,000 mg by mouth 2 (two) times daily as needed for  Pain.     amLODIPine 10 MG tablet  Commonly known as: NORVASC  Take 10 mg by mouth once daily.     atorvastatin 10 MG tablet  Commonly known as: LIPITOR  Take 10 mg by mouth once daily.     fluticasone propionate 50 mcg/actuation nasal spray  Commonly known as: FLONASE  1 spray by Each Nostril route daily as needed for Allergies.     losartan 100 MG tablet  Commonly known as: COZAAR  Take 100 mg by mouth every evening.     testosterone cypionate 200 mg/mL injection  Commonly known as: DEPOTESTOTERONE CYPIONATE  Inject 0.7 mLs into the muscle once a week.     triamterene-hydrochlorothiazide 75-50 mg 75-50 mg per tablet  Commonly known as: MAXZIDE  Take 1 tablet by mouth every morning.            Indwelling Lines/Drains at time of discharge:   Lines/Drains/Airways     None                 Time spent on the discharge of patient: >30 minutes         Osmin Zambrano MD  Department of Hospital Medicine  [facility] - Intensive Care ([place]-14)

## 2023-09-24 LAB
ASPERGILLUS AB SER QL ID: NOT DETECTED
B DERMAT AB SER QL ID: NOT DETECTED
C IMMITIS AB SER QL ID: NOT DETECTED
H CAPSUL AB SER QL ID: NOT DETECTED

## 2023-09-25 ENCOUNTER — TELEPHONE (OUTPATIENT)
Dept: INFECTIOUS DISEASES | Facility: HOSPITAL | Age: 63
End: 2023-09-25
Payer: MEDICAID

## 2023-09-25 NOTE — TELEPHONE ENCOUNTER
"Strep pneumo urine antigen just resulted positive. Attempted to call patient, no phone number in chart. Called emergency contact, but number just goes "beep beep beep". Suspect strep pneumo has been adequately treated by abx he received during admit  "

## 2023-09-27 LAB — M PNEUMO IGM SER IA-ACNC: 14 U/ML

## 2023-12-11 PROBLEM — A41.9 SEVERE SEPSIS: Status: RESOLVED | Noted: 2023-09-10 | Resolved: 2023-12-11

## 2023-12-11 PROBLEM — N17.9 AKI (ACUTE KIDNEY INJURY): Status: RESOLVED | Noted: 2023-09-10 | Resolved: 2023-12-11

## 2023-12-11 PROBLEM — R65.20 SEVERE SEPSIS: Status: RESOLVED | Noted: 2023-09-10 | Resolved: 2023-12-11

## 2023-12-11 PROBLEM — J96.01 ACUTE RESPIRATORY FAILURE WITH HYPOXIA AND HYPERCARBIA: Status: RESOLVED | Noted: 2023-09-10 | Resolved: 2023-12-11

## 2023-12-11 PROBLEM — J96.02 ACUTE RESPIRATORY FAILURE WITH HYPOXIA AND HYPERCARBIA: Status: RESOLVED | Noted: 2023-09-10 | Resolved: 2023-12-11

## 2023-12-18 PROBLEM — J18.9 PNEUMONIA DUE TO INFECTIOUS ORGANISM: Status: RESOLVED | Noted: 2023-09-16 | Resolved: 2023-12-18

## 2023-12-18 PROBLEM — J15.9 COMMUNITY ACQUIRED BACTERIAL PNEUMONIA: Status: RESOLVED | Noted: 2023-09-10 | Resolved: 2023-12-18
